# Patient Record
Sex: MALE | Race: WHITE | Employment: OTHER | URBAN - METROPOLITAN AREA
[De-identification: names, ages, dates, MRNs, and addresses within clinical notes are randomized per-mention and may not be internally consistent; named-entity substitution may affect disease eponyms.]

---

## 2017-01-01 ENCOUNTER — APPOINTMENT (OUTPATIENT)
Dept: GENERAL RADIOLOGY | Age: 82
DRG: 871 | End: 2017-01-01
Attending: HOSPITALIST
Payer: MEDICARE

## 2017-01-01 ENCOUNTER — APPOINTMENT (OUTPATIENT)
Dept: CT IMAGING | Age: 82
DRG: 871 | End: 2017-01-01
Attending: STUDENT IN AN ORGANIZED HEALTH CARE EDUCATION/TRAINING PROGRAM
Payer: MEDICARE

## 2017-01-01 ENCOUNTER — HOSPITAL ENCOUNTER (INPATIENT)
Age: 82
LOS: 5 days | Discharge: NURSING, REHAB OR LONG-TERM CARE | DRG: 871 | End: 2017-12-04
Attending: STUDENT IN AN ORGANIZED HEALTH CARE EDUCATION/TRAINING PROGRAM | Admitting: FAMILY MEDICINE
Payer: MEDICARE

## 2017-01-01 ENCOUNTER — APPOINTMENT (OUTPATIENT)
Dept: GENERAL RADIOLOGY | Age: 82
DRG: 871 | End: 2017-01-01
Attending: FAMILY MEDICINE
Payer: MEDICARE

## 2017-01-01 ENCOUNTER — APPOINTMENT (OUTPATIENT)
Dept: GENERAL RADIOLOGY | Age: 82
DRG: 871 | End: 2017-01-01
Attending: STUDENT IN AN ORGANIZED HEALTH CARE EDUCATION/TRAINING PROGRAM
Payer: MEDICARE

## 2017-01-01 ENCOUNTER — APPOINTMENT (OUTPATIENT)
Dept: ULTRASOUND IMAGING | Age: 82
DRG: 871 | End: 2017-01-01
Attending: FAMILY MEDICINE
Payer: MEDICARE

## 2017-01-01 ENCOUNTER — APPOINTMENT (OUTPATIENT)
Dept: MRI IMAGING | Age: 82
DRG: 871 | End: 2017-01-01
Attending: FAMILY MEDICINE
Payer: MEDICARE

## 2017-01-01 VITALS
SYSTOLIC BLOOD PRESSURE: 163 MMHG | OXYGEN SATURATION: 93 % | HEIGHT: 73 IN | HEART RATE: 80 BPM | TEMPERATURE: 97.8 F | WEIGHT: 182.6 LBS | BODY MASS INDEX: 24.2 KG/M2 | RESPIRATION RATE: 16 BRPM | DIASTOLIC BLOOD PRESSURE: 82 MMHG

## 2017-01-01 DIAGNOSIS — R29.6 FALLS FREQUENTLY: Primary | ICD-10-CM

## 2017-01-01 DIAGNOSIS — G91.2 NPH (NORMAL PRESSURE HYDROCEPHALUS) (HCC): ICD-10-CM

## 2017-01-01 DIAGNOSIS — J18.9 COMMUNITY ACQUIRED PNEUMONIA OF LEFT LOWER LOBE OF LUNG: ICD-10-CM

## 2017-01-01 LAB
ALBUMIN SERPL-MCNC: 3 G/DL (ref 3.5–5)
ALBUMIN/GLOB SERPL: 0.6 {RATIO} (ref 1.1–2.2)
ALP SERPL-CCNC: 98 U/L (ref 45–117)
ALT SERPL-CCNC: 17 U/L (ref 12–78)
ANION GAP SERPL CALC-SCNC: 10 MMOL/L (ref 5–15)
ANION GAP SERPL CALC-SCNC: 7 MMOL/L (ref 5–15)
ANION GAP SERPL CALC-SCNC: 7 MMOL/L (ref 5–15)
ANION GAP SERPL CALC-SCNC: 8 MMOL/L (ref 5–15)
ANION GAP SERPL CALC-SCNC: 9 MMOL/L (ref 5–15)
APPEARANCE UR: ABNORMAL
AST SERPL-CCNC: 22 U/L (ref 15–37)
ATRIAL RATE: 102 BPM
BACTERIA SPEC CULT: ABNORMAL
BACTERIA SPEC CULT: NORMAL
BACTERIA SPEC CULT: NORMAL
BACTERIA URNS QL MICRO: ABNORMAL /HPF
BASOPHILS # BLD: 0 K/UL (ref 0–0.1)
BASOPHILS NFR BLD: 0 % (ref 0–1)
BASOPHILS NFR BLD: 1 % (ref 0–1)
BASOPHILS NFR BLD: 1 % (ref 0–1)
BILIRUB SERPL-MCNC: 1.3 MG/DL (ref 0.2–1)
BILIRUB UR QL: NEGATIVE
BNP SERPL-MCNC: 4815 PG/ML (ref 0–450)
BUN SERPL-MCNC: 31 MG/DL (ref 6–20)
BUN SERPL-MCNC: 34 MG/DL (ref 6–20)
BUN SERPL-MCNC: 35 MG/DL (ref 6–20)
BUN SERPL-MCNC: 36 MG/DL (ref 6–20)
BUN SERPL-MCNC: 36 MG/DL (ref 6–20)
BUN/CREAT SERPL: 18 (ref 12–20)
BUN/CREAT SERPL: 20 (ref 12–20)
BUN/CREAT SERPL: 21 (ref 12–20)
BUN/CREAT SERPL: 23 (ref 12–20)
BUN/CREAT SERPL: 25 (ref 12–20)
CALCIUM SERPL-MCNC: 10.1 MG/DL (ref 8.5–10.1)
CALCIUM SERPL-MCNC: 8.9 MG/DL (ref 8.5–10.1)
CALCIUM SERPL-MCNC: 9 MG/DL (ref 8.5–10.1)
CALCIUM SERPL-MCNC: 9.3 MG/DL (ref 8.5–10.1)
CALCIUM SERPL-MCNC: 9.4 MG/DL (ref 8.5–10.1)
CALCULATED R AXIS, ECG10: -76 DEGREES
CALCULATED T AXIS, ECG11: 33 DEGREES
CC UR VC: ABNORMAL
CHLORIDE SERPL-SCNC: 106 MMOL/L (ref 97–108)
CHLORIDE SERPL-SCNC: 107 MMOL/L (ref 97–108)
CHLORIDE SERPL-SCNC: 108 MMOL/L (ref 97–108)
CHLORIDE SERPL-SCNC: 109 MMOL/L (ref 97–108)
CHLORIDE SERPL-SCNC: 110 MMOL/L (ref 97–108)
CK SERPL-CCNC: 333 U/L (ref 39–308)
CK SERPL-CCNC: 334 U/L (ref 39–308)
CO2 SERPL-SCNC: 21 MMOL/L (ref 21–32)
CO2 SERPL-SCNC: 23 MMOL/L (ref 21–32)
CO2 SERPL-SCNC: 24 MMOL/L (ref 21–32)
COLOR UR: ABNORMAL
CREAT SERPL-MCNC: 1.35 MG/DL (ref 0.7–1.3)
CREAT SERPL-MCNC: 1.36 MG/DL (ref 0.7–1.3)
CREAT SERPL-MCNC: 1.74 MG/DL (ref 0.7–1.3)
CREAT SERPL-MCNC: 1.8 MG/DL (ref 0.7–1.3)
CREAT SERPL-MCNC: 1.96 MG/DL (ref 0.7–1.3)
DIAGNOSIS, 93000: NORMAL
DIFFERENTIAL METHOD BLD: ABNORMAL
DIFFERENTIAL METHOD BLD: ABNORMAL
DIGOXIN SERPL-MCNC: 0.9 NG/ML (ref 0.9–2)
EOSINOPHIL # BLD: 0 K/UL (ref 0–0.4)
EOSINOPHIL # BLD: 0 K/UL (ref 0–0.4)
EOSINOPHIL # BLD: 0.1 K/UL (ref 0–0.4)
EOSINOPHIL # BLD: 0.3 K/UL (ref 0–0.4)
EOSINOPHIL # BLD: 0.4 K/UL (ref 0–0.4)
EOSINOPHIL NFR BLD: 0 % (ref 0–7)
EOSINOPHIL NFR BLD: 0 % (ref 0–7)
EOSINOPHIL NFR BLD: 1 % (ref 0–7)
EOSINOPHIL NFR BLD: 5 % (ref 0–7)
EOSINOPHIL NFR BLD: 8 % (ref 0–7)
EPITH CASTS URNS QL MICRO: ABNORMAL /LPF
ERYTHROCYTE [DISTWIDTH] IN BLOOD BY AUTOMATED COUNT: 14.6 % (ref 11.5–14.5)
ERYTHROCYTE [DISTWIDTH] IN BLOOD BY AUTOMATED COUNT: 14.8 % (ref 11.5–14.5)
ERYTHROCYTE [DISTWIDTH] IN BLOOD BY AUTOMATED COUNT: 15.3 % (ref 11.5–14.5)
ERYTHROCYTE [DISTWIDTH] IN BLOOD BY AUTOMATED COUNT: 15.3 % (ref 11.5–14.5)
ERYTHROCYTE [DISTWIDTH] IN BLOOD BY AUTOMATED COUNT: 15.5 % (ref 11.5–14.5)
EST. AVERAGE GLUCOSE BLD GHB EST-MCNC: 123 MG/DL
GLOBULIN SER CALC-MCNC: 5.2 G/DL (ref 2–4)
GLUCOSE BLD STRIP.AUTO-MCNC: 107 MG/DL (ref 65–100)
GLUCOSE BLD STRIP.AUTO-MCNC: 109 MG/DL (ref 65–100)
GLUCOSE BLD STRIP.AUTO-MCNC: 117 MG/DL (ref 65–100)
GLUCOSE BLD STRIP.AUTO-MCNC: 120 MG/DL (ref 65–100)
GLUCOSE BLD STRIP.AUTO-MCNC: 144 MG/DL (ref 65–100)
GLUCOSE BLD STRIP.AUTO-MCNC: 153 MG/DL (ref 65–100)
GLUCOSE BLD STRIP.AUTO-MCNC: 164 MG/DL (ref 65–100)
GLUCOSE BLD STRIP.AUTO-MCNC: 165 MG/DL (ref 65–100)
GLUCOSE BLD STRIP.AUTO-MCNC: 176 MG/DL (ref 65–100)
GLUCOSE BLD STRIP.AUTO-MCNC: 179 MG/DL (ref 65–100)
GLUCOSE BLD STRIP.AUTO-MCNC: 198 MG/DL (ref 65–100)
GLUCOSE BLD STRIP.AUTO-MCNC: 76 MG/DL (ref 65–100)
GLUCOSE BLD STRIP.AUTO-MCNC: 76 MG/DL (ref 65–100)
GLUCOSE BLD STRIP.AUTO-MCNC: 86 MG/DL (ref 65–100)
GLUCOSE BLD STRIP.AUTO-MCNC: 86 MG/DL (ref 65–100)
GLUCOSE BLD STRIP.AUTO-MCNC: 87 MG/DL (ref 65–100)
GLUCOSE BLD STRIP.AUTO-MCNC: 87 MG/DL (ref 65–100)
GLUCOSE BLD STRIP.AUTO-MCNC: 91 MG/DL (ref 65–100)
GLUCOSE BLD STRIP.AUTO-MCNC: 94 MG/DL (ref 65–100)
GLUCOSE BLD STRIP.AUTO-MCNC: 96 MG/DL (ref 65–100)
GLUCOSE BLD STRIP.AUTO-MCNC: 96 MG/DL (ref 65–100)
GLUCOSE BLD STRIP.AUTO-MCNC: 99 MG/DL (ref 65–100)
GLUCOSE SERPL-MCNC: 125 MG/DL (ref 65–100)
GLUCOSE SERPL-MCNC: 202 MG/DL (ref 65–100)
GLUCOSE SERPL-MCNC: 72 MG/DL (ref 65–100)
GLUCOSE SERPL-MCNC: 74 MG/DL (ref 65–100)
GLUCOSE SERPL-MCNC: 95 MG/DL (ref 65–100)
GLUCOSE UR STRIP.AUTO-MCNC: NEGATIVE MG/DL
HBA1C MFR BLD: 5.9 % (ref 4.2–6.3)
HCT VFR BLD AUTO: 34.3 % (ref 36.6–50.3)
HCT VFR BLD AUTO: 34.8 % (ref 36.6–50.3)
HCT VFR BLD AUTO: 36.3 % (ref 36.6–50.3)
HCT VFR BLD AUTO: 41.9 % (ref 36.6–50.3)
HCT VFR BLD AUTO: 42.8 % (ref 36.6–50.3)
HGB BLD-MCNC: 11.4 G/DL (ref 12.1–17)
HGB BLD-MCNC: 11.6 G/DL (ref 12.1–17)
HGB BLD-MCNC: 12 G/DL (ref 12.1–17)
HGB BLD-MCNC: 13.5 G/DL (ref 12.1–17)
HGB BLD-MCNC: 14 G/DL (ref 12.1–17)
HGB UR QL STRIP: ABNORMAL
KETONES UR QL STRIP.AUTO: NEGATIVE MG/DL
LACTATE SERPL-SCNC: 1.1 MMOL/L (ref 0.4–2)
LACTATE SERPL-SCNC: 1.2 MMOL/L (ref 0.4–2)
LACTATE SERPL-SCNC: 2.3 MMOL/L (ref 0.4–2)
LEUKOCYTE ESTERASE UR QL STRIP.AUTO: ABNORMAL
LYMPHOCYTES # BLD: 0.8 K/UL (ref 0.8–3.5)
LYMPHOCYTES # BLD: 0.8 K/UL (ref 0.8–3.5)
LYMPHOCYTES # BLD: 0.9 K/UL (ref 0.8–3.5)
LYMPHOCYTES # BLD: 1 K/UL (ref 0.8–3.5)
LYMPHOCYTES # BLD: 1.1 K/UL (ref 0.8–3.5)
LYMPHOCYTES NFR BLD: 16 % (ref 12–49)
LYMPHOCYTES NFR BLD: 18 % (ref 12–49)
LYMPHOCYTES NFR BLD: 18 % (ref 12–49)
LYMPHOCYTES NFR BLD: 8 % (ref 12–49)
LYMPHOCYTES NFR BLD: 8 % (ref 12–49)
MCH RBC QN AUTO: 30.2 PG (ref 26–34)
MCH RBC QN AUTO: 30.3 PG (ref 26–34)
MCH RBC QN AUTO: 30.8 PG (ref 26–34)
MCH RBC QN AUTO: 30.8 PG (ref 26–34)
MCH RBC QN AUTO: 30.9 PG (ref 26–34)
MCHC RBC AUTO-ENTMCNC: 32.2 G/DL (ref 30–36.5)
MCHC RBC AUTO-ENTMCNC: 32.7 G/DL (ref 30–36.5)
MCHC RBC AUTO-ENTMCNC: 33.1 G/DL (ref 30–36.5)
MCHC RBC AUTO-ENTMCNC: 33.2 G/DL (ref 30–36.5)
MCHC RBC AUTO-ENTMCNC: 33.3 G/DL (ref 30–36.5)
MCV RBC AUTO: 90.6 FL (ref 80–99)
MCV RBC AUTO: 91.2 FL (ref 80–99)
MCV RBC AUTO: 93.6 FL (ref 80–99)
MCV RBC AUTO: 94.3 FL (ref 80–99)
MCV RBC AUTO: 95.7 FL (ref 80–99)
METAMYELOCYTES NFR BLD MANUAL: 1 %
MONOCYTES # BLD: 0.5 K/UL (ref 0–1)
MONOCYTES # BLD: 0.5 K/UL (ref 0–1)
MONOCYTES # BLD: 0.6 K/UL (ref 0–1)
MONOCYTES # BLD: 0.7 K/UL (ref 0–1)
MONOCYTES # BLD: 1.2 K/UL (ref 0–1)
MONOCYTES NFR BLD: 11 % (ref 5–13)
MONOCYTES NFR BLD: 12 % (ref 5–13)
MONOCYTES NFR BLD: 12 % (ref 5–13)
MONOCYTES NFR BLD: 5 % (ref 5–13)
MONOCYTES NFR BLD: 8 % (ref 5–13)
NEUTS BAND NFR BLD MANUAL: 6 % (ref 0–6)
NEUTS SEG # BLD: 12.2 K/UL (ref 1.8–8)
NEUTS SEG # BLD: 2.6 K/UL (ref 1.8–8)
NEUTS SEG # BLD: 3.5 K/UL (ref 1.8–8)
NEUTS SEG # BLD: 4.1 K/UL (ref 1.8–8)
NEUTS SEG # BLD: 8.5 K/UL (ref 1.8–8)
NEUTS SEG NFR BLD: 60 % (ref 32–75)
NEUTS SEG NFR BLD: 64 % (ref 32–75)
NEUTS SEG NFR BLD: 72 % (ref 32–75)
NEUTS SEG NFR BLD: 81 % (ref 32–75)
NEUTS SEG NFR BLD: 84 % (ref 32–75)
NITRITE UR QL STRIP.AUTO: POSITIVE
PH UR STRIP: 5.5 [PH] (ref 5–8)
PLATELET # BLD AUTO: 116 K/UL (ref 150–400)
PLATELET # BLD AUTO: 133 K/UL (ref 150–400)
PLATELET # BLD AUTO: 136 K/UL (ref 150–400)
PLATELET # BLD AUTO: 155 K/UL (ref 150–400)
PLATELET # BLD AUTO: 169 K/UL (ref 150–400)
PLATELET COMMENTS,PCOM: ABNORMAL
POTASSIUM SERPL-SCNC: 3.6 MMOL/L (ref 3.5–5.1)
POTASSIUM SERPL-SCNC: 3.8 MMOL/L (ref 3.5–5.1)
POTASSIUM SERPL-SCNC: 4 MMOL/L (ref 3.5–5.1)
POTASSIUM SERPL-SCNC: 4.2 MMOL/L (ref 3.5–5.1)
POTASSIUM SERPL-SCNC: 4.5 MMOL/L (ref 3.5–5.1)
PROT SERPL-MCNC: 8.2 G/DL (ref 6.4–8.2)
PROT UR STRIP-MCNC: 100 MG/DL
Q-T INTERVAL, ECG07: 356 MS
QRS DURATION, ECG06: 132 MS
QTC CALCULATION (BEZET), ECG08: 459 MS
RBC # BLD AUTO: 3.76 M/UL (ref 4.1–5.7)
RBC # BLD AUTO: 3.84 M/UL (ref 4.1–5.7)
RBC # BLD AUTO: 3.88 M/UL (ref 4.1–5.7)
RBC # BLD AUTO: 4.38 M/UL (ref 4.1–5.7)
RBC # BLD AUTO: 4.54 M/UL (ref 4.1–5.7)
RBC #/AREA URNS HPF: ABNORMAL /HPF (ref 0–5)
RBC MORPH BLD: ABNORMAL
SERVICE CMNT-IMP: ABNORMAL
SERVICE CMNT-IMP: NORMAL
SODIUM SERPL-SCNC: 138 MMOL/L (ref 136–145)
SODIUM SERPL-SCNC: 138 MMOL/L (ref 136–145)
SODIUM SERPL-SCNC: 139 MMOL/L (ref 136–145)
SODIUM SERPL-SCNC: 140 MMOL/L (ref 136–145)
SODIUM SERPL-SCNC: 142 MMOL/L (ref 136–145)
SP GR UR REFRACTOMETRY: 1.02 (ref 1–1.03)
TROPONIN I SERPL-MCNC: <0.04 NG/ML
TSH SERPL DL<=0.05 MIU/L-ACNC: 1.02 UIU/ML (ref 0.36–3.74)
UA: UC IF INDICATED,UAUC: ABNORMAL
UROBILINOGEN UR QL STRIP.AUTO: 1 EU/DL (ref 0.2–1)
VENTRICULAR RATE, ECG03: 100 BPM
WBC # BLD AUTO: 14.6 K/UL (ref 4.1–11.1)
WBC # BLD AUTO: 4.4 K/UL (ref 4.1–11.1)
WBC # BLD AUTO: 5.5 K/UL (ref 4.1–11.1)
WBC # BLD AUTO: 5.7 K/UL (ref 4.1–11.1)
WBC # BLD AUTO: 9.8 K/UL (ref 4.1–11.1)
WBC MORPH BLD: ABNORMAL
WBC MORPH BLD: ABNORMAL
WBC URNS QL MICRO: >100 /HPF (ref 0–4)

## 2017-01-01 PROCEDURE — 87040 BLOOD CULTURE FOR BACTERIA: CPT | Performed by: FAMILY MEDICINE

## 2017-01-01 PROCEDURE — G8979 MOBILITY GOAL STATUS: HCPCS

## 2017-01-01 PROCEDURE — 74011250637 HC RX REV CODE- 250/637: Performed by: FAMILY MEDICINE

## 2017-01-01 PROCEDURE — 36415 COLL VENOUS BLD VENIPUNCTURE: CPT | Performed by: HOSPITALIST

## 2017-01-01 PROCEDURE — 99285 EMERGENCY DEPT VISIT HI MDM: CPT

## 2017-01-01 PROCEDURE — 74011250637 HC RX REV CODE- 250/637: Performed by: HOSPITALIST

## 2017-01-01 PROCEDURE — 70551 MRI BRAIN STEM W/O DYE: CPT

## 2017-01-01 PROCEDURE — 85025 COMPLETE CBC W/AUTO DIFF WBC: CPT | Performed by: HOSPITALIST

## 2017-01-01 PROCEDURE — 74011000250 HC RX REV CODE- 250: Performed by: FAMILY MEDICINE

## 2017-01-01 PROCEDURE — 82962 GLUCOSE BLOOD TEST: CPT

## 2017-01-01 PROCEDURE — 85025 COMPLETE CBC W/AUTO DIFF WBC: CPT | Performed by: FAMILY MEDICINE

## 2017-01-01 PROCEDURE — 94640 AIRWAY INHALATION TREATMENT: CPT

## 2017-01-01 PROCEDURE — 83605 ASSAY OF LACTIC ACID: CPT | Performed by: FAMILY MEDICINE

## 2017-01-01 PROCEDURE — 71010 XR CHEST PORT: CPT

## 2017-01-01 PROCEDURE — 82550 ASSAY OF CK (CPK): CPT | Performed by: FAMILY MEDICINE

## 2017-01-01 PROCEDURE — 77010033678 HC OXYGEN DAILY

## 2017-01-01 PROCEDURE — 80048 BASIC METABOLIC PNL TOTAL CA: CPT | Performed by: FAMILY MEDICINE

## 2017-01-01 PROCEDURE — 65660000000 HC RM CCU STEPDOWN

## 2017-01-01 PROCEDURE — 74011250636 HC RX REV CODE- 250/636: Performed by: HOSPITALIST

## 2017-01-01 PROCEDURE — 97116 GAIT TRAINING THERAPY: CPT

## 2017-01-01 PROCEDURE — 97530 THERAPEUTIC ACTIVITIES: CPT

## 2017-01-01 PROCEDURE — 71020 XR CHEST PA LAT: CPT

## 2017-01-01 PROCEDURE — 84443 ASSAY THYROID STIM HORMONE: CPT | Performed by: HOSPITALIST

## 2017-01-01 PROCEDURE — 97161 PT EVAL LOW COMPLEX 20 MIN: CPT

## 2017-01-01 PROCEDURE — 74011250637 HC RX REV CODE- 250/637: Performed by: PSYCHIATRY & NEUROLOGY

## 2017-01-01 PROCEDURE — 80048 BASIC METABOLIC PNL TOTAL CA: CPT | Performed by: HOSPITALIST

## 2017-01-01 PROCEDURE — 74011636637 HC RX REV CODE- 636/637: Performed by: FAMILY MEDICINE

## 2017-01-01 PROCEDURE — 74011250636 HC RX REV CODE- 250/636: Performed by: FAMILY MEDICINE

## 2017-01-01 PROCEDURE — 77030011943

## 2017-01-01 PROCEDURE — 74011000258 HC RX REV CODE- 258: Performed by: HOSPITALIST

## 2017-01-01 PROCEDURE — 73030 X-RAY EXAM OF SHOULDER: CPT

## 2017-01-01 PROCEDURE — 80162 ASSAY OF DIGOXIN TOTAL: CPT | Performed by: FAMILY MEDICINE

## 2017-01-01 PROCEDURE — 87040 BLOOD CULTURE FOR BACTERIA: CPT | Performed by: HOSPITALIST

## 2017-01-01 PROCEDURE — 36415 COLL VENOUS BLD VENIPUNCTURE: CPT | Performed by: FAMILY MEDICINE

## 2017-01-01 PROCEDURE — 83880 ASSAY OF NATRIURETIC PEPTIDE: CPT | Performed by: HOSPITALIST

## 2017-01-01 PROCEDURE — 74011250636 HC RX REV CODE- 250/636: Performed by: STUDENT IN AN ORGANIZED HEALTH CARE EDUCATION/TRAINING PROGRAM

## 2017-01-01 PROCEDURE — 85025 COMPLETE CBC W/AUTO DIFF WBC: CPT | Performed by: STUDENT IN AN ORGANIZED HEALTH CARE EDUCATION/TRAINING PROGRAM

## 2017-01-01 PROCEDURE — 93005 ELECTROCARDIOGRAM TRACING: CPT

## 2017-01-01 PROCEDURE — 80053 COMPREHEN METABOLIC PANEL: CPT | Performed by: STUDENT IN AN ORGANIZED HEALTH CARE EDUCATION/TRAINING PROGRAM

## 2017-01-01 PROCEDURE — 83036 HEMOGLOBIN GLYCOSYLATED A1C: CPT | Performed by: FAMILY MEDICINE

## 2017-01-01 PROCEDURE — 84484 ASSAY OF TROPONIN QUANT: CPT | Performed by: FAMILY MEDICINE

## 2017-01-01 PROCEDURE — G8978 MOBILITY CURRENT STATUS: HCPCS

## 2017-01-01 PROCEDURE — 77030032490 HC SLV COMPR SCD KNE COVD -B

## 2017-01-01 PROCEDURE — 93306 TTE W/DOPPLER COMPLETE: CPT

## 2017-01-01 PROCEDURE — 81001 URINALYSIS AUTO W/SCOPE: CPT | Performed by: FAMILY MEDICINE

## 2017-01-01 PROCEDURE — 76770 US EXAM ABDO BACK WALL COMP: CPT

## 2017-01-01 PROCEDURE — 87086 URINE CULTURE/COLONY COUNT: CPT | Performed by: FAMILY MEDICINE

## 2017-01-01 PROCEDURE — 70450 CT HEAD/BRAIN W/O DYE: CPT

## 2017-01-01 PROCEDURE — 83605 ASSAY OF LACTIC ACID: CPT | Performed by: HOSPITALIST

## 2017-01-01 RX ORDER — DILTIAZEM HYDROCHLORIDE 30 MG/1
30 TABLET, FILM COATED ORAL EVERY 6 HOURS
Status: DISCONTINUED | OUTPATIENT
Start: 2017-01-01 | End: 2017-01-01 | Stop reason: HOSPADM

## 2017-01-01 RX ORDER — DIGOXIN 125 MCG
0.12 TABLET ORAL
Status: DISCONTINUED | OUTPATIENT
Start: 2017-01-01 | End: 2017-01-01 | Stop reason: HOSPADM

## 2017-01-01 RX ORDER — DEXTROSE 50 % IN WATER (D50W) INTRAVENOUS SYRINGE
12.5-25 AS NEEDED
Status: DISCONTINUED | OUTPATIENT
Start: 2017-01-01 | End: 2017-01-01 | Stop reason: HOSPADM

## 2017-01-01 RX ORDER — DIGOXIN 125 MCG
0.12 TABLET ORAL DAILY
Status: DISCONTINUED | OUTPATIENT
Start: 2017-01-01 | End: 2017-01-01

## 2017-01-01 RX ORDER — LEVOTHYROXINE SODIUM 50 UG/1
100 TABLET ORAL
Status: DISCONTINUED | OUTPATIENT
Start: 2017-01-01 | End: 2017-01-01 | Stop reason: HOSPADM

## 2017-01-01 RX ORDER — SIMETHICONE 80 MG
80 TABLET,CHEWABLE ORAL 3 TIMES DAILY
Status: DISCONTINUED | OUTPATIENT
Start: 2017-01-01 | End: 2017-01-01 | Stop reason: HOSPADM

## 2017-01-01 RX ORDER — SODIUM CHLORIDE 0.9 % (FLUSH) 0.9 %
5-10 SYRINGE (ML) INJECTION EVERY 8 HOURS
Status: DISCONTINUED | OUTPATIENT
Start: 2017-01-01 | End: 2017-01-01 | Stop reason: HOSPADM

## 2017-01-01 RX ORDER — LEVOTHYROXINE SODIUM 100 UG/1
100 TABLET ORAL
COMMUNITY

## 2017-01-01 RX ORDER — DULOXETIN HYDROCHLORIDE 30 MG/1
30 CAPSULE, DELAYED RELEASE ORAL
COMMUNITY

## 2017-01-01 RX ORDER — LEVOFLOXACIN 750 MG/1
750 TABLET ORAL
Qty: 3 TAB | Refills: 0 | Status: SHIPPED
Start: 2017-01-01 | End: 2017-01-01

## 2017-01-01 RX ORDER — AMLODIPINE BESYLATE 5 MG/1
5 TABLET ORAL DAILY
Status: DISCONTINUED | OUTPATIENT
Start: 2017-01-01 | End: 2017-01-01

## 2017-01-01 RX ORDER — DULOXETIN HYDROCHLORIDE 30 MG/1
30 CAPSULE, DELAYED RELEASE ORAL
Status: DISCONTINUED | OUTPATIENT
Start: 2017-01-01 | End: 2017-01-01 | Stop reason: HOSPADM

## 2017-01-01 RX ORDER — TRAZODONE HYDROCHLORIDE 100 MG/1
200 TABLET ORAL
COMMUNITY

## 2017-01-01 RX ORDER — CHOLECALCIFEROL TAB 125 MCG (5000 UNIT) 125 MCG
5000 TAB ORAL
COMMUNITY

## 2017-01-01 RX ORDER — CEFEPIME HYDROCHLORIDE 1 G/1
1 INJECTION, POWDER, FOR SOLUTION INTRAMUSCULAR; INTRAVENOUS EVERY 12 HOURS
Status: DISCONTINUED | OUTPATIENT
Start: 2017-01-01 | End: 2017-01-01 | Stop reason: DRUGHIGH

## 2017-01-01 RX ORDER — TAMSULOSIN HYDROCHLORIDE 0.4 MG/1
0.4 CAPSULE ORAL
Status: DISCONTINUED | OUTPATIENT
Start: 2017-01-01 | End: 2017-01-01

## 2017-01-01 RX ORDER — RANITIDINE 300 MG/1
300 TABLET ORAL
COMMUNITY

## 2017-01-01 RX ORDER — FUROSEMIDE 10 MG/ML
40 INJECTION INTRAMUSCULAR; INTRAVENOUS ONCE
Status: COMPLETED | OUTPATIENT
Start: 2017-01-01 | End: 2017-01-01

## 2017-01-01 RX ORDER — LEVOFLOXACIN 5 MG/ML
750 INJECTION, SOLUTION INTRAVENOUS
Status: DISCONTINUED | OUTPATIENT
Start: 2017-01-01 | End: 2017-01-01 | Stop reason: HOSPADM

## 2017-01-01 RX ORDER — ACETAMINOPHEN 325 MG/1
650 TABLET ORAL
Status: DISCONTINUED | OUTPATIENT
Start: 2017-01-01 | End: 2017-01-01

## 2017-01-01 RX ORDER — AMLODIPINE BESYLATE 5 MG/1
5 TABLET ORAL ONCE
Status: COMPLETED | OUTPATIENT
Start: 2017-01-01 | End: 2017-01-01

## 2017-01-01 RX ORDER — TAMSULOSIN HYDROCHLORIDE 0.4 MG/1
0.4 CAPSULE ORAL 2 TIMES DAILY
Status: DISCONTINUED | OUTPATIENT
Start: 2017-01-01 | End: 2017-01-01 | Stop reason: HOSPADM

## 2017-01-01 RX ORDER — HEPARIN SODIUM 5000 [USP'U]/ML
5000 INJECTION, SOLUTION INTRAVENOUS; SUBCUTANEOUS EVERY 12 HOURS
Status: DISCONTINUED | OUTPATIENT
Start: 2017-01-01 | End: 2017-01-01 | Stop reason: HOSPADM

## 2017-01-01 RX ORDER — CLINDAMYCIN HYDROCHLORIDE 300 MG/1
600 CAPSULE ORAL
COMMUNITY

## 2017-01-01 RX ORDER — SIMVASTATIN 20 MG/1
20 TABLET, FILM COATED ORAL
Status: DISCONTINUED | OUTPATIENT
Start: 2017-01-01 | End: 2017-01-01 | Stop reason: HOSPADM

## 2017-01-01 RX ORDER — ALBUTEROL SULFATE 0.83 MG/ML
2.5 SOLUTION RESPIRATORY (INHALATION)
Status: DISCONTINUED | OUTPATIENT
Start: 2017-01-01 | End: 2017-01-01 | Stop reason: HOSPADM

## 2017-01-01 RX ORDER — PRAMIPEXOLE DIHYDROCHLORIDE 0.25 MG/1
0.12 TABLET ORAL
Status: DISCONTINUED | OUTPATIENT
Start: 2017-01-01 | End: 2017-01-01 | Stop reason: HOSPADM

## 2017-01-01 RX ORDER — ACETAMINOPHEN 325 MG/1
650 TABLET ORAL
Status: DISCONTINUED | OUTPATIENT
Start: 2017-01-01 | End: 2017-01-01 | Stop reason: HOSPADM

## 2017-01-01 RX ORDER — ASPIRIN 81 MG/1
81 TABLET ORAL DAILY
Status: SHIPPED | COMMUNITY
Start: 2017-01-01

## 2017-01-01 RX ORDER — UREA 10 %
2 LOTION (ML) TOPICAL EVERY 12 HOURS
Status: SHIPPED | COMMUNITY

## 2017-01-01 RX ORDER — DILTIAZEM HYDROCHLORIDE 120 MG/1
120 CAPSULE, COATED, EXTENDED RELEASE ORAL DAILY
Qty: 30 CAP | Status: SHIPPED | COMMUNITY
Start: 2017-01-01

## 2017-01-01 RX ORDER — FAMOTIDINE 20 MG/1
20 TABLET, FILM COATED ORAL
Status: DISCONTINUED | OUTPATIENT
Start: 2017-01-01 | End: 2017-01-01 | Stop reason: HOSPADM

## 2017-01-01 RX ORDER — DIGOXIN 125 MCG
0.12 TABLET ORAL ONCE
Status: COMPLETED | OUTPATIENT
Start: 2017-01-01 | End: 2017-01-01

## 2017-01-01 RX ORDER — LOSARTAN POTASSIUM 100 MG/1
100 TABLET ORAL DAILY
COMMUNITY
End: 2017-01-01

## 2017-01-01 RX ORDER — GLIPIZIDE 2.5 MG/1
2.5 TABLET, EXTENDED RELEASE ORAL DAILY
COMMUNITY
End: 2018-01-01

## 2017-01-01 RX ORDER — SODIUM CHLORIDE 9 MG/ML
75 INJECTION, SOLUTION INTRAVENOUS CONTINUOUS
Status: DISCONTINUED | OUTPATIENT
Start: 2017-01-01 | End: 2017-01-01 | Stop reason: HOSPADM

## 2017-01-01 RX ORDER — ALBUTEROL SULFATE 0.83 MG/ML
2.5 SOLUTION RESPIRATORY (INHALATION)
Status: DISCONTINUED | OUTPATIENT
Start: 2017-01-01 | End: 2017-01-01

## 2017-01-01 RX ORDER — PREGABALIN 75 MG/1
75 CAPSULE ORAL
Status: DISCONTINUED | OUTPATIENT
Start: 2017-01-01 | End: 2017-01-01 | Stop reason: HOSPADM

## 2017-01-01 RX ORDER — LEVOFLOXACIN 5 MG/ML
750 INJECTION, SOLUTION INTRAVENOUS
Status: COMPLETED | OUTPATIENT
Start: 2017-01-01 | End: 2017-01-01

## 2017-01-01 RX ORDER — MAGNESIUM SULFATE 100 %
4 CRYSTALS MISCELLANEOUS AS NEEDED
Status: DISCONTINUED | OUTPATIENT
Start: 2017-01-01 | End: 2017-01-01 | Stop reason: HOSPADM

## 2017-01-01 RX ORDER — ASPIRIN 81 MG/1
81 TABLET ORAL DAILY
Status: DISCONTINUED | OUTPATIENT
Start: 2017-01-01 | End: 2017-01-01 | Stop reason: HOSPADM

## 2017-01-01 RX ORDER — INSULIN LISPRO 100 [IU]/ML
INJECTION, SOLUTION INTRAVENOUS; SUBCUTANEOUS EVERY 6 HOURS
Status: DISCONTINUED | OUTPATIENT
Start: 2017-01-01 | End: 2017-01-01

## 2017-01-01 RX ORDER — GLIPIZIDE 2.5 MG/1
2.5 TABLET, EXTENDED RELEASE ORAL DAILY
Status: DISCONTINUED | OUTPATIENT
Start: 2017-01-01 | End: 2017-01-01 | Stop reason: HOSPADM

## 2017-01-01 RX ORDER — FINASTERIDE 5 MG/1
5 TABLET, FILM COATED ORAL DAILY
Status: DISCONTINUED | OUTPATIENT
Start: 2017-01-01 | End: 2017-01-01 | Stop reason: HOSPADM

## 2017-01-01 RX ORDER — DIGOXIN 125 MCG
0.12 TABLET ORAL DAILY
COMMUNITY

## 2017-01-01 RX ORDER — SIMETHICONE 80 MG
80 TABLET,CHEWABLE ORAL 3 TIMES DAILY
COMMUNITY

## 2017-01-01 RX ORDER — INSULIN LISPRO 100 [IU]/ML
INJECTION, SOLUTION INTRAVENOUS; SUBCUTANEOUS
Status: DISCONTINUED | OUTPATIENT
Start: 2017-01-01 | End: 2017-01-01 | Stop reason: HOSPADM

## 2017-01-01 RX ORDER — PANTOPRAZOLE SODIUM 40 MG/1
40 TABLET, DELAYED RELEASE ORAL
Status: DISCONTINUED | OUTPATIENT
Start: 2017-01-01 | End: 2017-01-01 | Stop reason: HOSPADM

## 2017-01-01 RX ORDER — DIGOXIN 125 MCG
0.12 TABLET ORAL DAILY
Status: DISCONTINUED | OUTPATIENT
Start: 2017-01-01 | End: 2017-01-01 | Stop reason: HOSPADM

## 2017-01-01 RX ORDER — SODIUM CHLORIDE 0.9 % (FLUSH) 0.9 %
5-10 SYRINGE (ML) INJECTION AS NEEDED
Status: DISCONTINUED | OUTPATIENT
Start: 2017-01-01 | End: 2017-01-01 | Stop reason: HOSPADM

## 2017-01-01 RX ADMIN — LEVOTHYROXINE SODIUM 100 MCG: 100 TABLET ORAL at 07:32

## 2017-01-01 RX ADMIN — PANTOPRAZOLE SODIUM 40 MG: 40 TABLET, DELAYED RELEASE ORAL at 05:55

## 2017-01-01 RX ADMIN — ALBUTEROL SULFATE 2.5 MG: 2.5 SOLUTION RESPIRATORY (INHALATION) at 08:06

## 2017-01-01 RX ADMIN — ALBUTEROL SULFATE 2.5 MG: 2.5 SOLUTION RESPIRATORY (INHALATION) at 16:54

## 2017-01-01 RX ADMIN — ALBUTEROL SULFATE 2.5 MG: 2.5 SOLUTION RESPIRATORY (INHALATION) at 07:44

## 2017-01-01 RX ADMIN — SIMVASTATIN 20 MG: 20 TABLET, FILM COATED ORAL at 23:40

## 2017-01-01 RX ADMIN — PREGABALIN 75 MG: 75 CAPSULE ORAL at 21:01

## 2017-01-01 RX ADMIN — DIGOXIN 0.12 MG: 125 TABLET ORAL at 17:10

## 2017-01-01 RX ADMIN — SIMVASTATIN 20 MG: 20 TABLET, FILM COATED ORAL at 21:42

## 2017-01-01 RX ADMIN — SITAGLIPTIN 25 MG: 25 TABLET, FILM COATED ORAL at 09:43

## 2017-01-01 RX ADMIN — DILTIAZEM HYDROCHLORIDE 30 MG: 30 TABLET, FILM COATED ORAL at 17:32

## 2017-01-01 RX ADMIN — HEPARIN SODIUM 5000 UNITS: 5000 INJECTION, SOLUTION INTRAVENOUS; SUBCUTANEOUS at 08:14

## 2017-01-01 RX ADMIN — HEPARIN SODIUM 5000 UNITS: 5000 INJECTION, SOLUTION INTRAVENOUS; SUBCUTANEOUS at 21:18

## 2017-01-01 RX ADMIN — ASPIRIN 81 MG: 81 TABLET, COATED ORAL at 08:25

## 2017-01-01 RX ADMIN — LEVOTHYROXINE SODIUM 100 MCG: 100 TABLET ORAL at 07:29

## 2017-01-01 RX ADMIN — GLIPIZIDE 2.5 MG: 2.5 TABLET, FILM COATED, EXTENDED RELEASE ORAL at 08:32

## 2017-01-01 RX ADMIN — SIMETHICONE CHEW TAB 80 MG 80 MG: 80 TABLET ORAL at 08:25

## 2017-01-01 RX ADMIN — SODIUM CHLORIDE 75 ML/HR: 900 INJECTION, SOLUTION INTRAVENOUS at 00:15

## 2017-01-01 RX ADMIN — SIMETHICONE CHEW TAB 80 MG 80 MG: 80 TABLET ORAL at 21:42

## 2017-01-01 RX ADMIN — SIMETHICONE CHEW TAB 80 MG 80 MG: 80 TABLET ORAL at 18:11

## 2017-01-01 RX ADMIN — SIMETHICONE CHEW TAB 80 MG 80 MG: 80 TABLET ORAL at 23:36

## 2017-01-01 RX ADMIN — DULOXETINE 30 MG: 30 CAPSULE, DELAYED RELEASE ORAL at 21:23

## 2017-01-01 RX ADMIN — AMLODIPINE BESYLATE 5 MG: 5 TABLET ORAL at 20:41

## 2017-01-01 RX ADMIN — SIMETHICONE CHEW TAB 80 MG 80 MG: 80 TABLET ORAL at 09:43

## 2017-01-01 RX ADMIN — TAMSULOSIN HYDROCHLORIDE 0.4 MG: 0.4 CAPSULE ORAL at 09:43

## 2017-01-01 RX ADMIN — PREGABALIN 75 MG: 75 CAPSULE ORAL at 21:22

## 2017-01-01 RX ADMIN — SITAGLIPTIN 25 MG: 25 TABLET, FILM COATED ORAL at 08:14

## 2017-01-01 RX ADMIN — FUROSEMIDE 40 MG: 10 INJECTION, SOLUTION INTRAMUSCULAR; INTRAVENOUS at 14:50

## 2017-01-01 RX ADMIN — SODIUM CHLORIDE 75 ML/HR: 900 INJECTION, SOLUTION INTRAVENOUS at 19:28

## 2017-01-01 RX ADMIN — FAMOTIDINE 20 MG: 20 TABLET, FILM COATED ORAL at 21:22

## 2017-01-01 RX ADMIN — TAMSULOSIN HYDROCHLORIDE 0.4 MG: 0.4 CAPSULE ORAL at 08:25

## 2017-01-01 RX ADMIN — TAMSULOSIN HYDROCHLORIDE 0.4 MG: 0.4 CAPSULE ORAL at 09:39

## 2017-01-01 RX ADMIN — PRAMIPEXOLE DIHYDROCHLORIDE 0.12 MG: 0.25 TABLET ORAL at 21:01

## 2017-01-01 RX ADMIN — INSULIN LISPRO 2 UNITS: 100 INJECTION, SOLUTION INTRAVENOUS; SUBCUTANEOUS at 12:43

## 2017-01-01 RX ADMIN — FINASTERIDE 5 MG: 5 TABLET, FILM COATED ORAL at 08:25

## 2017-01-01 RX ADMIN — DULOXETINE 30 MG: 30 CAPSULE, DELAYED RELEASE ORAL at 21:02

## 2017-01-01 RX ADMIN — TAMSULOSIN HYDROCHLORIDE 0.4 MG: 0.4 CAPSULE ORAL at 22:00

## 2017-01-01 RX ADMIN — Medication 10 ML: at 23:40

## 2017-01-01 RX ADMIN — DILTIAZEM HYDROCHLORIDE 30 MG: 30 TABLET, FILM COATED ORAL at 13:07

## 2017-01-01 RX ADMIN — HEPARIN SODIUM 5000 UNITS: 5000 INJECTION, SOLUTION INTRAVENOUS; SUBCUTANEOUS at 19:17

## 2017-01-01 RX ADMIN — Medication 10 ML: at 07:32

## 2017-01-01 RX ADMIN — PREGABALIN 75 MG: 75 CAPSULE ORAL at 14:00

## 2017-01-01 RX ADMIN — Medication 10 ML: at 21:26

## 2017-01-01 RX ADMIN — SODIUM CHLORIDE 75 ML/HR: 900 INJECTION, SOLUTION INTRAVENOUS at 16:00

## 2017-01-01 RX ADMIN — Medication 10 ML: at 08:15

## 2017-01-01 RX ADMIN — ACETAMINOPHEN 650 MG: 325 TABLET, FILM COATED ORAL at 21:27

## 2017-01-01 RX ADMIN — ALBUTEROL SULFATE 2.5 MG: 2.5 SOLUTION RESPIRATORY (INHALATION) at 20:13

## 2017-01-01 RX ADMIN — SIMETHICONE CHEW TAB 80 MG 80 MG: 80 TABLET ORAL at 22:00

## 2017-01-01 RX ADMIN — FAMOTIDINE 20 MG: 20 TABLET, FILM COATED ORAL at 21:01

## 2017-01-01 RX ADMIN — SIMVASTATIN 20 MG: 20 TABLET, FILM COATED ORAL at 21:22

## 2017-01-01 RX ADMIN — TAMSULOSIN HYDROCHLORIDE 0.4 MG: 0.4 CAPSULE ORAL at 18:11

## 2017-01-01 RX ADMIN — SIMETHICONE CHEW TAB 80 MG 80 MG: 80 TABLET ORAL at 17:45

## 2017-01-01 RX ADMIN — ASPIRIN 81 MG: 81 TABLET, COATED ORAL at 08:14

## 2017-01-01 RX ADMIN — ALBUTEROL SULFATE 2.5 MG: 2.5 SOLUTION RESPIRATORY (INHALATION) at 03:57

## 2017-01-01 RX ADMIN — ALBUTEROL SULFATE 2.5 MG: 2.5 SOLUTION RESPIRATORY (INHALATION) at 23:26

## 2017-01-01 RX ADMIN — SIMVASTATIN 20 MG: 20 TABLET, FILM COATED ORAL at 22:00

## 2017-01-01 RX ADMIN — TAMSULOSIN HYDROCHLORIDE 0.4 MG: 0.4 CAPSULE ORAL at 19:18

## 2017-01-01 RX ADMIN — DULOXETINE 30 MG: 30 CAPSULE, DELAYED RELEASE ORAL at 23:36

## 2017-01-01 RX ADMIN — FAMOTIDINE 20 MG: 20 TABLET, FILM COATED ORAL at 21:41

## 2017-01-01 RX ADMIN — PRAMIPEXOLE DIHYDROCHLORIDE 0.12 MG: 0.25 TABLET ORAL at 22:00

## 2017-01-01 RX ADMIN — Medication 10 ML: at 21:46

## 2017-01-01 RX ADMIN — LEVOTHYROXINE SODIUM 100 MCG: 100 TABLET ORAL at 08:09

## 2017-01-01 RX ADMIN — LEVOTHYROXINE SODIUM 100 MCG: 100 TABLET ORAL at 07:19

## 2017-01-01 RX ADMIN — INSULIN LISPRO 2 UNITS: 100 INJECTION, SOLUTION INTRAVENOUS; SUBCUTANEOUS at 23:24

## 2017-01-01 RX ADMIN — LEVOFLOXACIN 750 MG: 5 INJECTION, SOLUTION INTRAVENOUS at 16:03

## 2017-01-01 RX ADMIN — FAMOTIDINE 20 MG: 20 TABLET, FILM COATED ORAL at 22:00

## 2017-01-01 RX ADMIN — ALBUTEROL SULFATE 2.5 MG: 2.5 SOLUTION RESPIRATORY (INHALATION) at 12:54

## 2017-01-01 RX ADMIN — DILTIAZEM HYDROCHLORIDE 30 MG: 30 TABLET, FILM COATED ORAL at 00:55

## 2017-01-01 RX ADMIN — ACETAMINOPHEN 650 MG: 325 TABLET, FILM COATED ORAL at 07:32

## 2017-01-01 RX ADMIN — Medication 10 ML: at 14:50

## 2017-01-01 RX ADMIN — HEPARIN SODIUM 5000 UNITS: 5000 INJECTION, SOLUTION INTRAVENOUS; SUBCUTANEOUS at 21:24

## 2017-01-01 RX ADMIN — Medication 10 ML: at 22:00

## 2017-01-01 RX ADMIN — DILTIAZEM HYDROCHLORIDE 30 MG: 30 TABLET, FILM COATED ORAL at 07:20

## 2017-01-01 RX ADMIN — PRAMIPEXOLE DIHYDROCHLORIDE 0.12 MG: 0.25 TABLET ORAL at 23:37

## 2017-01-01 RX ADMIN — LEVOTHYROXINE SODIUM 100 MCG: 100 TABLET ORAL at 05:55

## 2017-01-01 RX ADMIN — PRAMIPEXOLE DIHYDROCHLORIDE 0.12 MG: 0.25 TABLET ORAL at 21:21

## 2017-01-01 RX ADMIN — Medication 10 ML: at 22:01

## 2017-01-01 RX ADMIN — SIMETHICONE CHEW TAB 80 MG 80 MG: 80 TABLET ORAL at 21:21

## 2017-01-01 RX ADMIN — DIGOXIN 0.12 MG: 125 TABLET ORAL at 19:19

## 2017-01-01 RX ADMIN — DILTIAZEM HYDROCHLORIDE 30 MG: 30 TABLET, FILM COATED ORAL at 05:55

## 2017-01-01 RX ADMIN — DULOXETINE 30 MG: 30 CAPSULE, DELAYED RELEASE ORAL at 22:00

## 2017-01-01 RX ADMIN — DIGOXIN 0.12 MG: 125 TABLET ORAL at 08:25

## 2017-01-01 RX ADMIN — PREGABALIN 75 MG: 75 CAPSULE ORAL at 21:42

## 2017-01-01 RX ADMIN — FINASTERIDE 5 MG: 5 TABLET, FILM COATED ORAL at 09:43

## 2017-01-01 RX ADMIN — DILTIAZEM HYDROCHLORIDE 30 MG: 30 TABLET, FILM COATED ORAL at 23:37

## 2017-01-01 RX ADMIN — DILTIAZEM HYDROCHLORIDE 30 MG: 30 TABLET, FILM COATED ORAL at 14:50

## 2017-01-01 RX ADMIN — TAMSULOSIN HYDROCHLORIDE 0.4 MG: 0.4 CAPSULE ORAL at 17:32

## 2017-01-01 RX ADMIN — HEPARIN SODIUM 5000 UNITS: 5000 INJECTION, SOLUTION INTRAVENOUS; SUBCUTANEOUS at 19:27

## 2017-01-01 RX ADMIN — DILTIAZEM HYDROCHLORIDE 30 MG: 30 TABLET, FILM COATED ORAL at 23:36

## 2017-01-01 RX ADMIN — Medication 10 ML: at 14:03

## 2017-01-01 RX ADMIN — GLIPIZIDE 2.5 MG: 2.5 TABLET, FILM COATED, EXTENDED RELEASE ORAL at 08:25

## 2017-01-01 RX ADMIN — SITAGLIPTIN 25 MG: 25 TABLET, FILM COATED ORAL at 08:32

## 2017-01-01 RX ADMIN — DIGOXIN 0.12 MG: 125 TABLET ORAL at 20:41

## 2017-01-01 RX ADMIN — DILTIAZEM HYDROCHLORIDE 30 MG: 30 TABLET, FILM COATED ORAL at 05:33

## 2017-01-01 RX ADMIN — SITAGLIPTIN 25 MG: 25 TABLET, FILM COATED ORAL at 09:39

## 2017-01-01 RX ADMIN — SIMETHICONE CHEW TAB 80 MG 80 MG: 80 TABLET ORAL at 17:08

## 2017-01-01 RX ADMIN — INSULIN LISPRO 2 UNITS: 100 INJECTION, SOLUTION INTRAVENOUS; SUBCUTANEOUS at 18:07

## 2017-01-01 RX ADMIN — ALBUTEROL SULFATE 2.5 MG: 2.5 SOLUTION RESPIRATORY (INHALATION) at 20:01

## 2017-01-01 RX ADMIN — INSULIN LISPRO 2 UNITS: 100 INJECTION, SOLUTION INTRAVENOUS; SUBCUTANEOUS at 19:27

## 2017-01-01 RX ADMIN — TAMSULOSIN HYDROCHLORIDE 0.4 MG: 0.4 CAPSULE ORAL at 21:01

## 2017-01-01 RX ADMIN — ACETAMINOPHEN 650 MG: 325 TABLET, FILM COATED ORAL at 01:26

## 2017-01-01 RX ADMIN — LEVOFLOXACIN 750 MG: 5 INJECTION, SOLUTION INTRAVENOUS at 16:38

## 2017-01-01 RX ADMIN — Medication 10 ML: at 06:00

## 2017-01-01 RX ADMIN — ACETAMINOPHEN 650 MG: 325 TABLET, FILM COATED ORAL at 23:36

## 2017-01-01 RX ADMIN — CEFEPIME HYDROCHLORIDE 2 G: 2 INJECTION, POWDER, FOR SOLUTION INTRAVENOUS at 14:07

## 2017-01-01 RX ADMIN — DILTIAZEM HYDROCHLORIDE 30 MG: 30 TABLET, FILM COATED ORAL at 18:22

## 2017-01-01 RX ADMIN — DIGOXIN 0.12 MG: 125 TABLET ORAL at 08:32

## 2017-01-01 RX ADMIN — SIMETHICONE CHEW TAB 80 MG 80 MG: 80 TABLET ORAL at 21:02

## 2017-01-01 RX ADMIN — HEPARIN SODIUM 5000 UNITS: 5000 INJECTION, SOLUTION INTRAVENOUS; SUBCUTANEOUS at 08:09

## 2017-01-01 RX ADMIN — PANTOPRAZOLE SODIUM 40 MG: 40 TABLET, DELAYED RELEASE ORAL at 08:09

## 2017-01-01 RX ADMIN — ASPIRIN 81 MG: 81 TABLET, COATED ORAL at 09:43

## 2017-01-01 RX ADMIN — PANTOPRAZOLE SODIUM 40 MG: 40 TABLET, DELAYED RELEASE ORAL at 07:32

## 2017-01-01 RX ADMIN — FINASTERIDE 5 MG: 5 TABLET, FILM COATED ORAL at 08:15

## 2017-01-01 RX ADMIN — FINASTERIDE 5 MG: 5 TABLET, FILM COATED ORAL at 09:39

## 2017-01-01 RX ADMIN — SODIUM CHLORIDE 500 ML: 900 INJECTION, SOLUTION INTRAVENOUS at 23:00

## 2017-01-01 RX ADMIN — AMLODIPINE BESYLATE 5 MG: 5 TABLET ORAL at 08:32

## 2017-01-01 RX ADMIN — PRAMIPEXOLE DIHYDROCHLORIDE 0.12 MG: 0.25 TABLET ORAL at 21:42

## 2017-01-01 RX ADMIN — Medication 10 ML: at 05:29

## 2017-01-01 RX ADMIN — SIMETHICONE CHEW TAB 80 MG 80 MG: 80 TABLET ORAL at 16:37

## 2017-01-01 RX ADMIN — SIMETHICONE CHEW TAB 80 MG 80 MG: 80 TABLET ORAL at 08:32

## 2017-01-01 RX ADMIN — DIGOXIN 0.12 MG: 125 TABLET ORAL at 08:15

## 2017-01-01 RX ADMIN — PREGABALIN 75 MG: 75 CAPSULE ORAL at 22:00

## 2017-01-01 RX ADMIN — ALBUTEROL SULFATE 2.5 MG: 2.5 SOLUTION RESPIRATORY (INHALATION) at 12:35

## 2017-01-01 RX ADMIN — DIGOXIN 0.12 MG: 125 TABLET ORAL at 09:50

## 2017-01-01 RX ADMIN — DILTIAZEM HYDROCHLORIDE 30 MG: 30 TABLET, FILM COATED ORAL at 11:49

## 2017-01-01 RX ADMIN — SIMETHICONE CHEW TAB 80 MG 80 MG: 80 TABLET ORAL at 08:15

## 2017-01-01 RX ADMIN — GLIPIZIDE 2.5 MG: 2.5 TABLET, FILM COATED, EXTENDED RELEASE ORAL at 09:43

## 2017-01-01 RX ADMIN — DILTIAZEM HYDROCHLORIDE 30 MG: 30 TABLET, FILM COATED ORAL at 07:29

## 2017-01-01 RX ADMIN — CEFEPIME HYDROCHLORIDE 2 G: 2 INJECTION, POWDER, FOR SOLUTION INTRAVENOUS at 13:40

## 2017-01-01 RX ADMIN — ASPIRIN 81 MG: 81 TABLET, COATED ORAL at 09:39

## 2017-01-01 RX ADMIN — ACETAMINOPHEN 650 MG: 325 TABLET, FILM COATED ORAL at 05:54

## 2017-01-01 RX ADMIN — HEPARIN SODIUM 5000 UNITS: 5000 INJECTION, SOLUTION INTRAVENOUS; SUBCUTANEOUS at 07:43

## 2017-01-01 RX ADMIN — DIGOXIN 0.12 MG: 125 TABLET ORAL at 09:39

## 2017-01-01 RX ADMIN — PANTOPRAZOLE SODIUM 40 MG: 40 TABLET, DELAYED RELEASE ORAL at 07:19

## 2017-01-01 RX ADMIN — DILTIAZEM HYDROCHLORIDE 30 MG: 30 TABLET, FILM COATED ORAL at 17:08

## 2017-01-01 RX ADMIN — ACETAMINOPHEN 650 MG: 325 TABLET, FILM COATED ORAL at 20:54

## 2017-01-01 RX ADMIN — Medication 10 ML: at 14:07

## 2017-01-01 RX ADMIN — DILTIAZEM HYDROCHLORIDE 30 MG: 30 TABLET, FILM COATED ORAL at 19:18

## 2017-01-01 RX ADMIN — SIMETHICONE CHEW TAB 80 MG 80 MG: 80 TABLET ORAL at 09:39

## 2017-01-01 RX ADMIN — PANTOPRAZOLE SODIUM 40 MG: 40 TABLET, DELAYED RELEASE ORAL at 07:29

## 2017-01-01 RX ADMIN — SODIUM CHLORIDE 1000 ML: 900 INJECTION, SOLUTION INTRAVENOUS at 16:03

## 2017-01-01 RX ADMIN — ALBUTEROL SULFATE 2.5 MG: 2.5 SOLUTION RESPIRATORY (INHALATION) at 15:58

## 2017-01-01 RX ADMIN — CEFEPIME HYDROCHLORIDE 2 G: 2 INJECTION, POWDER, FOR SOLUTION INTRAVENOUS at 14:03

## 2017-01-01 RX ADMIN — ALBUTEROL SULFATE 2.5 MG: 2.5 SOLUTION RESPIRATORY (INHALATION) at 23:06

## 2017-01-01 RX ADMIN — DILTIAZEM HYDROCHLORIDE 30 MG: 30 TABLET, FILM COATED ORAL at 12:43

## 2017-01-01 RX ADMIN — FINASTERIDE 5 MG: 5 TABLET, FILM COATED ORAL at 08:32

## 2017-01-01 RX ADMIN — DILTIAZEM HYDROCHLORIDE 30 MG: 30 TABLET, FILM COATED ORAL at 23:24

## 2017-01-01 RX ADMIN — ACETAMINOPHEN 650 MG: 325 TABLET, FILM COATED ORAL at 21:22

## 2017-01-01 RX ADMIN — LEVOFLOXACIN 750 MG: 5 INJECTION, SOLUTION INTRAVENOUS at 18:11

## 2017-01-01 RX ADMIN — Medication 10 ML: at 17:08

## 2017-01-01 RX ADMIN — SIMVASTATIN 20 MG: 20 TABLET, FILM COATED ORAL at 21:02

## 2017-01-01 RX ADMIN — GLIPIZIDE 2.5 MG: 2.5 TABLET, FILM COATED, EXTENDED RELEASE ORAL at 09:39

## 2017-01-01 RX ADMIN — GLIPIZIDE 2.5 MG: 2.5 TABLET, FILM COATED, EXTENDED RELEASE ORAL at 08:15

## 2017-01-01 RX ADMIN — DILTIAZEM HYDROCHLORIDE 30 MG: 30 TABLET, FILM COATED ORAL at 12:47

## 2017-01-01 RX ADMIN — FAMOTIDINE 20 MG: 20 TABLET, FILM COATED ORAL at 23:36

## 2017-01-01 RX ADMIN — SITAGLIPTIN 25 MG: 25 TABLET, FILM COATED ORAL at 08:25

## 2017-01-01 RX ADMIN — SODIUM CHLORIDE 75 ML/HR: 900 INJECTION, SOLUTION INTRAVENOUS at 05:19

## 2017-01-01 RX ADMIN — HEPARIN SODIUM 5000 UNITS: 5000 INJECTION, SOLUTION INTRAVENOUS; SUBCUTANEOUS at 08:25

## 2017-11-29 PROBLEM — J18.9 PNEUMONIA: Status: ACTIVE | Noted: 2017-01-01

## 2017-11-29 NOTE — ED PROVIDER NOTES
HPI Comments: 80 y.o. male with past medical history significant for chronic A-fib, peripheral neuropathy, TIA x4, mitral regurgitation, CKD, dyslipidemia, NPH, Meniere disease, and HTN who presents from [facility] via EMS with chief complaint of recent falls. Falls yesterday, didn't hit his head but is unsure of any LOC. Family reports that the pt fell at [facility] (6 days ago) when he was in an unfamiliar location at a family member's home and hit the back of his head. +Recent cough. No nausea, vomiting or diarrhea. Family also notes that the pt's speech has seemed off -- slurred / \"thick tongue. \" Per family, pt uses a walker to ambulate. Pt denies taking any sleep aides last night. Per son, pt has a h/o taking multiple doses of Ambien and this medication had to be taken away from the pt a couple of years ago. Pt recently lost his wife. Pt has not taken any of his medications PTA today. Son notes that the pt is on a blood thinner. There are no other acute medical concerns at this time. Social hx: Never smoker. Weekly alcohol use. PCP: Cecilio Turner MD    Note written by Jass Serrato, as dictated by Gordon Navarro MD 2:49 PM      The history is provided by the patient. No  was used.         Past Medical History:   Diagnosis Date    Acute confusion 9/29/2012    Atrial fibrillation (HCC)     CHRONIC    Borderline diabetes mellitus     CKD (chronic kidney disease)     Diet-controlled type 2 diabetes mellitus ([address]     Dyslipidemia     Hypertension     Hypertensive cardiovascular disease     Meniere disease     Mitral regurgitation     echo 3-27-07    Neurological disorder     dizziness, tilt table test 5-4-07 unremarkable    NPH (normal pressure hydrocephalus)     Shunt Dr [facility]     Orthostatic hypotension     Other ill-defined conditions(799.89)     planter facia,2 achilles    Peripheral neuropathy     Stroke ([address]     4 TIA    TIA (transient ischemic attack)        Past Surgical History:   Procedure Laterality Date    HX APPENDECTOMY      HX CHOLECYSTECTOMY      HX HERNIA REPAIR      HX ORTHOPAEDIC      Left tkr,,rotator cuff    HX TURP           Family History:   Problem Relation Age of Onset    Heart Disease Father        Social History     Social History    Marital status:      Spouse name: N/A    Number of children: N/A    Years of education: N/A     Occupational History    Not on file. Social History Main Topics    Smoking status: Never Smoker    Smokeless tobacco: Never Used    Alcohol use Yes      Comment: weekly    Drug use: No    Sexual activity: Not Currently     Other Topics Concern    Not on file     Social History Narrative    Lives [place]         ALLERGIES: Erythromycin; Penicillins; and Tetanus vaccines and toxoid    Review of Systems   Constitutional: Negative for chills, diaphoresis, fatigue and fever. HENT: Negative for congestion, rhinorrhea, sinus pressure, sore throat, trouble swallowing and voice change. Eyes: Negative for photophobia and visual disturbance. Respiratory: Positive for cough. Negative for chest tightness and shortness of breath. Cardiovascular: Negative for chest pain, palpitations and leg swelling. Gastrointestinal: Negative for abdominal pain, blood in stool, constipation, diarrhea, nausea and vomiting. Musculoskeletal: Positive for gait problem (multiple falls). Negative for arthralgias, myalgias and neck pain. Neurological: Positive for speech difficulty. Negative for dizziness, weakness, light-headedness, numbness and headaches. Vitals:    11/29/17 1325 11/29/17 1336   BP: 144/65    Pulse: (!) 105    Resp: 20    Temp: 100.1 °F (37.8 °C)    SpO2: (!) 85% 98%   Weight: 83.9 kg (185 lb)    Height: 6' 1\" (1.854 m)             Physical Exam   Constitutional: He is oriented to person, place, and time.  He appears well-developed and well-nourished. No distress. Dehydrated appearance. Band aide over left forehead. HENT:   Head: Normocephalic and atraumatic. Nose: Nose normal.   Mouth/Throat: Oropharynx is clear and moist. Mucous membranes are dry. No oropharyngeal exudate. Eyes: Conjunctivae and EOM are normal. Right eye exhibits no discharge. Left eye exhibits no discharge. No scleral icterus. Neck: Normal range of motion. Neck supple. No JVD present. No tracheal deviation present. No thyromegaly present. Cardiovascular: Normal rate, regular rhythm, normal heart sounds and intact distal pulses. Exam reveals no gallop and no friction rub. No murmur heard. Pulmonary/Chest: Effort normal and breath sounds normal. No stridor. No respiratory distress. He has no wheezes. He has no rales. He exhibits no tenderness. Abdominal: Bowel sounds are normal. He exhibits no distension and no mass. There is no tenderness. There is no rebound. Musculoskeletal: Normal range of motion. He exhibits no edema or tenderness. Lymphadenopathy:     He has no cervical adenopathy. Neurological: He is alert and oriented to person, place, and time. No cranial nerve deficit. Coordination normal.   Skin: Skin is warm and dry. No rash noted. He is not diaphoretic. No erythema. No pallor. Psychiatric: He has a normal mood and affect.  His behavior is normal. Judgment and thought content normal.      Note written by Jass Galindo, as dictated by Kurtis Hawkins MD 2:52 PM      MDM  Number of Diagnoses or Management Options  Community acquired pneumonia of left lower lobe of lung [facility]):   Falls frequently:      Amount and/or Complexity of Data Reviewed  Clinical lab tests: ordered and reviewed  Tests in the radiology section of CPT®: ordered and reviewed  Obtain history from someone other than the patient: yes  Discuss the patient with other providers: yes  Independent visualization of images, tracings, or specimens: yes    Risk of Complications, Morbidity, and/or Mortality  Presenting problems: moderate  Diagnostic procedures: moderate  Management options: moderate    Patient Progress  Patient progress: stable    ED Course       Procedures    PROGRESS NOTE:  2:44 PM  Have discussed chest xray showing lower lobe PNA and talked about concerns of multiple falls. Have discussed plan for admission with family and pt who agree. 2:53 PM  CT negative    ED EKG interpretation:  Rhythm: atrial fib with RBBB; and irregular. Rate (approx.): 100; Axis: normal; ST/T wave: normal.   Note written by Jass Rico, as dictated by Eric Dalton MD 2:53 PM    3:12 PM  Have reviewed records sent from Pt's facility: Pt is not currently on any anticoagulation. CONSULT NOTE:  3:40 PM Eric Dalton MD spoke with Dr. Marisol Arteaga, Consult for Hospitalist.  Discussed available diagnostic tests and clinical findings. He is in agreement with care plans as outlined. Dr. Marisol Arteaga will see and admit the pt.       5:35 PM  Patient is being admitted to the hospital.  The results of their tests and reasons for their admission have been discussed with them and/or available family. They convey agreement and understanding for the need to be admitted and for their admission diagnosis. Consultation has been made with the inpatient physician specialist for hospitalization.     LABORATORY TESTS:  Recent Results (from the past 12 hour(s))   GLUCOSE, POC    Collection Time: 12/01/17  7:15 AM   Result Value Ref Range    Glucose (POC) 109 (H) 65 - 100 mg/dL    Performed by Stan Boeck, POC    Collection Time: 12/01/17 12:01 PM   Result Value Ref Range    Glucose (POC) 179 (H) 65 - 100 mg/dL    Performed by [address] [place] [address] ACID    Collection Time: 12/01/17  1:32 PM   Result Value Ref Range    Lactic acid 1.2 0.4 - 2.0 MMOL/L   NT-PRO BNP    Collection Time: 12/01/17  1:32 PM   Result Value Ref Range    NT pro-BNP 4815 (H) 0 - 450 PG/ML   GLUCOSE, POC    Collection Time: 12/01/17  4:29 PM   Result Value Ref Range    Glucose (POC) 165 (H) 65 - 100 mg/dL    Performed by Avani tSeward        IMAGING RESULTS:  XR CHEST PORT   Final Result      MRI BRAIN WO CONT   Final Result      US RETROPERITONEUM COMP   Final Result      XR SHOULDER LT AP/LAT MIN 2 V   Final Result      CT HEAD WO CONT   Final Result      XR CHEST PA LAT   Final Result        Xr Chest Port    Result Date: 12/1/2017  Indication: Dyspnea Comparison: 11/29/2017 Portable exam of the chest obtained at 1143 demonstrates stable cardiomegaly. Pulmonary edema is noted. There are small bilateral pleural effusions. The osseous structures are unremarkable. Impression: Pulmonary edema. Small bilateral pleural effusions.         MEDICATIONS GIVEN:  Medications   digoxin (LANOXIN) tablet 0.125 mg (0.125 mg Oral Given 11/30/17 1710)   DULoxetine (CYMBALTA) capsule 30 mg (30 mg Oral Given 11/30/17 2102)   finasteride (PROSCAR) tablet 5 mg (5 mg Oral Given 12/1/17 0815)   glipiZIDE SR (GLUCOTROL XL) tablet 2.5 mg (2.5 mg Oral Given 12/1/17 0815)   levothyroxine (SYNTHROID) tablet 100 mcg (100 mcg Oral Given 12/1/17 0729)   SITagliptin (JANUVIA) tablet tab 25 mg (25 mg Oral Given 12/1/17 0814)   pantoprazole (PROTONIX) tablet 40 mg (40 mg Oral Given 12/1/17 0729)   pramipexole (MIRAPEX) tablet 0.125 mg (0.125 mg Oral Given 11/30/17 2101)   pregabalin (LYRICA) capsule 75 mg (75 mg Oral Given 11/30/17 2101)   famotidine (PEPCID) tablet 20 mg (20 mg Oral Given 11/30/17 2101)   simethicone (MYLICON) tablet 80 mg (80 mg Oral Given 12/1/17 0815)   simvastatin (ZOCOR) tablet 20 mg (20 mg Oral Given 11/30/17 2102)   sodium chloride (NS) flush 5-10 mL (10 mL IntraVENous Given 12/1/17 1450)   sodium chloride (NS) flush 5-10 mL (not administered)   0.9% sodium chloride infusion (75 mL/hr IntraVENous New Bag 11/30/17 1600)   levoFLOXacin (LEVAQUIN) 750 mg in D5W IVPB (not administered)   albuterol (PROVENTIL VENTOLIN) nebulizer solution 2.5 mg (2.5 mg Nebulization Given 12/1/17 1558)   glucose chewable tablet 16 g (not administered)   dextrose (D50W) injection syrg 12.5-25 g (not administered)   glucagon (GLUCAGEN) injection 1 mg (not administered)   digoxin (LANOXIN) tablet 0.125 mg (0.125 mg Oral Given 12/1/17 0815)   dilTIAZem (CARDIZEM) IR tablet 30 mg (30 mg Oral Given 12/1/17 1450)   acetaminophen (TYLENOL) tablet 650 mg (650 mg Oral Given 11/30/17 2127)   aspirin delayed-release tablet 81 mg (81 mg Oral Given 12/1/17 0814)   heparin (porcine) injection 5,000 Units (5,000 Units SubCUTAneous Given 12/1/17 0814)   cefepime (MAXIPIME) 2 g in 0.9% sodium chloride (MBP/ADV) 100 mL (2 g IntraVENous New Bag 12/1/17 1340)   tamsulosin (FLOMAX) capsule 0.4 mg (not administered)   cranberry-vitamin c-vitamin e 140-100 mg cap 2 Cap (Patient Supplied) (not administered)   insulin lispro (HUMALOG) injection (not administered)   sodium chloride 0.9 % bolus infusion 1,000 mL (0 mL IntraVENous IV Completed 11/29/17 1710)   levoFLOXacin (LEVAQUIN) 750 mg in D5W IVPB (750 mg IntraVENous New Bag 11/29/17 1603)   amLODIPine (NORVASC) tablet 5 mg (5 mg Oral Given 11/29/17 2041)   digoxin (LANOXIN) tablet 0.125 mg (0.125 mg Oral Given 11/29/17 2041)   sodium chloride 0.9 % bolus infusion 500 mL (500 mL IntraVENous New Bag 11/29/17 2300)   furosemide (LASIX) injection 40 mg (40 mg IntraVENous Given 12/1/17 1450)       IMPRESSION:  1. Falls frequently    2. Community acquired pneumonia of left lower lobe of lung ([address]    3. NPH (normal pressure hydrocephalus)        PLAN:  1.  Admit to Franco Navarro MD

## 2017-11-29 NOTE — ED NOTES
Resting quietly, watching TV with family member. IV site intact and patent with IVF and medication infusion.

## 2017-11-29 NOTE — ROUTINE PROCESS
TRANSFER - OUT REPORT:    Verbal report given to Aron Vargas RN(name) on Ravinder Naidu  being transferred to [facility](unit) for routine progression of care       Report consisted of patients Situation, Background, Assessment and   Recommendations(SBAR). Information from the following report(s) SBAR was reviewed with the receiving nurse. Lines:   Peripheral IV 11/29/17 Right Antecubital (Active)        Opportunity for questions and clarification was provided.       Patient transported with:   Monitor and oxygen at 2 liters via NC

## 2017-11-29 NOTE — H&P
[address]   [address] [facility], [address] [place] [address]   HISTORY AND PHYSICAL       Name:  Cruz Phoenix   MR#:  [id number]   :  1923   Account #:  [de-identified]        Date of Adm:  2017       CHIEF COMPLAINT: A fall. HISTORY OF PRESENT ILLNESS: The patient is a 80year-old   gentleman with past medical history of atrial fibrillation on Xarelto,   history of NPH with a  shunt, history of abnormal EKG,   hypertension, peripheral neuropathy, borderline diabetes, dyslipidemia,   Meniere disease, sensorineural hearing loss, who presents to the   hospital with the above-mentioned symptoms. History was obtained   from the daughter-in-law, who is present at the bedside, as well as the   patient himself. The daughter-in-law reports that the patient lives at   [facility] and was found this morning on the floor after   a fall. The daughter-in-law reports that the patient has been having   increasing falls. He was away from home on  and had a   fall where he hurt the back of his head, but did not seek medical   evaluation. Today, the patient was found by the staff at the assisted   living facility and was transferred over here for further management   and evaluation. The daughter-in-law also reports that the patient   usually is alert, oriented and \"pleasantly flirtatious,\" but has not been   doing that for the past few days. He has seen more lethargic, more   confused and has been speaking \"with a thick tongue\" with some   slurring of his words. The patient does admit to some recent cough. Per daughter-in-law, the patient uses a walker to ambulate and does   not take any sleep aids at night. Per daughter-in-law, the patient is on   Xarelto and has not been taking the medication for the last many days   because the facility was concerned that the patient had been having   multiple falls.  The patient recently lost his wife and the daughter-in-law   feels that some of these symptoms are secondary to his wife's death. The patient was brought to the hospital and was found to have a   pneumonia and was requested to be admitted under the hospitalist   service. The patient is currently resting in bed and appears to be alert   x2, denies any other complaints or problems. Denies any headache,   blurry vision, sore throat, trouble swallowing, trouble with speech. Denies any chest pain, abdominal pain, constipation, diarrhea, urinary   symptoms, focal or generalized neurological weakness, recent travel,   sick contacts, any hematemesis, melena, hemoptysis. The patient   reports that he does not have any pain anywhere and has no trouble   moving any of his limbs, except for the left shoulder where he reports   that he has had chronic pain due to rotator cuff tear. The patient denies   any other complaints or problems. PAST MEDICAL HISTORY: See above. HOME MEDICATIONS:   1. Digoxin 0.125 mg daily and an additional 0.125 mg on Monday,   Wednesday, Thursday and Saturday. 2. Cymbalta 30 mg daily. 3. Glipizide 2.5 mg daily. 4. Levothyroxine 100 mcg daily. 5. Losartan 100 mg daily. 6. Fish oil . 7. Ranitidine 300 mg nightly. 8. Simethicone 80 mg b.i.d. as needed. 9. Tradjenta 5 mg daily. 10. Trazodone 200 mg nightly. 11. Cholecalciferol 5000 units. 12. Clindamycin 600 units by mouth as needed. 13. Lyrica 400 mg nightly. 14. Mirapex 0.125 mg nightly. 15. Amlodipine 5 mg daily. 16. Proscar 5 mg daily. 17. Simvastatin 20 mg daily. 18. Flomax 0.4 mg daily. 19. Omeprazole 20 mg daily. SOCIAL HISTORY: Denies tobacco abuse. Occasional alcohol use. Denies IV drug abuse. Lives at home. FAMILY HISTORY: Father had history of heart disease. REVIEW OF SYSTEMS: Somewhat suboptimal secondary to the   patient being mildly confused, but patient denies any other complaints   or problems on all system review. ALLERGIES:   1. ERYTHROMYCIN.    2. PENICILLIN. 3. TETANUS. FAMILY HISTORY: Was discussed and was found to be   noncontributory. PHYSICAL EXAMINATION:   VITAL SIGNS: Temperature 97.8, pulse 88, respiratory rate 20, blood   pressure , pulse oximetry 97% on room air. GENERAL: Alert x2, awake, no acute distress, resting in bed, pleasant   male, appears to be stated age. HEENT: There is a Band-Aid on the left forehead. Pupils equal and   reactive to light. Dry mucous membranes. Tympanic membranes clear. NECK: Supple. CHEST: Decreased basal breath sounds with scattered coarse   rhonchi. CORONARY: S1, S2 were heard. ABDOMEN: Soft, nontender, nondistended. Bowel sounds are   physiological.   EXTREMITIES: No clubbing, no cyanosis, no edema. NEUROPSYCHIATRIC: Pleasant mood and affect. Somewhat limited   exam as the patient is not compliant. Cranial nerves II-XII could not be   tested. The patient moves all 4. Strength appears to be 5/5 into all 4   extremities. Sensory appears to be grossly within normal limits. SKIN: Warm. LABORATORY DATA: White count 14.6, hemoglobin 14, hematocrit   42.8, platelets 317. Sodium 138, potassium 4.5, chloride 107,   bicarbonate 24, anion gap 7, glucose 202, BUN 35, creatinine 1.96,   calcium 10.1, bilirubin total 1.38, ALT 17, AST 22, alkaline   phosphatase 98. CT of the head shows no acute intracranial   hemorrhage. X-ray of the chest shows left lower lobe pneumonia,   followup resolution is suggested. EKG shows atrial fibrillation, left axis   deviation. Currently, the patient's vent rate is around 88. ASSESSMENT AND PLAN:   1. Community-acquired pneumonia. The patient will be admitted on a   telemetry bed. Will start the patient on broad-spectrum antibiotic of   Levaquin. Blood cultures were not drawn in the ER and a dose of   antibiotic has been given, but it may be  to draw a set of blood   cultures post-antibiotics. Regardless, will continue to closely monitor.    Will provide nebulizers, supportive care with oxygen and will continue   to closely monitor. May consider further intervention if symptoms   persist. Will reassess as needed. 2. Multiple falls. I suspect that this is secondary to the patient's normal   pressure hydrocephalus and is getting worse. May consider getting a   Neurosurgical consult in the morning. The patient had this done with    [employer] at [facility]. Will try to obtain records from [facility]. Will put the   patient on fall precautions. Will rule out stroke, especially in light of   some slurred speech. Will get an MRI of the brain, neurovascular   checks. Will hold starting the patient on antiplatelets at this point of   time until there is a definite etiology of stroke. Will continue to closely   monitor and further intervention will be per hospital course. The patient   is a fall risk and thus, the patient will be on fall precautions. Will get CK   levels and further intervention will be per hospital course. Will also get   a Physical Therapy consult. 3. Atrial fibrillation. The patient currently not on anticoagulation   secondary to falls. Will continue digoxin. Will get a digoxin level and   continue to monitor heart rate. The patient came in mildly tachycardic,   but vent rate currently is in the 80s. Will continue to monitor the patient   on telemetry and further intervention will be per hospital course. Reassess as needed. 4. Diabetes, suboptimally controlled. The patient will be on sliding   scale  insulin, Accu-Cheks and diet control. 5. Acute renal failure, most likely prerenal. Will start the patient on   gentle IV hydration and monitor creatinine. Avoid nephrotoxic   medication and renally dose all other medication. Further intervention   will be per hospital course. Will reassess as needed. 6. Leukocytosis, most likely secondary to #1. Will continue to closely   monitor. 7. Hypertension. Continue home medication.    8. Gastrointestinal/deep venous thrombosis prophylaxis. The patient   will be on sequential compression devices.          MD Maria G Gonzalez / [employer] [facility]   D:  11/29/2017   17:13   T:  11/29/2017   18:40   Job #:  [id number]

## 2017-11-29 NOTE — PROGRESS NOTES
Admission Medication Reconciliation:    Information obtained from:  [facility] from [facility] in [place]. Comments/Recommendations: Updated PTA meds/reviewed patient's allergies. 1)  Per daughter-in-law, patient fell last night, found this AM and was transported here. Last medications given per her were bedtime doses. 2)  Multiple medications changes since last Med Rec in June 2016. Added  - Digoxin: added 4 x weekly evening doses in addition to daily dose  - Glipizide XL  - Losartan  - Tradjenta  - Trazodone  - Cleocin (prn dental visits)  - Ranitidine  - Simethicone  - Omega 3   - Cholecalciferol    Changed   - Lyrica from 200 mg daily to 400 mg QHS  - Pramipexole from TID to 1 tablet QHS  - Flomax from daily to QHS    Removed  - Diltiazem  mg daily  - Gabapentin 300 mg QHS  - Norco 5/325 1-2 Q4hrs (#12)  - Meclizine 5 mg QID prn  - Ambien 10 mg QHS prn       Significant PMH/Disease States:   Past Medical History:   Diagnosis Date    Acute confusion 9/29/2012    Atrial fibrillation (HCC)     CHRONIC    Borderline diabetes mellitus     CKD (chronic kidney disease)     Diet-controlled type 2 diabetes mellitus ([address]     Dyslipidemia     Hypertension     Hypertensive cardiovascular disease     Meniere disease     Mitral regurgitation     echo 3-27-07    Neurological disorder     dizziness, tilt table test 5-4-07 unremarkable    NPH (normal pressure hydrocephalus)     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 Taking? DULoxetine (CYMBALTA) 30 mg capsule 11/28/2017 at Unknown time  Yes Yes   Sig: Take 30 mg by mouth nightly. amLODIPine (NORVASC) 5 mg tablet 11/28/2017 at Unknown time  Yes Yes   Sig: Take 5 mg by mouth daily. cholecalciferol, VITAMIN D3, (VITAMIN D3) 5,000 unit tab tablet   Yes Yes   Sig: Take 5,000 Units by mouth daily (with lunch). clindamycin (CLEOCIN) 300 mg capsule   Yes Yes   Sig: Take 600 mg by mouth once as needed (Prior to dental procedures). digoxin (LANOXIN) 0.125 mg tablet 11/27/2017 at Unknown time  Yes Yes   Sig: Take 0.125 mg by mouth four (4) days a week. Daily in the AM and in the evenings on Mon, Wed, Thurs, Sat (see additional order)   digoxin (LANOXIN) 0.125 mg tablet 11/28/2017 at Unknown time  Yes Yes   Sig: Take 0.125 mg by mouth daily. Daily in the AM and in the evenings on Mon, Wed, Thurs, Sat (see additional order)   finasteride (PROSCAR) 5 mg tablet 11/28/2017 at Unknown time  Yes Yes   Sig: Take 5 mg by mouth daily. fish oil-omega-3 fatty acids 340-1,000 mg capsule 11/28/2017 at Unknown time  Yes Yes   Sig: Take 1 Cap by mouth two (2) times a day. Lunch and Bedtime   glipiZIDE SR (GLUCOTROL XL) 2.5 mg CR tablet 11/28/2017 at Unknown time  Yes Yes   Sig: Take 2.5 mg by mouth daily. levothyroxine (SYNTHROID) 100 mcg tablet 11/28/2017 at Unknown time  Yes Yes   Sig: Take 100 mcg by mouth Daily (before breakfast). linagliptin (TRADJENTA) 5 mg tablet 11/28/2017 at Unknown time  Yes Yes   Sig: Take 5 mg by mouth daily. losartan (COZAAR) 100 mg tablet 11/28/2017 at Unknown time  Yes Yes   Sig: Take 100 mg by mouth daily. omeprazole (PRILOSEC) 20 mg capsule 11/28/2017 at Unknown time  Yes Yes   Sig: Take 20 mg by mouth Daily (before breakfast). pramipexole (MIRAPEX) 0.125 mg tablet 11/28/2017 at Unknown time  Yes Yes   Sig: Take 0.125 mg by mouth nightly.    pregabalin (LYRICA) 200 mg capsule 11/28/2017 at Unknown time  Yes Yes   Sig: Take 400 mg by mouth nightly. raNITIdine (ZANTAC) 300 mg tablet 11/28/2017 at Unknown time  Yes Yes   Sig: Take 300 mg by mouth nightly. simethicone (MYLICON) 80 mg chewable tablet 11/29/2017 at Unknown time  Yes Yes   Sig: Take 80 mg by mouth three (3) times daily. Indications: FLATULENCE   simvastatin (ZOCOR) 20 mg tablet 11/28/2017 at Unknown time  Yes Yes   Sig: Take 20 mg by mouth nightly. tamsulosin (FLOMAX) 0.4 mg capsule 11/28/2017 at Unknown time  Yes Yes   Sig: Take 0.4 mg by mouth nightly. traZODone (DESYREL) 100 mg tablet 11/28/2017 at Unknown time  Yes Yes   Sig: Take 200 mg by mouth nightly.       Facility-Administered Medications: None

## 2017-11-29 NOTE — ED NOTES
Per daughter in law at bedside, pt is not at baseline, more lethargic and voice sounds different. States pt fell last week on Thanksgiving. Hx chronic A.fib.

## 2017-11-29 NOTE — ED NOTES
Report received, care assumed, family member with pt. Note antibiotic order, no blood culture/lactic acid order, reviewed with ER MD, no new orders.

## 2017-11-29 NOTE — IP AVS SNAPSHOT
[facility] [address] [place] [address] 
[phone] Patient: Monique Park MRN: [id number] FNO:[id number] About your hospitalization You were admitted on:  November 29, 2017 You last received care in the:  [facility] You were discharged on:  December 4, 2017 Why you were hospitalized Your primary diagnosis was:  Pneumonia Your diagnoses also included:  Nph (Normal Pressure Hydrocephalus), Recurrent Falls Things You Need To Do (next 8 weeks) Follow up with Talha Olivares MD in 1 week(s) Follow Up Phone:  [phone] Where:  [address] [place] [id number] Discharge Orders None A check soledad indicates which time of day the medication should be taken. My Medications STOP taking these medications   
 losartan 100 mg tablet Commonly known as:  COZAAR  
   
  
  
TAKE these medications as instructed Instructions Each Dose to Equal  
 Morning Noon Evening Bedtime  
 amLODIPine 5 mg tablet Commonly known as:  Mike Alstrom Your last dose was: Your next dose is: Take 5 mg by mouth daily. 5 mg  
    
   
   
   
  
 aspirin delayed-release 81 mg tablet Your last dose was: Your next dose is: Take 1 Tab by mouth daily. 81 mg CARDIZEM  mg ER capsule Generic drug:  dilTIAZem CD Your last dose was: Your next dose is: Take 1 Cap by mouth daily. 120 mg  
    
   
   
   
  
 cholecalciferol (VITAMIN D3) 5,000 unit Tab tablet Commonly known as:  VITAMIN D3 Your last dose was: Your next dose is: Take 5,000 Units by mouth daily (with lunch). 5000 Units  
    
   
   
   
  
 clindamycin 300 mg capsule Commonly known as:  CLEOCIN Your last dose was: Your next dose is: Take 600 mg by mouth once as needed (Prior to dental procedures). 600 mg CRANBERRY PLUS VITAMIN C 140-100 mg Cap Generic drug:  cranberry-vitamin c-vitamin e Your last dose was: Your next dose is: Take 2 Caps by mouth daily. 2 Cap * digoxin 0.125 mg tablet Commonly known as:  LANOXIN Your last dose was: Your next dose is: Take 0.125 mg by mouth daily. Daily in the AM and in the evenings on Mon, Wed, Thurs, Sat (see additional order) 0.125 mg  
    
   
   
   
  
 * digoxin 0.125 mg tablet Commonly known as:  LANOXIN Your last dose was: Your next dose is: Take 0.125 mg by mouth four (4) days a week. Daily in the AM and in the evenings on Mon, Wed, Thurs, Sat (see additional order) 0.125 mg DULoxetine 30 mg capsule Commonly known as:  CYMBALTA Your last dose was: Your next dose is: Take 30 mg by mouth nightly. 30 mg  
    
   
   
   
  
 finasteride 5 mg tablet Commonly known as:  PROSCAR Your last dose was: Your next dose is: Take 5 mg by mouth daily. 5 mg  
    
   
   
   
  
 fish oil-omega-3 fatty acids 340-1,000 mg capsule Your last dose was: Your next dose is: Take 1 Cap by mouth two (2) times a day. Lunch and Bedtime 1 Cap FLOMAX 0.4 mg capsule Generic drug:  tamsulosin Your last dose was: Your next dose is: Take 0.4 mg by mouth nightly. 0.4 mg  
    
   
   
   
  
 glipiZIDE SR 2.5 mg CR tablet Commonly known as:  GLUCOTROL XL Your last dose was: Your next dose is: Take 2.5 mg by mouth daily. 2.5 mg Lactobacillus Acidoph & Bulgar 1 million cell Tab tablet Commonly known as:  Pily Alcaraz Your last dose was: Your next dose is: Take 2 Tabs by mouth two (2) times a day. 2 Tab  
    
   
   
   
  
 levoFLOXacin 750 mg tablet Commonly known as:  Valentina Wells Your last dose was: Your next dose is: Take 1 Tab by mouth every fourty-eight (48) hours for 3 doses. 750 mg  
    
   
   
   
  
 levothyroxine 100 mcg tablet Commonly known as:  SYNTHROID Your last dose was: Your next dose is: Take 100 mcg by mouth Daily (before breakfast). 100 mcg  
    
   
   
   
  
 omeprazole 20 mg capsule Commonly known as:  PRILOSEC Your last dose was: Your next dose is: Take 20 mg by mouth Daily (before breakfast). 20 mg  
    
   
   
   
  
 pramipexole 0.125 mg tablet Commonly known as:  MIRAPEX Your last dose was: Your next dose is: Take 0.125 mg by mouth nightly. 0.125 mg  
    
   
   
   
  
 pregabalin 200 mg capsule Commonly known as:  Herb Bustle Your last dose was: Your next dose is: Take 400 mg by mouth nightly. 400 mg  
    
   
   
   
  
 raNITIdine 300 mg tablet Commonly known as:  ZANTAC Your last dose was: Your next dose is: Take 300 mg by mouth nightly. 300 mg  
    
   
   
   
  
 simethicone 80 mg chewable tablet Commonly known as:  Abdi Asp Your last dose was: Your next dose is: Take 80 mg by mouth three (3) times daily. Indications: FLATULENCE  
 80 mg  
    
   
   
   
  
 simvastatin 20 mg tablet Commonly known as:  ZOCOR Your last dose was: Your next dose is: Take 20 mg by mouth nightly. 20 mg  
    
   
   
   
  
 TRADJENTA 5 mg tablet Generic drug:  linagliptin Your last dose was: Your next dose is: Take 5 mg by mouth daily. 5 mg  
    
   
   
   
  
 traZODone 100 mg tablet Commonly known as:  Roel Cantrell  
   
 Your last dose was: Your next dose is: Take 200 mg by mouth nightly. 200 mg * Notice: This list has 2 medication(s) that are the same as other medications prescribed for you. Read the directions carefully, and ask your doctor or other care provider to review them with you. Where to Get Your Medications Information on where to get these meds will be given to you by the nurse or doctor. ! Ask your nurse or doctor about these medications  
  levoFLOXacin 750 mg tablet Discharge Instructions None [employer] Announcement We are excited to announce that we are making your provider's discharge notes available to you in [employer]. You will see these notes when they are completed and signed by the physician that discharged you from your recent hospital stay. If you have any questions or concerns about any information you see in [employer], please call the Health Information Department where you were seen or reach out to your Primary Care Provider for more information about your plan of care. Introducing [facility]! Silvino Dee introduces [employer] patient portal. Now you can access parts of your medical record, email your doctor's office, and request medication refills online. 1. In your internet browser, go to [email or web address] 2. Click on the First Time User? Click Here link in the Sign In box. You will see the New Member Sign Up page. 3. Enter your [employer] Access Code exactly as it appears below. You will not need to use this code after youve completed the sign-up process. If you do not sign up before the expiration date, you must request a new code. · [employer] Access Code: [email or web address] Expires: 3/4/2018  8:52 AM 
 
4. Enter the last four digits of your Social Security Number (xxxx) and Date of Birth (mm/dd/yyyy) as indicated and click Submit.  You will be taken to the next sign-up page. 5. Create a [employer] ID. This will be your [employer] login ID and cannot be changed, so think of one that is secure and easy to remember. 6. Create a [employer] password. You can change your password at any time. 7. Enter your Password Reset Question and Answer. This can be used at a later time if you forget your password. 8. Enter your e-mail address. You will receive e-mail notification when new information is available in [address] 9. Click Sign Up. You can now view and download portions of your medical record. 10. Click the Download Summary menu link to download a portable copy of your medical information. If you have questions, please visit the Frequently Asked Questions section of the [employer] website. Remember, [employer] is NOT to be used for urgent needs. For medical emergencies, dial 911. Now available from your iPhone and Android! Unresulted Labs-Please follow up with your PCP about these lab tests Order Current Status CULTURE, BLOOD, PAIRED Preliminary result CULTURE, BLOOD, PAIRED Preliminary result Providers Seen During Your Hospitalization Provider Specialty Primary office phone Robb Dickey MD Emergency Medicine [phone] Tom Pinzon MD Hospitalist [phone] Ron Samayoa MD Internal Medicine [phone] Your Primary Care Physician (PCP) Primary Care Physician Office Phone Office Fax Jacqui Naylor [phone] You are allergic to the following Allergen Reactions Erythromycin Rash Penicillins Rash Tetanus Vaccines And Toxoid Rash Recent Documentation Height Weight BMI Smoking Status 1.854 m 82.8 kg 24.09 kg/m2 Never Smoker Emergency Contacts Name Discharge Info Relation Home Work Mobile Giacomo Velazquez DISCHARGE CAREGIVER [3] Child [2]   [phone] HerberlaurenDolores  Spouse [3] [phone] Patient Belongings The following personal items are in your possession at time of discharge: 
  Dental Appliances: None  Visual Aid: Glasses      Home Medications: None   Jewelry: None  Clothing: With patient    Other Valuables: None Please provide this summary of care documentation to your next provider. Signatures-by signing, you are acknowledging that this After Visit Summary has been reviewed with you and you have received a copy. Patient Signature:  ____________________________________________________________ Date:  ____________________________________________________________  
  
[facility] Provider Signature:  ____________________________________________________________ Date:  ____________________________________________________________

## 2017-11-29 NOTE — ED TRIAGE NOTES
Triage Note: Patient is coming in from [facility] that suffered a ground level fall. Patient has laceration over the left eye. Unsure if in independent living or nursing home.

## 2017-11-30 PROBLEM — G91.2 NPH (NORMAL PRESSURE HYDROCEPHALUS) (HCC): Status: ACTIVE | Noted: 2017-01-01

## 2017-11-30 PROBLEM — R29.6 RECURRENT FALLS: Status: ACTIVE | Noted: 2017-01-01

## 2017-11-30 NOTE — PROGRESS NOTES
TRANSFER - IN REPORT:    Verbal report received from [address], RN(name) on Cecilia Pipes  being received from ED(unit) for routine progression of care      Report consisted of patients Situation, Background, Assessment and   Recommendations(SBAR). Information from the following report(s) SBAR, Kardex, ED Summary, Intake/Output, MAR, Recent Results and Cardiac Rhythm A-fib was reviewed with the receiving nurse. Opportunity for questions and clarification was provided. Assessment completed upon patients arrival to unit and care assumed. Primary Nurse Kip Mejia and Anabel Laureano RN performed a dual skin assessment on this patient Impairment noted- see wound doc flow sheet (Laceration about left eye, scattered bruising). Brendan score is 17      Bedside shift change report given to Chano Gudino RN (oncoming nurse) by Riya Meyer RN (offgoing nurse). Report included the following information SBAR, Kardex, ED Summary, Intake/Output, MAR, Recent Results and Cardiac Rhythm A-fib.

## 2017-11-30 NOTE — PROGRESS NOTES
2000: Bedside and Verbal shift change report given to [address] [place] [address] (oncoming nurse) by Jayson Stoddard (offgoing nurse). Report included the following information SBAR, Kardex, Intake/Output, MAR, Accordion, Recent Results, Med Rec Status, Cardiac Rhythm Afib and Alarm Parameters . 2040: Paged Dr. Imelda Mcfarlane for patients temp 101.2. Received orders to give PRN tylenol and draw a stat lactic    2210: Received critical lactic from Ly in lab 2.3, paged Dr. Imelda Mcfarlane received orders for 500 cc normal saline bolus then continuous normal saline at 75ml/hr, paired blood cultures, UA and Urine culture and re-draw lactic in 4 hours    2300: Weaned patient from 2L NC to RA, O2 sats remain 98%    Hourly rounds completed throughout shift.

## 2017-11-30 NOTE — PROGRESS NOTES
Consult received, chart reviewed, case discussed at rounds. Pt is presently off the unit for an MRI. PT will continue to follow and see for eval and appropriate.  Thank you, Yocasta Meidna, PT

## 2017-11-30 NOTE — CDMP QUERY
Dr. Kelly Palm,     Please clarify if this patient is being treated/managed for:    =>Possible UTI in the setting of abnormal UA requiring IV abxs  =>Other Explanation of clinical findings  =>Unable to Determine (no explanation of clinical findings)    The medical record reflects the following clinical findings, treatment, and risk factors:    Risk Factors: 79 y/o pt  Clinical Indicators: UA: Positive nitrites, bacteria 2+  Treatment: IV abxs    Please clarify and document your clinical opinion in the progress notes and discharge summary including the definitive and/or presumptive diagnosis, (suspected or probable), related to the above clinical findings. Please include clinical findings supporting your diagnosis.     Thank you,   Karen Christianson, [address]

## 2017-11-30 NOTE — CONSULTS
NEUROLOGY CONSULT NOTE    Name Marilee Patient Age 80 y.o. MRN [id number]  1923     Consulting Physician: Evert Gomez MD      Chief Complaint:  Fall     Assessment:     Principal Problem:    Pneumonia (2017)    Active Problems:    NPH (normal pressure hydrocephalus) (2017)      Recurrent falls (2017)      80year old RHM h/o Afib, NPH s/p VPS, HTN, HPL, DM, Meniere disease presenting with recurrent falls, recent head injury w/transient dysarthria and mild encephalopathy, now back to his baseline. MRI Brain completed and without evidence of acute ischemia or hydrocephalus that would suggest shunt malfunction. There are no significant proprioceptive deficits on examination to suggest a severe peripheral neuropathy (although he endorses h/o neuropathy) that would be contributing to falls. Suspect recent increase in falls may be multifactorial and secondary to NPH as well as possible encephalopathy from recent infectious process (UTI/PNA). Recommendations: Tx of UTI/PNA per primary team  Agree with withholding [address] presently given recent recurrent falls and bleeding risk- should start ASA for stroke prevention. PT/OT evaluation    Thank you very much for this referral. I appreciate the opportunity to participate in this patient's care. History of Present Illness: This is a 80 y.o.  right handed  male, we were asked to see for fall. PMH notable for Afib recently taken off of OAC/Xarelto, NPH s/p VPS, HTN, HPL, DM, PN, Meniere disease. Pt was found on the floor yesterday morning after a fall prompting current admission. He was noted to have slurring of his words and slight confusion out of proportion to baseline. Family also reports another recent fall on  with head injury- he did not seek medical attention at the time. Noted with PNA this admission and +UA/UTI as well as GEORGINA.   CT head on admission did not reveal any acute process. MRI Brain subsequently completed without acute ischemia or hydrocephalus, noted cortical atrophy. Family and pt feel he is doing much better today. At baseline, he is oriented x 3 and independent for ADLs, handles his own finances and uses a walker for ambulation. Allergies   Allergen Reactions    Erythromycin Rash    Penicillins Rash    Tetanus Vaccines And Toxoid Rash        Prior to Admission medications    Medication Sig Start Date End Date Taking? Authorizing Provider   digoxin (LANOXIN) 0.125 mg tablet Take 0.125 mg by mouth daily. Daily in the AM and in the evenings on Mon, Wed, Thurs, Sat (see additional order)   Yes Historical Provider   DULoxetine (CYMBALTA) 30 mg capsule Take 30 mg by mouth nightly. Yes Historical Provider   glipiZIDE SR (GLUCOTROL XL) 2.5 mg CR tablet Take 2.5 mg by mouth daily. Yes Historical Provider   levothyroxine (SYNTHROID) 100 mcg tablet Take 100 mcg by mouth Daily (before breakfast). Yes Historical Provider   losartan (COZAAR) 100 mg tablet Take 100 mg by mouth daily. Yes Historical Provider   fish oil-omega-3 fatty acids 340-1,000 mg capsule Take 1 Cap by mouth two (2) times a day. Lunch and Bedtime   Yes Historical Provider   raNITIdine (ZANTAC) 300 mg tablet Take 300 mg by mouth nightly. Yes Historical Provider   simethicone (MYLICON) 80 mg chewable tablet Take 80 mg by mouth three (3) times daily. Indications: FLATULENCE   Yes Historical Provider   linagliptin (TRADJENTA) 5 mg tablet Take 5 mg by mouth daily. Yes Historical Provider   traZODone (DESYREL) 100 mg tablet Take 200 mg by mouth nightly. Yes Historical Provider   cholecalciferol, VITAMIN D3, (VITAMIN D3) 5,000 unit tab tablet Take 5,000 Units by mouth daily (with lunch). Yes Historical Provider   clindamycin (CLEOCIN) 300 mg capsule Take 600 mg by mouth once as needed (Prior to dental procedures).    Yes Historical Provider   pregabalin (LYRICA) 200 mg capsule Take 400 mg by mouth nightly. Yes Historical Provider   pramipexole (MIRAPEX) 0.125 mg tablet Take 0.125 mg by mouth nightly. Yes Historical Provider   amLODIPine (NORVASC) 5 mg tablet Take 5 mg by mouth daily. Yes Historical Provider   finasteride (PROSCAR) 5 mg tablet Take 5 mg by mouth daily. Yes Janis Price MD   simvastatin (ZOCOR) 20 mg tablet Take 20 mg by mouth nightly. Yes Historical Provider   digoxin (LANOXIN) 0.125 mg tablet Take 0.125 mg by mouth four (4) days a week. Daily in the AM and in the evenings on Mon, Wed, Thurs, Sat (see additional order)   Yes Historical Provider   tamsulosin (FLOMAX) 0.4 mg capsule Take 0.4 mg by mouth nightly. Yes Historical Provider   omeprazole (PRILOSEC) 20 mg capsule Take 20 mg by mouth Daily (before breakfast).    Yes Historical Provider       Past Medical History:   Diagnosis Date    Acute confusion 9/29/2012    Atrial fibrillation ([facility] [address]     CHRONIC    Borderline diabetes mellitus     CKD (chronic kidney disease)     Diet-controlled type 2 diabetes mellitus ([address]     Dyslipidemia     Hypertension     Hypertensive cardiovascular disease     Meniere disease     Mitral regurgitation     echo 3-27-07    Neurological disorder     dizziness, tilt table test 5-4-07 unremarkable    NPH (normal pressure hydrocephalus)     Shunt [facility]     Orthostatic hypotension     Other ill-defined conditions(799.89)     planter facia,2 achilles    Peripheral neuropathy     Stroke ([facility] [address] [facility]     4 TIA    TIA (transient ischemic attack)         Past Surgical History:   Procedure Laterality Date    HX APPENDECTOMY      HX CHOLECYSTECTOMY      HX HERNIA REPAIR      HX ORTHOPAEDIC      Left tkr,,rotator cuff    HX TURP          Social History   Substance Use Topics    Smoking status: Never Smoker    Smokeless tobacco: Never Used    Alcohol use Yes      Comment: weekly        Family History   Problem Relation Age of Onset    Heart Disease Father         Review of Systems: Comprehensive review of systems performed and negative except for as listed above. Exam:     Visit Vitals    /58 (BP 1 Location: Right arm, BP Patient Position: Sitting;Post activity)    Pulse 95    Temp 98.6 °F (37 °C)    Resp 21    Ht 6' 1\" (1.854 m)    Wt 78.2 kg (172 lb 4.8 oz)    SpO2 95%    BMI 22.73 kg/m2        General: Well developed, well nourished. Patient in no apparent distress   Head: Normocephalic, atraumatic, anicteric sclera   Lungs:  Clear to auscultation bilaterally, No wheezes or rubs   Cardiac: Irregular rate and rhythm with no murmurs. Abd: Bowel sounds were audible. No tenderness on palpation   Ext: No pedal edema   Skin: No overt signs of rash     Neurological Exam:  Mental Status: Alert and oriented to person place and time   Speech: Fluent no aphasia or dysarthria. Cranial Nerves:   Intact visual fields. Facial sensation is normal. Facial movement is symmetric. Palate is midline. Normal sternocleidomastoid strength. Tongue is midline. Hearing is intact bilaterally. Eyes: PERRL, EOM's full, no nystagmus, no ptosis. Motor:  Full and symmetric strength of upper and lower proximal and distal muscles. Normal bulk and tone. Reflexes:   Deep tendon reflexes 1+/4 and symmetrical.  Plantar response is downgoing b/l. Sensory:   Symmetrically intact  with no perceived deficits modalities involving small or large fibers. Gait:  Gait is deferred-PT waiting to assess   Tremor:   Mild b/l action tremor UE    Cerebellar:  Finger to nose and heel over shin to knee was demonstrated competently. Neurovascular: No carotid bruits. Imaging  CT Results (maximum last 3): Results from [place] encounter on 11/29/17   CT HEAD WO CONT   Narrative INDICATION: multiple falls, lethargy     Exam: Noncontrast CT of the brain is performed with 5 mm collimation.     CT dose reduction was achieved with the use of the standardized protocol  tailored for this examination and automatic exposure control for dose  modulation. Adaptive statistical iterative reconstruction (ASIR) was utilized. FINDINGS: There is age-appropriate diffuse cortical atrophy. Ventriculostomy  shunt catheter is unchanged in position. Ventricular system is stable. There is  no acute intracranial hemorrhage, mass, mass effect or herniation. The  gray-white matter differentiation is well-preserved. The mastoid air cells are  well pneumatized. The visualized paranasal sinuses are normal.         Impression IMPRESSION: No acute intracranial hemorrhage, mass or infarct. Interval change. MRI Results (maximum last 3): Results from [place] encounter on 11/29/17   MRI BRAIN WO CONT   Narrative INDICATION:   falls     EXAMINATION:  MRI BRAIN WO CONTRAST    COMPARISON:  February 17, 2011 MRI and CT 11/29/2017    TECHNIQUE:  MR imaging of the brain was performed with sagittal T1, axial T1,  T2, FLAIR, GRE, DWI/ADC, coronal T2.    FINDINGS:      Ventricles:  Prominence of the ventricles, sulci and cisterns related to  underlying atrophy. Right frontal ventriculostomy catheter again evident,  better visualized on CT scan yesterday. No hydrocephalus or midline shift  Intracranial Hemorrhage:  None. Brain Parenchyma/Brainstem:  Mild patchy chronic white matter disease in the  supratentorial brain. No acute infarction. Basal Cisterns:  Normal.   Flow Voids:  Normal.  Additional Comments:  Susceptibility artifact related to indwelling right  ventriculostomy catheter. Impression IMPRESSION:  Atrophy and chronic white matter disease with no acute infarction. Right  frontal ventriculostomy catheter. No hydrocephalus.         Lab Review  Lab Results   Component Value Date/Time    WBC 9.8 11/30/2017 03:37 AM    HCT 41.9 11/30/2017 03:37 AM    HGB 13.5 11/30/2017 03:37 AM    PLATELET 107 76/46/4101 03:37 AM     Lab Results   Component Value Date/Time    Sodium 139 11/30/2017 03:37 AM Potassium 4.2 11/30/2017 03:37 AM    Chloride 106 11/30/2017 03:37 AM    CO2 24 11/30/2017 03:37 AM    Glucose 95 11/30/2017 03:37 AM    BUN 36 11/30/2017 03:37 AM    Creatinine 1.80 11/30/2017 03:37 AM    Calcium 9.4 11/30/2017 03:37 AM     No components found for: TROPQUANT  No results found for: OPAL    Signed:  Brendan Marvin DO  11/30/2017  2:41 PM

## 2017-11-30 NOTE — PROGRESS NOTES
Hospitalist Progress Note  Satya Jameson MD  Answering service: [phone] OR [phone] from in house phone      Date of Service:  2017  NAME:  Noe Orellana  :  1923  MRN:  [id number]      Admission Summary:   80year-old gentleman with past medical history of atrial fibrillation on Xarelto,   history of NPH with a  shunt, history of1 abnormal EKG,   hypertension, peripheral neuropathy, borderline diabetes, dyslipidemia,   Meniere disease, sensorineural hearing loss, who presents to the   hospital with falls at home    Interval history / Subjective:     Patient seen in room with family at bedside, he is about to go for an mri. He denies any compliant at present, Feels Deconditioned. Assessment & Plan:     Hospital Problems  Date Reviewed: 2017          Codes Class Noted POA    * (Principal)Pneumonia ICD-10-CM: J18.9  ICD-9-CM: 073  2017 Unknown            Pneumonia   Sepsis from Pneumonia on admission _ Continue abx for pneumonia  Follow Up Cultures  Positive urinalysis- R/o UTI - Follow up Cultures    Frequent falls  Check MRI brain - R/o Stroke  Check orthostatics, AM Cortisol and TSH    Atrial Fibrilaltion _ paroxysmal  Not on A/C due to risk of falls - Follow up MRI as above    Dm2 with Hyperglycemia  Continue SSI     Elevated creatinine with underlying CKD2  Hydrate and avoid nephrotoxins        Code status: Fill  DVT prophylaxis: heparin    Care Plan discussed with: Patient/Family  Disposition: TBD         Review of Systems:   A comprehensive review of systems was negative except for that written in the HPI. Vital Signs:    Last 24hrs VS reviewed since prior progress note.  Most recent are:  Visit Vitals    /79 (BP 1 Location: Left arm, BP Patient Position: At rest)    Pulse (!) 114    Temp (!) 101.2 °F (38.4 °C)    Resp 21    Ht 6' 1\" (1.854 m)    Wt 78.2 kg (172 lb 4.8 oz)    SpO2 98%  BMI 22.73 kg/m2         Intake/Output Summary (Last 24 hours) at 11/30/17 1008  Last data filed at 11/30/17 0840   Gross per 24 hour   Intake              480 ml   Output              200 ml   Net              280 ml        Physical Examination:             Constitutional:  No acute distress, cooperative, pleasant    ENT:  Oral mucous moist, oropharynx benign. Neck supple,    Resp:  CTA bilaterally. No wheezing/rhonchi/rales. No accessory muscle use   CV:  Regular rhythm, normal rate, no murmurs, gallops, rubs    GI:  Soft, non distended, non tender. normoactive bowel sounds, no hepatosplenomegaly     Musculoskeletal:  No edema, warm, 2+ pulses throughout    Neurologic:  Moves all extremities. AAOx3, CN II-XII reviewed     Psych:  Good insight, Not anxious nor agitated. Data Review:    Review and/or order of clinical lab test  Review and/or order of tests in the radiology section of CPT  Decision to obtain old records and/or obtain history from someone other than the patient      Labs:     Recent Labs      11/30/17 0337 11/29/17   1337   WBC  9.8  14.6*   HGB  13.5  14.0   HCT  41.9  42.8   PLT  133*  136*     Recent Labs      11/30/17 0337 11/29/17   1337   NA  139  138   K  4.2  4.5   CL  106  107   CO2  24  24   BUN  36*  35*   CREA  1.80*  1.96*   GLU  95  202*   CA  9.4  10.1     Recent Labs      11/29/17   1337   SGOT  22   ALT  17   AP  98   TBILI  1.3*   TP  8.2   ALB  3.0*   GLOB  5.2*     No results for input(s): INR, PTP, APTT in the last 72 hours. No lab exists for component: INREXT   No results for input(s): FE, TIBC, PSAT, FERR in the last 72 hours. No results found for: FOL, RBCF   No results for input(s): PH, PCO2, PO2 in the last 72 hours.   Recent Labs      11/30/17 0337 11/29/17   1337   CPK  333*  334*   TROIQ  <0.04   --      Lab Results   Component Value Date/Time    Cholesterol, total 157 02/17/2011 05:22 AM    HDL Cholesterol 60 02/17/2011 05:22 AM    LDL, calculated 68.4 02/17/2011 05:22 AM    Triglyceride 143 02/17/2011 05:22 AM    CHOL/HDL Ratio 2.6 02/17/2011 05:22 AM     Lab Results   Component Value Date/Time    Glucose (POC) 117 11/30/2017 05:56 AM    Glucose (POC) 99 11/30/2017 12:53 AM    Glucose (POC) 198 11/29/2017 06:56 PM    Glucose (POC) 129 03/06/2011 06:29 AM    Glucose (POC) 161 03/05/2011 10:05 PM     Lab Results   Component Value Date/Time    Color DARK YELLOW 11/29/2017 11:58 PM    Appearance CLOUDY 11/29/2017 11:58 PM    Specific gravity 1.017 11/29/2017 11:58 PM    Specific gravity >1.030 04/04/2010 04:00 PM    pH (UA) 5.5 11/29/2017 11:58 PM    Protein 100 11/29/2017 11:58 PM    Glucose NEGATIVE  11/29/2017 11:58 PM    Ketone NEGATIVE  11/29/2017 11:58 PM    Bilirubin NEGATIVE  11/29/2017 11:58 PM    Urobilinogen 1.0 11/29/2017 11:58 PM    Nitrites POSITIVE 11/29/2017 11:58 PM    Leukocyte Esterase LARGE 11/29/2017 11:58 PM    Epithelial cells FEW 11/29/2017 11:58 PM    Bacteria 2+ 11/29/2017 11:58 PM    WBC >100 11/29/2017 11:58 PM    RBC 10-20 11/29/2017 11:58 PM         Medications Reviewed:     Current Facility-Administered Medications   Medication Dose Route Frequency    digoxin (LANOXIN) tablet 0.125 mg  0.125 mg Oral Once per day on Mon Wed Thu Sat    DULoxetine (CYMBALTA) capsule 30 mg  30 mg Oral QHS    finasteride (PROSCAR) tablet 5 mg  5 mg Oral DAILY    glipiZIDE SR (GLUCOTROL XL) tablet 2.5 mg  2.5 mg Oral DAILY    levothyroxine (SYNTHROID) tablet 100 mcg  100 mcg Oral ACB    SITagliptin (JANUVIA) tablet tab 25 mg  25 mg Oral DAILY    pantoprazole (PROTONIX) tablet 40 mg  40 mg Oral ACB    pramipexole (MIRAPEX) tablet 0.125 mg  0.125 mg Oral QHS    pregabalin (LYRICA) capsule 75 mg  75 mg Oral QHS    famotidine (PEPCID) tablet 20 mg  20 mg Oral QHS    simethicone (MYLICON) tablet 80 mg  80 mg Oral TID    simvastatin (ZOCOR) tablet 20 mg  20 mg Oral QHS    tamsulosin (FLOMAX) capsule 0.4 mg  0.4 mg Oral QHS    sodium chloride (NS) flush 5-10 mL  5-10 mL IntraVENous Q8H    sodium chloride (NS) flush 5-10 mL  5-10 mL IntraVENous PRN    0.9% sodium chloride infusion  75 mL/hr IntraVENous CONTINUOUS    [START ON 12/1/2017] levoFLOXacin (LEVAQUIN) 750 mg in D5W IVPB  750 mg IntraVENous Q48H    acetaminophen (TYLENOL) tablet 650 mg  650 mg Oral Q4H PRN    albuterol (PROVENTIL VENTOLIN) nebulizer solution 2.5 mg  2.5 mg Nebulization Q4H RT    glucose chewable tablet 16 g  4 Tab Oral PRN    dextrose (D50W) injection syrg 12.5-25 g  12.5-25 g IntraVENous PRN    glucagon (GLUCAGEN) injection 1 mg  1 mg IntraMUSCular PRN    insulin lispro (HUMALOG) injection   SubCUTAneous Q6H    amLODIPine (NORVASC) tablet 5 mg  5 mg Oral DAILY    digoxin (LANOXIN) tablet 0.125 mg  0.125 mg Oral DAILY     ______________________________________________________________________  EXPECTED LENGTH OF STAY: - - -  ACTUAL LENGTH OF STAY:          1                 Yi Gonzalez MD

## 2017-11-30 NOTE — PROGRESS NOTES
0900-pt 's fib-am med given -digoxin- monitored pt for safety/ fall -pt shaky and impulsive  1200- hr down to 90's   1400- pt OOB to chair with 2 assist,family at bedside  1930- Bedside shift change report given to [address] [place] [address] (oncoming nurse) by JOHN Pacheco (offgoing nurse). Report included the following information SBAR, Kardex, MAR and Recent Results.

## 2017-11-30 NOTE — PROGRESS NOTES
Problem: Mobility Impaired (Adult and Pediatric)  Goal: *Acute Goals and Plan of Care (Insert Text)  Physical Therapy Goals  Initiated 11/30/2017  1. Patient will move from supine to sit and sit to supine , scoot up and down and roll side to side in bed with independence within 7 day(s). 2.  Patient will transfer from bed to chair and chair to bed with modified independence using the least restrictive device within 7 day(s). 3.  Patient will perform sit to stand with modified independence within 7 day(s). 4.  Patient will ambulate with modified independence for 300 feet with the least restrictive device within 7 day(s). physical Therapy EVALUATION  Patient: Cecilia Dial (41 y.o. male)  Date: 11/30/2017  Primary Diagnosis: Pneumonia        Precautions:   Fall, Bed Alarm    ASSESSMENT :  Based on the objective data described below, the patient presents with generalized weakness, impaired standing balance, some gait instability and decline in functional mobility from his baseline which is mod I using a rollator. Used a rolling walker for eval and pt had some difficulty with its management, keeping it out to far in front of him and bumping into objects on his left. Pt has undergone both a CT and MRI of head and both were negative for acute process. Pt was able to amb 40 feet with the rolling walker with min assist (and assist of another for equipment management) and remain up to the chair post session. Post amb his BP did drop and then systolic recovered and diastolic dropped, pt not symptomatic, discussed with his nurse. Refer to doc flow from 1345 to 1416 for all entries. At this point I recommend that pt be discharged to the health care portion of [facility] for rehab. .     Patient will benefit from skilled intervention to address the above impairments.   Patients rehabilitation potential is considered to be Good  Factors which may influence rehabilitation potential include:   [] None noted  []         Mental ability/status  [x]         Medical condition  []         Home/family situation and support systems  []         Safety awareness  []         Pain tolerance/management  [x]         Other: independent living      PLAN :  Recommendations and Planned Interventions:  [x]           Bed Mobility Training             []    Neuromuscular Re-Education  [x]           Transfer Training                   []    Orthotic/Prosthetic Training  [x]           Gait Training                         []    Modalities  [x]           Therapeutic Exercises           []    Edema Management/Control  [x]           Therapeutic Activities            [x]    Patient and Family Training/Education  []           Other (comment):    Frequency/Duration: Patient will be followed by physical therapy  5 times a week to address goals. Discharge Recommendations: health care at [address] [place] [address] Srini Fairchild Recommendations for Discharge: none anticipated      SUBJECTIVE:   Patient stated I'm ok.     OBJECTIVE DATA SUMMARY:   Consult received, chart reviewed, pt cleared by nursing  HISTORY:    Past Medical History:   Diagnosis Date    Acute confusion 9/29/2012    Atrial fibrillation ([address]     CHRONIC    Borderline diabetes mellitus     CKD (chronic kidney disease)     Diet-controlled type 2 diabetes mellitus ([address]     Dyslipidemia     Hypertension     Hypertensive cardiovascular disease     Meniere disease     Mitral regurgitation     echo 3-27-07    Neurological disorder     dizziness, tilt table test 5-4-07 unremarkable    NPH (normal pressure hydrocephalus)     Shunt Dr Eleaazr Martinez MCV     Orthostatic hypotension     Other ill-defined conditions(799.89)     planter facia,2 achilles    Peripheral neuropathy     Stroke ([address]     4 TIA    TIA (transient ischemic attack)      Past Surgical History:   Procedure Laterality Date    HX APPENDECTOMY      HX CHOLECYSTECTOMY      HX HERNIA REPAIR      HX ORTHOPAEDIC      Left tkr,,rotator cuff    HX TURP       Prior Level of Function/Home Situation: mod I using a rollator, admitted after a fall  Personal factors and/or comorbidities impacting plan of care: falls, previous CVAs, a fib, lives in independent living    [address]: [place] [address] Name: Diane Shay  # Steps to Enter: 0  Living Alone: Yes  Support Systems: Child(theo) (staff)  Current DME Used/Available at Home: Cane, quad, Grab bars, Walker, rollator    EXAMINATION/PRESENTATION/DECISION MAKING:   Critical Behavior:  Neurologic State: Alert  Orientation Level: Oriented X4  Cognition: Follows commands, Poor safety awareness, Impulsive     Hearing: Auditory  Auditory Impairment:right ear deaf, decreased hearing left ear and uses hearing aide on left  Skin:  Refer to MD and nursing noted  Edema: none noted  Range Of Motion:  AROM: Generally decreased, functional                       Strength:    Strength: Generally decreased, functional                    Tone & Sensation:                  Sensation: Impaired (at baseline especially left foot) has neuropathy                Coordination:     Vision:    wear glasses to read  Functional Mobility:  Bed Mobility:     Supine to Sit: Stand-by asssistance        Transfers:  Sit to Stand: Minimum assistance  Stand to Sit: Minimum assistance                       Balance:   Sitting: Intact; Without support  Standing: Impaired  Standing - Static: Constant support;Good  Standing - Dynamic : Fair  Ambulation/Gait Training:  Distance (ft): 40 Feet (ft)  Assistive Device: Gait belt;Walker, rolling  Ambulation - Level of Assistance: Moderate assistance        Gait Abnormalities: Decreased step clearance; Path deviations        Base of Support: Widened     Speed/Christina: Slow  Step Length: Left shortened;Right shortened                     Stairs: Therapeutic Exercises:    Ankle pumps    Functional Measure:  Timed up and go:    Timed Get Up And Go Test: 0 (pt unable)     Timed Up and Go and G-code impairment scale:  Percentage of Impairment CH    0%   CI    1-19% CJ    20-39% CK    40-59% CL    60-79% CM    80-99% CN     100%   Timed   Score 0-56 10 11-12 13-14 15-16 17-18 19 20       < than 10 seconds=Normal  Greater then 13.5 seconds (in elderly)=Increased fall risk   Matthew MANRIQUE, Shawna Alexander. Predicting the probability for falls in community dwelling older adults using the Timed Up and Go Test. Phys Ther. 2000;80:896-903. G codes: In compliance with CMSs Claims Based Outcome Reporting, the following G-code set was chosen for this patient based on their primary functional limitation being treated: The outcome measure chosen to determine the severity of the functional limitation was the TUG with a score of 0 which was correlated with the impairment scale. ? Mobility - Walking and Moving Around:     - CURRENT STATUS: CN - 100% impaired, limited or restricted    - GOAL STATUS: CJ - 20%-39% impaired, limited or restricted    - D/C STATUS:  ---------------To be determined---------------      Physical Therapy Evaluation Charge Determination   History Examination Presentation Decision-Making   HIGH Complexity :3+ comorbidities / personal factors will impact the outcome/ POC  LOW Complexity : 1-2 Standardized tests and measures addressing body structure, function, activity limitation and / or participation in recreation  LOW Complexity : Stable, uncomplicated  LOW Complexity : FOTO score of       Based on the above components, the patient evaluation is determined to be of the following complexity level: LOW     Pain:  Pain Scale 1: Numeric (0 - 10)  Pain Intensity 1: 0              Activity Tolerance:   Refer to assessment above  Please refer to the flowsheet for vital signs taken during this treatment.   After treatment:   [x]         Patient left in no apparent distress sitting up in chair on an activated alarm pad  []         Patient left in no apparent distress in bed  [x]         Call bell left within reach  [x]         Nursing notified  [x]         Caregiver present  []         Bed alarm activated    COMMUNICATION/EDUCATION:   The patients plan of care was discussed with: Registered Nurse. [x]         Fall prevention education was provided and the patient/caregiver indicated understanding. [x]         Patient/family have participated as able in goal setting and plan of care. [x]         Patient/family agree to work toward stated goals and plan of care. []         Patient understands intent and goals of therapy, but is neutral about his/her participation. []         Patient is unable to participate in goal setting and plan of care.     Thank you for this referral.  Leafy Newness   Time Calculation: 31 mins

## 2017-11-30 NOTE — PROGRESS NOTES
Day #1 of Levaquin  Indication:  CAP  Current regimen:  750 mg IV q24h  Abx regimen: levaquin  Recent Labs      17   1337   WBC  14.6*   CREA  1.96*   BUN  35*     Est CrCl: 25.9 ml/min; UO: n/a ml/kg/hr  Temp (24hrs), Av °F (37.2 °C), Min:97.8 °F (36.6 °C), Max:100.1 °F (37.8 °C)    Cultures: none    Plan: Change to 750 mg IV q48h for CrCl  20-49 ml/min.

## 2017-11-30 NOTE — CDMP QUERY
Dr. Dodie Martinez,     Please clarify if this patient is being treated/managed for:    =>Severe sepsis in the setting of pneumonia, fever, tachycardia, and acute renal failure requiring IV abxs  =>Other Explanation of clinical findings  =>Unable to Determine (no explanation of clinical findings)    The medical record reflects the following clinical findings, treatment, and risk factors:    Risk Factors: 79 y/o pt  Clinical Indicators: fever, elevated wbcs, tachycardia, elevated lactic acid, acute renal failure  Treatment: IVFs, IV abxs    Please clarify and document your clinical opinion in the progress notes and discharge summary including the definitive and/or presumptive diagnosis, (suspected or probable), related to the above clinical findings. Please include clinical findings supporting your diagnosis.     Thank you,   Ellie Bonds, [facility]

## 2017-11-30 NOTE — PROGRESS NOTES
Sent referral to [facility] via in[employer]Depth to confirm what level of care patient lived in prior to admission. He will probably need their Healthcare unit at discharge.

## 2017-12-01 NOTE — PROGRESS NOTES
Spoke with BRENDEN Conroy [facility], who confirmed patient is from independent living there. She stated if patient comes back to Healthcare Unit over weekend, please call the RN Supervisor at [phone] before sending patient. Sent updates to [facility] via [employer].

## 2017-12-01 NOTE — PROGRESS NOTES
Hospitalist Progress Note  Jerica Centeno MD  Answering service: [phone] OR [phone] from in house phone      Date of Service:  2017  NAME:  Cecilia Dial  :  1923  MRN:  [id number]      Admission Summary:   80year-old gentleman with past medical history of atrial fibrillation on Xarelto,   history of NPH with a  shunt, history of1 abnormal EKG,   hypertension, peripheral neuropathy, borderline diabetes, dyslipidemia,   Meniere disease, sensorineural hearing loss, who presents to the   hospital with falls at home    Interval history / Subjective:     Patient seen in room with family at bedside, he had Fevers today. He appears to be slightly Impulsive. He denies any complaints.       Assessment & Plan:     Hospital Problems  Date Reviewed: 2017          Codes Class Noted POA    * (Principal)Pneumonia ICD-10-CM: J18.9  ICD-9-CM: 505  2017 Unknown            Pneumonia   Patient having recurrent Fevers  Check blood Cultures Today  Add Cefepime to cover for gram negative pneumonia  Continue levaquin  Sepsis from Pneumonia on admission  Positive urinalysis- R/o UTI - Follow up Cultures    Dyspnea  Cxr checked and appears to have vascular congestion  Check Echo  One Time Dose lasix Today    Frequent falls  Check MRI brain - negative for stroke  Check orthostatics, AM Cortisol and TSH    Mild Cognitive impairment  Having some episodes of sundowning  Metabolic encephalopathy from sepsis  Appreciate Neuro Eval     Atrial Fibrilaltion _ paroxysmal  Not on A/C due to risk of falls  MRI negative for stroke    Dm2 with Hyperglycemia  Continue SSI     Elevated creatinine with underlying CKD2  Hydrate and avoid nephrotoxins    Code status: Fill  DVT prophylaxis: heparin    Care Plan discussed with: Patient/Family  Disposition: TBD         Review of Systems:   A comprehensive review of systems was negative except for that written in the HPI. Vital Signs:    Last 24hrs VS reviewed since prior progress note. Most recent are:  Visit Vitals    /68 (BP 1 Location: Right arm, BP Patient Position: At rest)    Pulse (!) 104    Temp (!) 101 °F (38.3 °C)    Resp 22    Ht 6' 1\" (1.854 m)    Wt 78.2 kg (172 lb 4.8 oz)    SpO2 92%    BMI 22.73 kg/m2         Intake/Output Summary (Last 24 hours) at 12/01/17 1325  Last data filed at 12/01/17 1151   Gross per 24 hour   Intake           2632.5 ml   Output             2380 ml   Net            252.5 ml        Physical Examination:             Constitutional:  No acute distress, cooperative, pleasant    ENT:  Oral mucous moist, oropharynx benign. Neck supple,    Resp:  CTA bilaterally. No wheezing/rhonchi/rales. No accessory muscle use   CV:  Regular rhythm, normal rate, no murmurs, gallops, rubs    GI:  Soft, non distended, non tender. normoactive bowel sounds, no hepatosplenomegaly     Musculoskeletal:  No edema, warm, 2+ pulses throughout    Neurologic:  Moves all extremities. AAOx3, CN II-XII reviewed     Psych:  Good insight, Not anxious nor agitated. Data Review:    Review and/or order of clinical lab test  Review and/or order of tests in the radiology section of CPT  Decision to obtain old records and/or obtain history from someone other than the patient      Labs:     Recent Labs      12/01/17   0403  11/30/17   0337   WBC  5.7  9.8   HGB  12.0*  13.5   HCT  36.3*  41.9   PLT  116*  133*     Recent Labs      12/01/17   0403  11/30/17   0337  11/29/17   1337   NA  138  139  138   K  3.8  4.2  4.5   CL  108  106  107   CO2  23  24  24   BUN  36*  36*  35*   CREA  1.74*  1.80*  1.96*   GLU  125*  95  202*   CA  8.9  9.4  10.1     Recent Labs      11/29/17   1337   SGOT  22   ALT  17   AP  98   TBILI  1.3*   TP  8.2   ALB  3.0*   GLOB  5.2*     No results for input(s): INR, PTP, APTT in the last 72 hours.     No lab exists for component: INREXT, INREXT   No results for input(s): FE, TIBC, PSAT, FERR in the last 72 hours. No results found for: FOL, RBCF   No results for input(s): PH, PCO2, PO2 in the last 72 hours.   Recent Labs      11/30/17   0337  11/29/17   1337   CPK  333*  334*   TROIQ  <0.04   --      Lab Results   Component Value Date/Time    Cholesterol, total 157 02/17/2011 05:22 AM    HDL Cholesterol 60 02/17/2011 05:22 AM    LDL, calculated 68.4 02/17/2011 05:22 AM    Triglyceride 143 02/17/2011 05:22 AM    CHOL/HDL Ratio 2.6 02/17/2011 05:22 AM     Lab Results   Component Value Date/Time    Glucose (POC) 179 12/01/2017 12:01 PM    Glucose (POC) 109 12/01/2017 07:15 AM    Glucose (POC) 164 11/30/2017 11:07 PM    Glucose (POC) 176 11/30/2017 06:00 PM    Glucose (POC) 144 11/30/2017 12:21 PM     Lab Results   Component Value Date/Time    Color DARK YELLOW 11/29/2017 11:58 PM    Appearance CLOUDY 11/29/2017 11:58 PM    Specific gravity 1.017 11/29/2017 11:58 PM    Specific gravity >1.030 04/04/2010 04:00 PM    pH (UA) 5.5 11/29/2017 11:58 PM    Protein 100 11/29/2017 11:58 PM    Glucose NEGATIVE  11/29/2017 11:58 PM    Ketone NEGATIVE  11/29/2017 11:58 PM    Bilirubin NEGATIVE  11/29/2017 11:58 PM    Urobilinogen 1.0 11/29/2017 11:58 PM    Nitrites POSITIVE 11/29/2017 11:58 PM    Leukocyte Esterase LARGE 11/29/2017 11:58 PM    Epithelial cells FEW 11/29/2017 11:58 PM    Bacteria 2+ 11/29/2017 11:58 PM    WBC >100 11/29/2017 11:58 PM    RBC 10-20 11/29/2017 11:58 PM         Medications Reviewed:     Current Facility-Administered Medications   Medication Dose Route Frequency    cefepime (MAXIPIME) injection 1 g  1 g IntraVENous Q12H    furosemide (LASIX) injection 40 mg  40 mg IntraVENous ONCE    dilTIAZem (CARDIZEM) IR tablet 30 mg  30 mg Oral Q6H    acetaminophen (TYLENOL) tablet 650 mg  650 mg Oral Q4H PRN    aspirin delayed-release tablet 81 mg  81 mg Oral DAILY    heparin (porcine) injection 5,000 Units  5,000 Units SubCUTAneous Q12H    digoxin (LANOXIN) tablet 0.125 mg 0.125 mg Oral Once per day on Mon Wed Thu Sat    DULoxetine (CYMBALTA) capsule 30 mg  30 mg Oral QHS    finasteride (PROSCAR) tablet 5 mg  5 mg Oral DAILY    glipiZIDE SR (GLUCOTROL XL) tablet 2.5 mg  2.5 mg Oral DAILY    levothyroxine (SYNTHROID) tablet 100 mcg  100 mcg Oral ACB    SITagliptin (JANUVIA) tablet tab 25 mg  25 mg Oral DAILY    pantoprazole (PROTONIX) tablet 40 mg  40 mg Oral ACB    pramipexole (MIRAPEX) tablet 0.125 mg  0.125 mg Oral QHS    pregabalin (LYRICA) capsule 75 mg  75 mg Oral QHS    famotidine (PEPCID) tablet 20 mg  20 mg Oral QHS    simethicone (MYLICON) tablet 80 mg  80 mg Oral TID    simvastatin (ZOCOR) tablet 20 mg  20 mg Oral QHS    tamsulosin (FLOMAX) capsule 0.4 mg  0.4 mg Oral QHS    sodium chloride (NS) flush 5-10 mL  5-10 mL IntraVENous Q8H    sodium chloride (NS) flush 5-10 mL  5-10 mL IntraVENous PRN    0.9% sodium chloride infusion  75 mL/hr IntraVENous CONTINUOUS    levoFLOXacin (LEVAQUIN) 750 mg in D5W IVPB  750 mg IntraVENous Q48H    albuterol (PROVENTIL VENTOLIN) nebulizer solution 2.5 mg  2.5 mg Nebulization Q4H RT    glucose chewable tablet 16 g  4 Tab Oral PRN    dextrose (D50W) injection syrg 12.5-25 g  12.5-25 g IntraVENous PRN    glucagon (GLUCAGEN) injection 1 mg  1 mg IntraMUSCular PRN    insulin lispro (HUMALOG) injection   SubCUTAneous Q6H    digoxin (LANOXIN) tablet 0.125 mg  0.125 mg Oral DAILY     ______________________________________________________________________  EXPECTED LENGTH OF STAY: - - -  ACTUAL LENGTH OF STAY:          2                 Long Basurto MD

## 2017-12-01 NOTE — PROGRESS NOTES
Problem: Falls - Risk of  Goal: *Absence of Falls  Document Nancy Fall Risk and appropriate interventions in the flowsheet. Outcome: Progressing Towards Goal  Fall Risk Interventions:  Mobility Interventions: Bed/chair exit alarm, Communicate number of staff needed for ambulation/transfer    Mentation Interventions: Evaluate medications/consider consulting pharmacy    Medication Interventions: Evaluate medications/consider consulting pharmacy, Teach patient to arise slowly, Patient to call before getting OOB    Elimination Interventions: Call light in reach, Toileting schedule/hourly rounds    History of Falls Interventions: Door open when patient unattended, Investigate reason for fall, Room close to nurse's station    Bed in low position. Locked bed wheels. Call bell and personal items within reach. Side rails up x3. Hourly rounding performed.

## 2017-12-01 NOTE — PROGRESS NOTES
Spiritual Care Partner Volunteer visited patient in room 409/01on 12.01.17. Documented by: : Rev. Roberta De Jesus.  Hardeep Query; [facility], to contact [phone] [address] call: [phone]

## 2017-12-01 NOTE — PROGRESS NOTES
Day #1 of Cefepime  Indication:  PNA  Current regimen:  1 gram IV Q 12 hours  Abx regimen: Cefepime + Levaquin  Recent Labs      17   0403  17   0337  17   1337   WBC  5.7  9.8  14.6*   CREA  1.74*  1.80*  1.96*   BUN  36*  36*  35*     Est CrCl: 28.7 ml/min  Temp (24hrs), Av.9 °F (37.7 °C), Min:98.2 °F (36.8 °C), Max:102.6 °F (39.2 °C)    Cultures:  Urine- 1000 CoNs- final   Blood-NGTD, pending    Plan: Change to 2 grams IV Q 24 hours per [facility] [place] P&T Committee Protocol with respect to renal function and indication. Pharmacy will continue to monitor patient daily and will make dosage adjustments based upon changing renal function.     Carole Greer, PoojaD, BCPS

## 2017-12-01 NOTE — PROGRESS NOTES
Patient seen during rounds. Patient  lives at [place] [address] and anticipate discharge to healthcare when medically stable. PT noted reviewed. Patient uses a walker at home to ambulate. Patient's wife recently . Son Bhargavi Gonzalez is next of kin [phone]. CM will continue to follow and assist with her disposition. Care Management Interventions  PCP Verified by CM:  Yes (Dr Edith Lal)  Palliative Care Criteria Met (RRAT>21 & CHF Dx)?: No  Mode of Transport at Discharge:  (TBD)  Transition of Care Consult (CM Consult): Discharge Planning (Lives at [facility] )  Physical Therapy Consult: Yes  Confirm Follow Up Transport:  (TBD)  Plan discussed with Pt/Family/Caregiver: Yes  Freedom of Choice Offered: Yes  Discharge Location  Discharge Placement:  (Discharge to [facility])     Pinky Rodas RN CRM

## 2017-12-02 NOTE — PROGRESS NOTES
Problem: Pressure Injury - Risk of  Goal: *Prevention of pressure ulcer  Outcome: Progressing Towards Goal  Encourage Q2 turning schedule. Keeping linens dry. Moisture barrier to protect from incontinence. Will continue to monitor skin.

## 2017-12-02 NOTE — PROGRESS NOTES
TRANSFER - IN REPORT:    Verbal report received from [employer] (name) on Daxa Salgado  being received from [facility] (unit) for routine progression of care      Report consisted of patients Situation, Background, Assessment and   Recommendations(SBAR). Information from the following report(s) SBAR, Kardex, Intake/Output, MAR and Recent Results was reviewed with the receiving nurse. Opportunity for questions and clarification was provided. Assessment completed upon patients arrival to unit and care assumed.

## 2017-12-02 NOTE — PROGRESS NOTES
Hospitalist Progress Note  Melvin Carter MD  Answering service: [phone] OR [phone] from in house phone      Date of Service:  2017  NAME:  Steve Montano  :  1923  MRN:  [id number]      Admission Summary:   80year-old gentleman with past medical history of atrial fibrillation on Xarelto,   history of NPH with a  shunt, history of1 abnormal EKG,   hypertension, peripheral neuropathy, borderline diabetes, dyslipidemia,   Meniere disease, sensorineural hearing loss, who presents to the   hospital with falls at home    Interval history / Subjective:     Patient seen in room with family at bedside, he had no fevers overnight. Assessment & Plan:     Hospital Problems  Date Reviewed: 2017          Codes Class Noted POA    * (Principal)Pneumonia ICD-10-CM: J18.9  ICD-9-CM: 793  2017 Unknown            Pneumonia   Sepsis in the setting of pneumonia/UTI with Fevers and Tachycardia and lactic acidosis  Recheck Cultures done   Added Cefepime  to cover for gram negative pneumonia  Continue levaquin  Sepsis from Pneumonia on admission  Positive urinalysis- R/o UTI - Follow up Cultures    Dyspnea  Acute on chronic Diastolic heart failure  Echo - Left ventricle: Systolic function was normal. Ejection fraction was  estimated in the range of 55 % to 60 %. There were no regional wall motion  Abnormalities.     Frequent falls  MRI brain - negative for stroke  Check orthostatics, AM Cortisol and TSH    Mild Cognitive impairment  Having some episodes of sundowning  Metabolic encephalopathy from sepsis  Appreciate Neuro Eval     Atrial Fibrilaltion _ paroxysmal  Not on A/C due to risk of falls  MRI negative for stroke    Dm2 with Hyperglycemia  Continue SSI     Elevated creatinine with underlying CKD2  Hydrate and avoid nephrotoxins    Code status: Fill  DVT prophylaxis: heparin    Care Plan discussed with: Patient/Family  Disposition: TBD         Review of Systems:   A comprehensive review of systems was negative except for that written in the HPI. Vital Signs:    Last 24hrs VS reviewed since prior progress note. Most recent are:  Visit Vitals    /65    Pulse 87    Temp 98.5 °F (36.9 °C)    Resp 18    Ht 6' 1\" (1.854 m)    Wt 78.5 kg (173 lb)    SpO2 95%    BMI 22.82 kg/m2         Intake/Output Summary (Last 24 hours) at 12/02/17 0756  Last data filed at 12/02/17 0400   Gross per 24 hour   Intake                0 ml   Output             1720 ml   Net            -1720 ml        Physical Examination:             Constitutional:  No acute distress, cooperative, pleasant    ENT:  Oral mucous moist, oropharynx benign. Neck supple,    Resp:  CTA bilaterally. No wheezing/rhonchi/rales. No accessory muscle use   CV:  Regular rhythm, normal rate, no murmurs, gallops, rubs    GI:  Soft, non distended, non tender. normoactive bowel sounds, no hepatosplenomegaly     Musculoskeletal:  No edema, warm, 2+ pulses throughout    Neurologic:  Moves all extremities. AAOx3, CN II-XII reviewed     Psych:  Good insight, Not anxious nor agitated. Data Review:    Review and/or order of clinical lab test  Review and/or order of tests in the radiology section of CPT  Decision to obtain old records and/or obtain history from someone other than the patient      Labs:     Recent Labs      12/01/17   0403  11/30/17   0337   WBC  5.7  9.8   HGB  12.0*  13.5   HCT  36.3*  41.9   PLT  116*  133*     Recent Labs      12/01/17   0403  11/30/17   0337  11/29/17   1337   NA  138  139  138   K  3.8  4.2  4.5   CL  108  106  107   CO2  23  24  24   BUN  36*  36*  35*   CREA  1.74*  1.80*  1.96*   GLU  125*  95  202*   CA  8.9  9.4  10.1     Recent Labs      11/29/17   1337   SGOT  22   ALT  17   AP  98   TBILI  1.3*   TP  8.2   ALB  3.0*   GLOB  5.2*     No results for input(s): INR, PTP, APTT in the last 72 hours.     No lab exists for component: INREXT, INREXT   No results for input(s): FE, TIBC, PSAT, FERR in the last 72 hours. No results found for: FOL, RBCF   No results for input(s): PH, PCO2, PO2 in the last 72 hours.   Recent Labs      11/30/17   0337  11/29/17   1337   CPK  333*  334*   TROIQ  <0.04   --      Lab Results   Component Value Date/Time    Cholesterol, total 157 02/17/2011 05:22 AM    HDL Cholesterol 60 02/17/2011 05:22 AM    LDL, calculated 68.4 02/17/2011 05:22 AM    Triglyceride 143 02/17/2011 05:22 AM    CHOL/HDL Ratio 2.6 02/17/2011 05:22 AM     Lab Results   Component Value Date/Time    Glucose (POC) 87 12/02/2017 06:54 AM    Glucose (POC) 153 12/01/2017 09:45 PM    Glucose (POC) 165 12/01/2017 04:29 PM    Glucose (POC) 179 12/01/2017 12:01 PM    Glucose (POC) 109 12/01/2017 07:15 AM     Lab Results   Component Value Date/Time    Color DARK YELLOW 11/29/2017 11:58 PM    Appearance CLOUDY 11/29/2017 11:58 PM    Specific gravity 1.017 11/29/2017 11:58 PM    Specific gravity >1.030 04/04/2010 04:00 PM    pH (UA) 5.5 11/29/2017 11:58 PM    Protein 100 11/29/2017 11:58 PM    Glucose NEGATIVE  11/29/2017 11:58 PM    Ketone NEGATIVE  11/29/2017 11:58 PM    Bilirubin NEGATIVE  11/29/2017 11:58 PM    Urobilinogen 1.0 11/29/2017 11:58 PM    Nitrites POSITIVE 11/29/2017 11:58 PM    Leukocyte Esterase LARGE 11/29/2017 11:58 PM    Epithelial cells FEW 11/29/2017 11:58 PM    Bacteria 2+ 11/29/2017 11:58 PM    WBC >100 11/29/2017 11:58 PM    RBC 10-20 11/29/2017 11:58 PM         Medications Reviewed:     Current Facility-Administered Medications   Medication Dose Route Frequency    cefepime (MAXIPIME) 2 g in 0.9% sodium chloride (MBP/ADV) 100 mL  2 g IntraVENous Q24H    tamsulosin (FLOMAX) capsule 0.4 mg  0.4 mg Oral BID    cranberry-vitamin c-vitamin e 140-100 mg cap 2 Cap (Patient Supplied)  2 Cap Oral DAILY    insulin lispro (HUMALOG) injection   SubCUTAneous AC&HS    albuterol (PROVENTIL VENTOLIN) nebulizer solution 2.5 mg  2.5 mg Nebulization Q4H PRN    dilTIAZem (CARDIZEM) IR tablet 30 mg  30 mg Oral Q6H    acetaminophen (TYLENOL) tablet 650 mg  650 mg Oral Q4H PRN    aspirin delayed-release tablet 81 mg  81 mg Oral DAILY    heparin (porcine) injection 5,000 Units  5,000 Units SubCUTAneous Q12H    digoxin (LANOXIN) tablet 0.125 mg  0.125 mg Oral Once per day on Mon Wed Thu Sat    DULoxetine (CYMBALTA) capsule 30 mg  30 mg Oral QHS    finasteride (PROSCAR) tablet 5 mg  5 mg Oral DAILY    glipiZIDE SR (GLUCOTROL XL) tablet 2.5 mg  2.5 mg Oral DAILY    levothyroxine (SYNTHROID) tablet 100 mcg  100 mcg Oral ACB    SITagliptin (JANUVIA) tablet tab 25 mg  25 mg Oral DAILY    pantoprazole (PROTONIX) tablet 40 mg  40 mg Oral ACB    pramipexole (MIRAPEX) tablet 0.125 mg  0.125 mg Oral QHS    pregabalin (LYRICA) capsule 75 mg  75 mg Oral QHS    famotidine (PEPCID) tablet 20 mg  20 mg Oral QHS    simethicone (MYLICON) tablet 80 mg  80 mg Oral TID    simvastatin (ZOCOR) tablet 20 mg  20 mg Oral QHS    sodium chloride (NS) flush 5-10 mL  5-10 mL IntraVENous Q8H    sodium chloride (NS) flush 5-10 mL  5-10 mL IntraVENous PRN    0.9% sodium chloride infusion  75 mL/hr IntraVENous CONTINUOUS    levoFLOXacin (LEVAQUIN) 750 mg in D5W IVPB  750 mg IntraVENous Q48H    glucose chewable tablet 16 g  4 Tab Oral PRN    dextrose (D50W) injection syrg 12.5-25 g  12.5-25 g IntraVENous PRN    glucagon (GLUCAGEN) injection 1 mg  1 mg IntraMUSCular PRN    digoxin (LANOXIN) tablet 0.125 mg  0.125 mg Oral DAILY     ______________________________________________________________________  EXPECTED LENGTH OF STAY: - - -  ACTUAL LENGTH OF STAY:          3                 Ladarius Fortune MD

## 2017-12-02 NOTE — PROGRESS NOTES
HYPOGLYCEMIC EPISODE DOCUMENTATION    Patient with hypoglycemic episode(s) at 1545 (time) on 12/2/17 (date). BG value(s) pre-treatment 68    Was patient symptomatic?  [] yes, [x] no  Patient was treated with the following rescue medications/treatments: [] D50                [] Glucose tablets                [] Glucagon                [x] 4oz juice                [] 6oz reg soda                [] 8oz low fat milk  BG value post-treatment: 107  Once BG treated and value greater than 80mg/dl, pt was provided with the following:  [] snack  [x] meal

## 2017-12-02 NOTE — PROGRESS NOTES
Problem: Falls - Risk of  Goal: *Absence of Falls  Document Nancy Fall Risk and appropriate interventions in the flowsheet.    Outcome: Progressing Towards Goal  Fall Risk Interventions:  Mobility Interventions: Bed/chair exit alarm, Patient to call before getting OOB    Mentation Interventions: Bed/chair exit alarm, Familiar objects from home, Family/sitter at bedside, More frequent rounding, Reorient patient, Room close to nurse's station, Toileting rounds, Update white board    Medication Interventions: Bed/chair exit alarm, Patient to call before getting OOB, Teach patient to arise slowly    Elimination Interventions: Call light in reach, Patient to call for help with toileting needs, Toilet paper/wipes in reach, Toileting schedule/hourly rounds, Urinal in reach, Bed/chair exit alarm    History of Falls Interventions: Consult care management for discharge planning, Investigate reason for fall, Bed/chair exit alarm, Room close to nurse's station

## 2017-12-02 NOTE — PROGRESS NOTES
Problem: Falls - Risk of  Goal: *Absence of Falls  Document Nancy Fall Risk and appropriate interventions in the flowsheet. Outcome: Progressing Towards Goal  Fall Risk Interventions:  Mobility Interventions: Bed/chair exit alarm, Communicate number of staff needed for ambulation/transfer, Patient to call before getting OOB, Strengthening exercises (ROM-active/passive)    Mentation Interventions: Bed/chair exit alarm, Family/sitter at bedside, More frequent rounding, Room close to nurse's station, Reorient patient, Toileting rounds, Update white board    Medication Interventions: Bed/chair exit alarm, Teach patient to arise slowly, Patient to call before getting OOB    Elimination Interventions: Call light in reach, Toilet paper/wipes in reach, Toileting schedule/hourly rounds    History of Falls Interventions: Consult care management for discharge planning, Evaluate medications/consider consulting pharmacy, Investigate reason for fall, Room close to nurse's station        1730- Bladder scan showed 356, straight cath performed 700mL output. 2000- Bedside and Verbal shift change report given to Lorrie Canas  (oncoming nurse) by Douglas (offgoing nurse). Report included the following information SBAR, Kardex, Intake/Output, MAR, Recent Results and Cardiac Rhythm Afib.

## 2017-12-02 NOTE — PROGRESS NOTES
Primary Nurse Shanique Beth RN and Jayda Rivera RN performed a dual skin assessment on this patient Impairment noted. Patient has: bruise & scab over left eye from fall PTA, multiple scabs on BLE (both ankles), open area with pink bases and scant yellow drainage on lateral side of left foot, red blanchable area on top portion of left foot, scabs/dark areas and blanchable red areas on right great toe/first/second toes.   Brendan score is 19

## 2017-12-02 NOTE — PROGRESS NOTES
TRANSFER - OUT REPORT:    Verbal report given to [facility] [place] (name) on Jhonatan Mean  being transferred to [facility] (unit) for routine progression of care       Report consisted of patients Situation, Background, Assessment and   Recommendations(SBAR). Information from the following report(s) SBAR, Kardex, Intake/Output, MAR, Recent Results and Cardiac Rhythm Afib was reviewed with the receiving nurse. Lines:   Peripheral IV 11/29/17 Left Forearm (Active)   Site Assessment Clean, dry, & intact 12/2/2017  8:30 AM   Phlebitis Assessment 0 12/2/2017  8:30 AM   Infiltration Assessment 0 12/2/2017  8:30 AM   Dressing Status Clean, dry, & intact 12/2/2017  8:30 AM   Dressing Type Transparent;Tape 12/2/2017  8:30 AM   Hub Color/Line Status Blue; Infusing 12/2/2017  8:30 AM   Action Taken Open ports on tubing capped 12/2/2017  8:30 AM   Alcohol Cap Used Yes 12/2/2017  8:30 AM        Opportunity for questions and clarification was provided.       Patient transported with:   Monitor  O2 @ 3 liters  Patient-specific medications from Pharmacy  [facility]

## 2017-12-03 NOTE — PROGRESS NOTES
Bedside shift change report given to Yenni Barajas RN (oncoming nurse) by Jw Macario RN (offgoing nurse). Report included the following information SBAR and Kardex.

## 2017-12-03 NOTE — PROGRESS NOTES
Bedside shift change report given to [facility] (oncoming nurse) by Indu Mead (offgoing nurse). Report included the following information SBAR, Kardex, Intake/Output, MAR and Recent Results.

## 2017-12-03 NOTE — PROGRESS NOTES
Problem: Falls - Risk of  Goal: *Absence of Falls  Document Nancy Fall Risk and appropriate interventions in the flowsheet.    Outcome: Progressing Towards Goal  Fall Risk Interventions:  Mobility Interventions: Bed/chair exit alarm    Mentation Interventions: Bed/chair exit alarm    Medication Interventions: Bed/chair exit alarm    Elimination Interventions: Call light in reach    History of Falls Interventions: Bed/chair exit alarm

## 2017-12-03 NOTE — PROGRESS NOTES
Bedside shift change report given to Ronal Austin RN (oncoming nurse) by Beryl Olivera RN (offgoing nurse). Report included the following information SBAR, Kardex and MAR.

## 2017-12-03 NOTE — PROGRESS NOTES
Hospitalist Progress Note  Ladarius Fortune MD  Answering service: [phone] OR [phone] from in house phone      Date of Service:  12/3/2017  NAME:  Janet Buckner  :  1923  MRN:  [id number]      Admission Summary:   80year-old gentleman with past medical history of atrial fibrillation on Xarelto,   history of NPH with a  shunt, history of1 abnormal EKG,   hypertension, peripheral neuropathy, borderline diabetes, dyslipidemia,   Meniere disease, sensorineural hearing loss, who presents to the   hospital with falls at home    Interval history / Subjective:     Patient seen in room with family at bedside, he had no fevers overnight. He denies any complaints. Assessment & Plan:     Hospital Problems  Date Reviewed: 2017          Codes Class Noted POA    * (Principal)Pneumonia ICD-10-CM: J18.9  ICD-9-CM: 552  2017 Unknown            Pneumonia   Sepsis in the setting of pneumonia/UTI with Fevers and Tachycardia and lactic acidosis  Recheck Cultures done   Added Cefepime  to cover for gram negative pneumonia  Continue levaquin  Sepsis from Pneumonia on admission  Positive urinalysis- R/o UTI - Follow up Cultures    Dyspnea  Acute on chronic Diastolic heart failure  Echo - Left ventricle: Systolic function was normal. Ejection fraction was  estimated in the range of 55 % to 60 %. There were no regional wall motion  Abnormalities. Frequent falls  MRI brain - negative for stroke  Check orthostatics, AM Cortisol   TSH ok    Mild Cognitive impairment  Having some episodes of sundowning  Metabolic encephalopathy from sepsis  Appreciate Neuro Eval     Atrial Fibrilaltion _ paroxysmal  Not on A/C due to risk of falls  MRI negative for stroke  - Atrophy and chronic white matter disease with no acute infarction. Right  frontal ventriculostomy catheter. No hydrocephalus. Dm2  Better controlled.   Continue SSI     Elevated creatinine with underlying CKD2  Hydrate and avoid nephrotoxins    Code status: Fill  DVT prophylaxis: heparin    Care Plan discussed with: Patient/Family  Disposition: [facility]/Rehab         Review of Systems:   A comprehensive review of systems was negative except for that written in the HPI. Vital Signs:    Last 24hrs VS reviewed since prior progress note. Most recent are:  Visit Vitals    /75 (BP 1 Location: Left arm, BP Patient Position: At rest)    Pulse 64    Temp 98 °F (36.7 °C)    Resp 16    Ht 6' 1\" (1.854 m)    Wt 78.5 kg (173 lb)    SpO2 91%    BMI 22.82 kg/m2         Intake/Output Summary (Last 24 hours) at 12/03/17 1409  Last data filed at 12/03/17 1344   Gross per 24 hour   Intake              120 ml   Output             1150 ml   Net            -1030 ml        Physical Examination:             Constitutional:  No acute distress, cooperative, pleasant    ENT:  Oral mucous moist, oropharynx benign. Neck supple,    Resp:  CTA bilaterally. No wheezing/rhonchi/rales. No accessory muscle use   CV:  Regular rhythm, normal rate, no murmurs, gallops, rubs    GI:  Soft, non distended, non tender. normoactive bowel sounds, no hepatosplenomegaly     Musculoskeletal:  No edema, warm, 2+ pulses throughout    Neurologic:  Moves all extremities. AAOx3, CN II-XII reviewed     Psych:  Good insight, Not anxious nor agitated.        Data Review:    Review and/or order of clinical lab test  Review and/or order of tests in the radiology section of CPT  Decision to obtain old records and/or obtain history from someone other than the patient      Labs:     Recent Labs      12/03/17   0637  12/01/17   0403   WBC  5.5  5.7   HGB  11.4*  12.0*   HCT  34.3*  36.3*   PLT  155  116*     Recent Labs      12/03/17   0637  12/01/17   0403   NA  140  138   K  3.6  3.8   CL  109*  108   CO2  21  23   BUN  34*  36*   CREA  1.36*  1.74*   GLU  72  125*   CA  9.0  8.9     No results for input(s): SGOT, GPT, ALT, AP, TBIL, TBILI, TP, ALB, GLOB, GGT, AML, LPSE in the last 72 hours. No lab exists for component: AMYP, HLPSE  No results for input(s): INR, PTP, APTT in the last 72 hours. No lab exists for component: INREXT, INREXT   No results for input(s): FE, TIBC, PSAT, FERR in the last 72 hours. No results found for: FOL, RBCF   No results for input(s): PH, PCO2, PO2 in the last 72 hours. No results for input(s): CPK, CKNDX, TROIQ in the last 72 hours.     No lab exists for component: CPKMB  Lab Results   Component Value Date/Time    Cholesterol, total 157 02/17/2011 05:22 AM    HDL Cholesterol 60 02/17/2011 05:22 AM    LDL, calculated 68.4 02/17/2011 05:22 AM    Triglyceride 143 02/17/2011 05:22 AM    CHOL/HDL Ratio 2.6 02/17/2011 05:22 AM     Lab Results   Component Value Date/Time    Glucose (POC) 94 12/03/2017 11:48 AM    Glucose (POC) 87 12/03/2017 07:18 AM    Glucose (POC) 76 12/03/2017 06:51 AM    Glucose (POC) 120 12/02/2017 09:40 PM    Glucose (POC) 107 12/02/2017 04:43 PM     Lab Results   Component Value Date/Time    Color DARK YELLOW 11/29/2017 11:58 PM    Appearance CLOUDY 11/29/2017 11:58 PM    Specific gravity 1.017 11/29/2017 11:58 PM    Specific gravity >1.030 04/04/2010 04:00 PM    pH (UA) 5.5 11/29/2017 11:58 PM    Protein 100 11/29/2017 11:58 PM    Glucose NEGATIVE  11/29/2017 11:58 PM    Ketone NEGATIVE  11/29/2017 11:58 PM    Bilirubin NEGATIVE  11/29/2017 11:58 PM    Urobilinogen 1.0 11/29/2017 11:58 PM    Nitrites POSITIVE 11/29/2017 11:58 PM    Leukocyte Esterase LARGE 11/29/2017 11:58 PM    Epithelial cells FEW 11/29/2017 11:58 PM    Bacteria 2+ 11/29/2017 11:58 PM    WBC >100 11/29/2017 11:58 PM    RBC 10-20 11/29/2017 11:58 PM     All Micro Results     Procedure Component Value Units Date/Time    CULTURE, BLOOD, PAIRED [[id number]] Collected:  12/01/17 1332    Order Status:  Completed Specimen:  Blood Updated:  12/03/17 0510     Special Requests: NO SPECIAL REQUESTS        Culture result: NO GROWTH 2 DAYS       CULTURE, BLOOD, PAIRED [[id number]] Collected:  11/30/17 0337    Order Status:  Completed Specimen:  Blood Updated:  12/03/17 0509     Special Requests: NO SPECIAL REQUESTS        Culture result: NO GROWTH 3 DAYS       CULTURE, BLOOD, PAIRED [[id number]]     Order Status:  Sent Specimen:  Blood     CULTURE, URINE [[id number]]  (Abnormal) Collected:  11/29/17 8528    Order Status:  Completed Specimen:  Urine Updated:  12/01/17 1004     Special Requests: --        NO SPECIAL REQUESTS  Reflexed from [id number]       [place] Count --        68128  COLONIES/mL       Culture result:         STAPHYLOCOCCUS SPECIES, COAGULASE NEGATIVE (A)            Medications Reviewed:     Current Facility-Administered Medications   Medication Dose Route Frequency    cefepime (MAXIPIME) 2 g in 0.9% sodium chloride (MBP/ADV) 100 mL  2 g IntraVENous Q24H    tamsulosin (FLOMAX) capsule 0.4 mg  0.4 mg Oral BID    cranberry-vitamin c-vitamin e 140-100 mg cap 2 Cap (Patient Supplied)  2 Cap Oral DAILY    insulin lispro (HUMALOG) injection   SubCUTAneous AC&HS    albuterol (PROVENTIL VENTOLIN) nebulizer solution 2.5 mg  2.5 mg Nebulization Q4H PRN    dilTIAZem (CARDIZEM) IR tablet 30 mg  30 mg Oral Q6H    acetaminophen (TYLENOL) tablet 650 mg  650 mg Oral Q4H PRN    aspirin delayed-release tablet 81 mg  81 mg Oral DAILY    heparin (porcine) injection 5,000 Units  5,000 Units SubCUTAneous Q12H    digoxin (LANOXIN) tablet 0.125 mg  0.125 mg Oral Once per day on Mon Wed Thu Sat    DULoxetine (CYMBALTA) capsule 30 mg  30 mg Oral QHS    finasteride (PROSCAR) tablet 5 mg  5 mg Oral DAILY    glipiZIDE SR (GLUCOTROL XL) tablet 2.5 mg  2.5 mg Oral DAILY    levothyroxine (SYNTHROID) tablet 100 mcg  100 mcg Oral ACB    SITagliptin (JANUVIA) tablet tab 25 mg  25 mg Oral DAILY    pantoprazole (PROTONIX) tablet 40 mg  40 mg Oral ACB    pramipexole (MIRAPEX) tablet 0.125 mg  0.125 mg Oral QHS    pregabalin (LYRICA) capsule 75 mg  75 mg Oral QHS    famotidine (PEPCID) tablet 20 mg  20 mg Oral QHS    simethicone (MYLICON) tablet 80 mg  80 mg Oral TID    simvastatin (ZOCOR) tablet 20 mg  20 mg Oral QHS    sodium chloride (NS) flush 5-10 mL  5-10 mL IntraVENous Q8H    sodium chloride (NS) flush 5-10 mL  5-10 mL IntraVENous PRN    0.9% sodium chloride infusion  75 mL/hr IntraVENous CONTINUOUS    levoFLOXacin (LEVAQUIN) 750 mg in D5W IVPB  750 mg IntraVENous Q48H    glucose chewable tablet 16 g  4 Tab Oral PRN    dextrose (D50W) injection syrg 12.5-25 g  12.5-25 g IntraVENous PRN    glucagon (GLUCAGEN) injection 1 mg  1 mg IntraMUSCular PRN    digoxin (LANOXIN) tablet 0.125 mg  0.125 mg Oral DAILY     ______________________________________________________________________  EXPECTED LENGTH OF STAY: - - -  ACTUAL LENGTH OF STAY:          4                 Sherryle Fire, MD

## 2017-12-04 NOTE — PROGRESS NOTES
Bedside shift change report given to Zhao Anderson RN (oncoming nurse) by Herber Dutta RN (offgoing nurse). Report included the following information SBAR, Kardex, Intake/Output, MAR and Recent Results.

## 2017-12-04 NOTE — PROGRESS NOTES
Update:  Received call from [facility] liaison, stating patient could return today. Went to start discharge paperwork as far as CM is involved, and noted that patient already discharged and had left [facility]. Spoke with Unit Manager as patient's RN was busy. They were told in rounds patient was ready to go but told unit RN that this CM was handling so check with me. RN did not call, and patient was discharged with his son taking him to [facility]. Patient discharged to [facility] today. Spoke with Deejay Jones, [address] [place] [address] Mayra Liaison there, who said they are going to have to get an authorization from his insurance company for him to go to the healthcare side. This is new for [facility]. They must be billing Medicare now where before they did not. Sent referral to Deejay Jones again who is home with a sick child, and she will forward to her business office to get insurance auth. If patient is authorized today, his RN is [facility], and he is going to [facility], report [phone].

## 2017-12-04 NOTE — PROGRESS NOTES
Discharge instructions/summary called to [facility] @ [place] [address] ([phone]). All questions were answered. Copies of Kardex, MAR, and AVS sent w/ patient. Transported via private transportation w/ son on RA to facility. O2 sat. 93% on RA prior to discharge.

## 2017-12-04 NOTE — PROGRESS NOTES
Bedside shift change report given to Meg Wyman (oncoming nurse) by Jovon Naylor RN (offgoing nurse). Report included the following information SBAR and Kardex.

## 2018-01-01 ENCOUNTER — HOME CARE VISIT (OUTPATIENT)
Dept: HOSPICE | Facility: HOSPICE | Age: 83
End: 2018-01-01
Payer: MEDICARE

## 2018-01-01 ENCOUNTER — APPOINTMENT (OUTPATIENT)
Dept: GENERAL RADIOLOGY | Age: 83
End: 2018-01-01
Attending: PHYSICIAN ASSISTANT
Payer: MEDICARE

## 2018-01-01 ENCOUNTER — APPOINTMENT (OUTPATIENT)
Dept: GENERAL RADIOLOGY | Age: 83
DRG: 193 | End: 2018-01-01
Attending: EMERGENCY MEDICINE
Payer: MEDICARE

## 2018-01-01 ENCOUNTER — APPOINTMENT (OUTPATIENT)
Dept: CT IMAGING | Age: 83
End: 2018-01-01
Attending: PHYSICIAN ASSISTANT
Payer: MEDICARE

## 2018-01-01 ENCOUNTER — APPOINTMENT (OUTPATIENT)
Dept: GENERAL RADIOLOGY | Age: 83
DRG: 193 | End: 2018-01-01
Attending: FAMILY MEDICINE
Payer: MEDICARE

## 2018-01-01 ENCOUNTER — HOSPITAL ENCOUNTER (INPATIENT)
Age: 83
LOS: 4 days | DRG: 951 | End: 2018-03-11
Attending: INTERNAL MEDICINE | Admitting: INTERNAL MEDICINE
Payer: OTHER MISCELLANEOUS

## 2018-01-01 ENCOUNTER — HOSPICE ADMISSION (OUTPATIENT)
Dept: HOSPICE | Facility: HOSPICE | Age: 83
End: 2018-01-01
Payer: MEDICARE

## 2018-01-01 ENCOUNTER — APPOINTMENT (OUTPATIENT)
Dept: GENERAL RADIOLOGY | Age: 83
DRG: 085 | End: 2018-01-01
Attending: STUDENT IN AN ORGANIZED HEALTH CARE EDUCATION/TRAINING PROGRAM
Payer: MEDICARE

## 2018-01-01 ENCOUNTER — HOSPITAL ENCOUNTER (OUTPATIENT)
Dept: CT IMAGING | Age: 83
Discharge: HOME | End: 2018-01-06
Payer: MEDICARE

## 2018-01-01 ENCOUNTER — APPOINTMENT (OUTPATIENT)
Dept: GENERAL RADIOLOGY | Age: 83
DRG: 085 | End: 2018-01-01
Attending: HOSPITALIST
Payer: MEDICARE

## 2018-01-01 ENCOUNTER — APPOINTMENT (OUTPATIENT)
Dept: CT IMAGING | Age: 83
DRG: 085 | End: 2018-01-01
Attending: STUDENT IN AN ORGANIZED HEALTH CARE EDUCATION/TRAINING PROGRAM
Payer: MEDICARE

## 2018-01-01 ENCOUNTER — APPOINTMENT (OUTPATIENT)
Dept: CT IMAGING | Age: 83
DRG: 085 | End: 2018-01-01
Attending: NURSE PRACTITIONER
Payer: MEDICARE

## 2018-01-01 ENCOUNTER — HOSPITAL ENCOUNTER (EMERGENCY)
Age: 83
Discharge: NURSING, REHAB OR LONG-TERM CARE | End: 2018-03-03
Attending: EMERGENCY MEDICINE | Admitting: EMERGENCY MEDICINE
Payer: MEDICARE

## 2018-01-01 ENCOUNTER — HOSPITAL ENCOUNTER (OUTPATIENT)
Dept: MRI IMAGING | Age: 83
Discharge: HOME | End: 2018-01-15
Attending: FAMILY MEDICINE
Payer: MEDICARE

## 2018-01-01 ENCOUNTER — APPOINTMENT (OUTPATIENT)
Dept: CT IMAGING | Age: 83
DRG: 085 | End: 2018-01-01
Attending: NEUROLOGICAL SURGERY
Payer: MEDICARE

## 2018-01-01 ENCOUNTER — APPOINTMENT (OUTPATIENT)
Dept: GENERAL RADIOLOGY | Age: 83
DRG: 193 | End: 2018-01-01
Attending: HOSPITALIST
Payer: MEDICARE

## 2018-01-01 ENCOUNTER — APPOINTMENT (OUTPATIENT)
Dept: CT IMAGING | Age: 83
DRG: 193 | End: 2018-01-01
Attending: FAMILY MEDICINE
Payer: MEDICARE

## 2018-01-01 ENCOUNTER — HOSPITAL ENCOUNTER (INPATIENT)
Age: 83
LOS: 2 days | Discharge: HOSPICE | DRG: 085 | End: 2018-03-07
Attending: STUDENT IN AN ORGANIZED HEALTH CARE EDUCATION/TRAINING PROGRAM | Admitting: HOSPITALIST
Payer: MEDICARE

## 2018-01-01 ENCOUNTER — HOSPITAL ENCOUNTER (INPATIENT)
Age: 83
LOS: 5 days | Discharge: NURSING, REHAB OR LONG-TERM CARE | DRG: 193 | End: 2018-02-11
Attending: EMERGENCY MEDICINE | Admitting: HOSPITALIST
Payer: MEDICARE

## 2018-01-01 VITALS
SYSTOLIC BLOOD PRESSURE: 92 MMHG | DIASTOLIC BLOOD PRESSURE: 66 MMHG | OXYGEN SATURATION: 92 % | HEART RATE: 106 BPM | RESPIRATION RATE: 20 BRPM | TEMPERATURE: 99.9 F

## 2018-01-01 VITALS
BODY MASS INDEX: 20.99 KG/M2 | WEIGHT: 155 LBS | DIASTOLIC BLOOD PRESSURE: 81 MMHG | HEART RATE: 93 BPM | TEMPERATURE: 100.1 F | OXYGEN SATURATION: 94 % | RESPIRATION RATE: 22 BRPM | SYSTOLIC BLOOD PRESSURE: 157 MMHG | HEIGHT: 72 IN

## 2018-01-01 VITALS
WEIGHT: 158.73 LBS | HEART RATE: 66 BPM | BODY MASS INDEX: 21.5 KG/M2 | HEIGHT: 72 IN | OXYGEN SATURATION: 96 % | RESPIRATION RATE: 18 BRPM | SYSTOLIC BLOOD PRESSURE: 122 MMHG | TEMPERATURE: 98 F | DIASTOLIC BLOOD PRESSURE: 82 MMHG

## 2018-01-01 VITALS
TEMPERATURE: 98.1 F | HEART RATE: 91 BPM | BODY MASS INDEX: 22.51 KG/M2 | SYSTOLIC BLOOD PRESSURE: 189 MMHG | WEIGHT: 166 LBS | DIASTOLIC BLOOD PRESSURE: 105 MMHG | RESPIRATION RATE: 21 BRPM | OXYGEN SATURATION: 93 %

## 2018-01-01 DIAGNOSIS — S06.5XAA SDH (SUBDURAL HEMATOMA): Primary | ICD-10-CM

## 2018-01-01 DIAGNOSIS — Z51.89 AFTER CARE: ICD-10-CM
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
## 2018-01-01 DIAGNOSIS — Z71.89 GOALS OF CARE, COUNSELING/DISCUSSION: ICD-10-CM

## 2018-01-01 DIAGNOSIS — R41.89 DECREASED RESPONSIVENESS: ICD-10-CM

## 2018-01-01 DIAGNOSIS — Z51.5 DYING CARE: ICD-10-CM

## 2018-01-01 DIAGNOSIS — R52 PAIN: ICD-10-CM

## 2018-01-01 DIAGNOSIS — R53.1 WEAKNESS: ICD-10-CM

## 2018-01-01 DIAGNOSIS — S06.5XAA SUBDURAL HEMATOMA: ICD-10-CM

## 2018-01-01 DIAGNOSIS — R45.1 AGITATION: ICD-10-CM

## 2018-01-01 DIAGNOSIS — R06.82 TACHYPNEA: ICD-10-CM

## 2018-01-01 DIAGNOSIS — B34.9 VIRAL SYNDROME: Primary | ICD-10-CM

## 2018-01-01 DIAGNOSIS — R50.81 FEVER IN OTHER DISEASES: ICD-10-CM

## 2018-01-01 DIAGNOSIS — Z91.81 HISTORY OF FALLING: ICD-10-CM

## 2018-01-01 LAB
ALBUMIN SERPL-MCNC: 3.2 G/DL (ref 3.5–5)
ALBUMIN SERPL-MCNC: 3.2 G/DL (ref 3.5–5)
ALBUMIN SERPL-MCNC: 3.3 G/DL (ref 3.5–5)
ALBUMIN SERPL-MCNC: 3.8 G/DL (ref 3.5–5)
ALBUMIN/GLOB SERPL: 0.6 {RATIO} (ref 1.1–2.2)
ALBUMIN/GLOB SERPL: 0.7 {RATIO} (ref 1.1–2.2)
ALP SERPL-CCNC: 100 U/L (ref 45–117)
ALP SERPL-CCNC: 76 U/L (ref 45–117)
ALP SERPL-CCNC: 92 U/L (ref 45–117)
ALP SERPL-CCNC: 96 U/L (ref 45–117)
ALT SERPL-CCNC: 15 U/L (ref 12–78)
ALT SERPL-CCNC: 17 U/L (ref 12–78)
ALT SERPL-CCNC: 21 U/L (ref 12–78)
ALT SERPL-CCNC: 22 U/L (ref 12–78)
ANION GAP SERPL CALC-SCNC: 10 MMOL/L (ref 5–15)
ANION GAP SERPL CALC-SCNC: 10 MMOL/L (ref 5–15)
ANION GAP SERPL CALC-SCNC: 11 MMOL/L (ref 5–15)
ANION GAP SERPL CALC-SCNC: 12 MMOL/L (ref 5–15)
ANION GAP SERPL CALC-SCNC: 7 MMOL/L (ref 5–15)
ANION GAP SERPL CALC-SCNC: 8 MMOL/L (ref 5–15)
ANION GAP SERPL CALC-SCNC: 9 MMOL/L (ref 5–15)
APPEARANCE UR: CLEAR
ARTERIAL PATENCY WRIST A: YES
AST SERPL-CCNC: 21 U/L (ref 15–37)
AST SERPL-CCNC: 24 U/L (ref 15–37)
AST SERPL-CCNC: 25 U/L (ref 15–37)
AST SERPL-CCNC: 28 U/L (ref 15–37)
ATRIAL RATE: 104 BPM
ATRIAL RATE: 250 BPM
ATRIAL RATE: 43 BPM
BACTERIA SPEC CULT: NORMAL
BACTERIA URNS QL MICRO: NEGATIVE /HPF
BASE DEFICIT BLD-SCNC: 2 MMOL/L
BASOPHILS # BLD: 0 K/UL (ref 0–0.1)
BASOPHILS # BLD: 0.1 K/UL (ref 0–0.1)
BASOPHILS NFR BLD: 0 % (ref 0–1)
BASOPHILS NFR BLD: 1 % (ref 0–1)
BDY SITE: ABNORMAL
BILIRUB SERPL-MCNC: 0.8 MG/DL (ref 0.2–1)
BILIRUB SERPL-MCNC: 1 MG/DL (ref 0.2–1)
BILIRUB SERPL-MCNC: 1.7 MG/DL (ref 0.2–1)
BILIRUB SERPL-MCNC: 2.5 MG/DL (ref 0.2–1)
BILIRUB UR QL CFM: NEGATIVE
BILIRUB UR QL: NEGATIVE
BILIRUB UR QL: NEGATIVE
BNP SERPL-MCNC: 1331 PG/ML (ref 0–450)
BNP SERPL-MCNC: 778 PG/ML (ref 0–450)
BUN SERPL-MCNC: 19 MG/DL (ref 6–20)
BUN SERPL-MCNC: 22 MG/DL (ref 6–20)
BUN SERPL-MCNC: 23 MG/DL (ref 6–20)
BUN SERPL-MCNC: 24 MG/DL (ref 6–20)
BUN SERPL-MCNC: 25 MG/DL (ref 6–20)
BUN SERPL-MCNC: 25 MG/DL (ref 6–20)
BUN SERPL-MCNC: 28 MG/DL (ref 6–20)
BUN SERPL-MCNC: 30 MG/DL (ref 6–20)
BUN SERPL-MCNC: 30 MG/DL (ref 6–20)
BUN/CREAT SERPL: 13 (ref 12–20)
BUN/CREAT SERPL: 13 (ref 12–20)
BUN/CREAT SERPL: 15 (ref 12–20)
BUN/CREAT SERPL: 15 (ref 12–20)
BUN/CREAT SERPL: 16 (ref 12–20)
BUN/CREAT SERPL: 18 (ref 12–20)
BUN/CREAT SERPL: 19 (ref 12–20)
BUN/CREAT SERPL: 20 (ref 12–20)
BUN/CREAT SERPL: 22 (ref 12–20)
CALCIUM SERPL-MCNC: 10 MG/DL (ref 8.5–10.1)
CALCIUM SERPL-MCNC: 8.5 MG/DL (ref 8.5–10.1)
CALCIUM SERPL-MCNC: 8.9 MG/DL (ref 8.5–10.1)
CALCIUM SERPL-MCNC: 9.1 MG/DL (ref 8.5–10.1)
CALCIUM SERPL-MCNC: 9.2 MG/DL (ref 8.5–10.1)
CALCIUM SERPL-MCNC: 9.2 MG/DL (ref 8.5–10.1)
CALCIUM SERPL-MCNC: 9.3 MG/DL (ref 8.5–10.1)
CALCIUM SERPL-MCNC: 9.3 MG/DL (ref 8.5–10.1)
CALCIUM SERPL-MCNC: 9.9 MG/DL (ref 8.5–10.1)
CALCULATED R AXIS, ECG10: -75 DEGREES
CALCULATED R AXIS, ECG10: -76 DEGREES
CALCULATED R AXIS, ECG10: -79 DEGREES
CALCULATED T AXIS, ECG11: 23 DEGREES
CALCULATED T AXIS, ECG11: 27 DEGREES
CALCULATED T AXIS, ECG11: 86 DEGREES
CHLORIDE SERPL-SCNC: 100 MMOL/L (ref 97–108)
CHLORIDE SERPL-SCNC: 101 MMOL/L (ref 97–108)
CHLORIDE SERPL-SCNC: 101 MMOL/L (ref 97–108)
CHLORIDE SERPL-SCNC: 102 MMOL/L (ref 97–108)
CHLORIDE SERPL-SCNC: 103 MMOL/L (ref 97–108)
CHLORIDE SERPL-SCNC: 106 MMOL/L (ref 97–108)
CHLORIDE SERPL-SCNC: 107 MMOL/L (ref 97–108)
CHLORIDE SERPL-SCNC: 112 MMOL/L (ref 97–108)
CHLORIDE SERPL-SCNC: 99 MMOL/L (ref 97–108)
CO2 SERPL-SCNC: 22 MMOL/L (ref 21–32)
CO2 SERPL-SCNC: 22 MMOL/L (ref 21–32)
CO2 SERPL-SCNC: 25 MMOL/L (ref 21–32)
CO2 SERPL-SCNC: 26 MMOL/L (ref 21–32)
CO2 SERPL-SCNC: 26 MMOL/L (ref 21–32)
CO2 SERPL-SCNC: 27 MMOL/L (ref 21–32)
CO2 SERPL-SCNC: 28 MMOL/L (ref 21–32)
CO2 SERPL-SCNC: 28 MMOL/L (ref 21–32)
CO2 SERPL-SCNC: 29 MMOL/L (ref 21–32)
COLOR UR: ABNORMAL
CREAT SERPL-MCNC: 1.37 MG/DL (ref 0.7–1.3)
CREAT SERPL-MCNC: 1.38 MG/DL (ref 0.7–1.3)
CREAT SERPL-MCNC: 1.4 MG/DL (ref 0.7–1.3)
CREAT SERPL-MCNC: 1.43 MG/DL (ref 0.7–1.3)
CREAT SERPL-MCNC: 1.5 MG/DL (ref 0.7–1.3)
CREAT SERPL-MCNC: 1.55 MG/DL (ref 0.7–1.3)
CREAT SERPL-MCNC: 1.59 MG/DL (ref 0.7–1.3)
CREAT SERPL-MCNC: 1.62 MG/DL (ref 0.7–1.3)
CREAT SERPL-MCNC: 1.7 MG/DL (ref 0.7–1.3)
DIAGNOSIS, 93000: NORMAL
DIFFERENTIAL METHOD BLD: ABNORMAL
DIGOXIN SERPL-MCNC: 0.8 NG/ML (ref 0.9–2)
DIGOXIN SERPL-MCNC: 0.8 NG/ML (ref 0.9–2)
EOSINOPHIL # BLD: 0 K/UL (ref 0–0.4)
EOSINOPHIL # BLD: 0.1 K/UL (ref 0–0.4)
EOSINOPHIL # BLD: 0.2 K/UL (ref 0–0.4)
EOSINOPHIL NFR BLD: 0 % (ref 0–7)
EOSINOPHIL NFR BLD: 1 % (ref 0–7)
EOSINOPHIL NFR BLD: 2 % (ref 0–7)
EOSINOPHIL NFR BLD: 2 % (ref 0–7)
EOSINOPHIL NFR BLD: 4 % (ref 0–7)
EPITH CASTS URNS QL MICRO: ABNORMAL /LPF
ERYTHROCYTE [DISTWIDTH] IN BLOOD BY AUTOMATED COUNT: 13.9 % (ref 11.5–14.5)
ERYTHROCYTE [DISTWIDTH] IN BLOOD BY AUTOMATED COUNT: 13.9 % (ref 11.5–14.5)
ERYTHROCYTE [DISTWIDTH] IN BLOOD BY AUTOMATED COUNT: 14 % (ref 11.5–14.5)
ERYTHROCYTE [DISTWIDTH] IN BLOOD BY AUTOMATED COUNT: 14.1 % (ref 11.5–14.5)
ERYTHROCYTE [DISTWIDTH] IN BLOOD BY AUTOMATED COUNT: 14.5 % (ref 11.5–14.5)
ERYTHROCYTE [DISTWIDTH] IN BLOOD BY AUTOMATED COUNT: 14.6 % (ref 11.5–14.5)
ERYTHROCYTE [DISTWIDTH] IN BLOOD BY AUTOMATED COUNT: 15 % (ref 11.5–14.5)
ERYTHROCYTE [DISTWIDTH] IN BLOOD BY AUTOMATED COUNT: 15.3 % (ref 11.5–14.5)
ERYTHROCYTE [DISTWIDTH] IN BLOOD BY AUTOMATED COUNT: 15.3 % (ref 11.5–14.5)
EST. AVERAGE GLUCOSE BLD GHB EST-MCNC: 134 MG/DL
FLUAV AG NPH QL IA: POSITIVE
FLUBV AG NOSE QL IA: NEGATIVE
GAS FLOW.O2 O2 DELIVERY SYS: ABNORMAL L/MIN
GAS FLOW.O2 SETTING OXYMISER: 4 L/M
GLOBULIN SER CALC-MCNC: 5.2 G/DL (ref 2–4)
GLOBULIN SER CALC-MCNC: 5.2 G/DL (ref 2–4)
GLOBULIN SER CALC-MCNC: 5.4 G/DL (ref 2–4)
GLOBULIN SER CALC-MCNC: 5.5 G/DL (ref 2–4)
GLUCOSE BLD STRIP.AUTO-MCNC: 108 MG/DL (ref 65–100)
GLUCOSE BLD STRIP.AUTO-MCNC: 109 MG/DL (ref 65–100)
GLUCOSE BLD STRIP.AUTO-MCNC: 113 MG/DL (ref 65–100)
GLUCOSE BLD STRIP.AUTO-MCNC: 113 MG/DL (ref 65–100)
GLUCOSE BLD STRIP.AUTO-MCNC: 118 MG/DL (ref 65–100)
GLUCOSE BLD STRIP.AUTO-MCNC: 119 MG/DL (ref 65–100)
GLUCOSE BLD STRIP.AUTO-MCNC: 121 MG/DL (ref 65–100)
GLUCOSE BLD STRIP.AUTO-MCNC: 123 MG/DL (ref 65–100)
GLUCOSE BLD STRIP.AUTO-MCNC: 132 MG/DL (ref 65–100)
GLUCOSE BLD STRIP.AUTO-MCNC: 134 MG/DL (ref 65–100)
GLUCOSE BLD STRIP.AUTO-MCNC: 136 MG/DL (ref 65–100)
GLUCOSE BLD STRIP.AUTO-MCNC: 141 MG/DL (ref 65–100)
GLUCOSE BLD STRIP.AUTO-MCNC: 147 MG/DL (ref 65–100)
GLUCOSE BLD STRIP.AUTO-MCNC: 159 MG/DL (ref 65–100)
GLUCOSE BLD STRIP.AUTO-MCNC: 160 MG/DL (ref 65–100)
GLUCOSE BLD STRIP.AUTO-MCNC: 172 MG/DL (ref 65–100)
GLUCOSE BLD STRIP.AUTO-MCNC: 181 MG/DL (ref 65–100)
GLUCOSE BLD STRIP.AUTO-MCNC: 219 MG/DL (ref 65–100)
GLUCOSE BLD STRIP.AUTO-MCNC: 219 MG/DL (ref 65–100)
GLUCOSE BLD STRIP.AUTO-MCNC: 268 MG/DL (ref 65–100)
GLUCOSE BLD STRIP.AUTO-MCNC: 79 MG/DL (ref 65–100)
GLUCOSE BLD STRIP.AUTO-MCNC: 86 MG/DL (ref 65–100)
GLUCOSE BLD STRIP.AUTO-MCNC: 90 MG/DL (ref 65–100)
GLUCOSE BLD STRIP.AUTO-MCNC: 91 MG/DL (ref 65–100)
GLUCOSE BLD STRIP.AUTO-MCNC: 95 MG/DL (ref 65–100)
GLUCOSE SERPL-MCNC: 110 MG/DL (ref 65–100)
GLUCOSE SERPL-MCNC: 127 MG/DL (ref 65–100)
GLUCOSE SERPL-MCNC: 133 MG/DL (ref 65–100)
GLUCOSE SERPL-MCNC: 193 MG/DL (ref 65–100)
GLUCOSE SERPL-MCNC: 250 MG/DL (ref 65–100)
GLUCOSE SERPL-MCNC: 279 MG/DL (ref 65–100)
GLUCOSE SERPL-MCNC: 83 MG/DL (ref 65–100)
GLUCOSE SERPL-MCNC: 85 MG/DL (ref 65–100)
GLUCOSE SERPL-MCNC: 99 MG/DL (ref 65–100)
GLUCOSE UR STRIP.AUTO-MCNC: 100 MG/DL
GLUCOSE UR STRIP.AUTO-MCNC: NEGATIVE MG/DL
GLUCOSE UR STRIP.AUTO-MCNC: NEGATIVE MG/DL
HBA1C MFR BLD: 6.3 % (ref 4.2–6.3)
HCO3 BLD-SCNC: 22.8 MMOL/L (ref 22–26)
HCT VFR BLD AUTO: 39 % (ref 36.6–50.3)
HCT VFR BLD AUTO: 39 % (ref 36.6–50.3)
HCT VFR BLD AUTO: 40.4 % (ref 36.6–50.3)
HCT VFR BLD AUTO: 40.4 % (ref 36.6–50.3)
HCT VFR BLD AUTO: 40.6 % (ref 36.6–50.3)
HCT VFR BLD AUTO: 42 % (ref 36.6–50.3)
HCT VFR BLD AUTO: 42.5 % (ref 36.6–50.3)
HCT VFR BLD AUTO: 43.6 % (ref 36.6–50.3)
HCT VFR BLD AUTO: 45 % (ref 36.6–50.3)
HGB BLD-MCNC: 12.7 G/DL (ref 12.1–17)
HGB BLD-MCNC: 13 G/DL (ref 12.1–17)
HGB BLD-MCNC: 13.1 G/DL (ref 12.1–17)
HGB BLD-MCNC: 13.5 G/DL (ref 12.1–17)
HGB BLD-MCNC: 13.6 G/DL (ref 12.1–17)
HGB BLD-MCNC: 13.9 G/DL (ref 12.1–17)
HGB BLD-MCNC: 14.1 G/DL (ref 12.1–17)
HGB BLD-MCNC: 14.4 G/DL (ref 12.1–17)
HGB BLD-MCNC: 14.6 G/DL (ref 12.1–17)
HGB UR QL STRIP: ABNORMAL
HGB UR QL STRIP: NEGATIVE
HGB UR QL STRIP: NEGATIVE
HYALINE CASTS URNS QL MICRO: ABNORMAL /LPF (ref 0–5)
IMM GRANULOCYTES # BLD: 0 K/UL
IMM GRANULOCYTES # BLD: 0 K/UL
IMM GRANULOCYTES # BLD: 0 K/UL (ref 0–0.04)
IMM GRANULOCYTES # BLD: 0 K/UL (ref 0–0.04)
IMM GRANULOCYTES # BLD: 0.1 K/UL (ref 0–0.04)
IMM GRANULOCYTES NFR BLD AUTO: 0 %
IMM GRANULOCYTES NFR BLD AUTO: 0 %
IMM GRANULOCYTES NFR BLD AUTO: 0 % (ref 0–0.5)
IMM GRANULOCYTES NFR BLD AUTO: 1 % (ref 0–0.5)
IMM GRANULOCYTES NFR BLD AUTO: 1 % (ref 0–0.5)
INR BLD: 1.1 (ref 0.9–1.2)
INR BLD: 1.2 (ref 0.9–1.2)
KETONES UR QL STRIP.AUTO: ABNORMAL MG/DL
KETONES UR QL STRIP.AUTO: NEGATIVE MG/DL
KETONES UR QL STRIP.AUTO: NEGATIVE MG/DL
LACTATE SERPL-SCNC: 1.2 MMOL/L (ref 0.4–2)
LACTATE SERPL-SCNC: 1.4 MMOL/L (ref 0.4–2)
LACTATE SERPL-SCNC: 2.8 MMOL/L (ref 0.4–2)
LEUKOCYTE ESTERASE UR QL STRIP.AUTO: NEGATIVE
LYMPHOCYTES # BLD: 0.6 K/UL (ref 0.8–3.5)
LYMPHOCYTES # BLD: 0.7 K/UL (ref 0.8–3.5)
LYMPHOCYTES # BLD: 0.8 K/UL (ref 0.8–3.5)
LYMPHOCYTES # BLD: 1 K/UL (ref 0.8–3.5)
LYMPHOCYTES # BLD: 1 K/UL (ref 0.8–3.5)
LYMPHOCYTES NFR BLD: 12 % (ref 12–49)
LYMPHOCYTES NFR BLD: 15 % (ref 12–49)
LYMPHOCYTES NFR BLD: 18 % (ref 12–49)
LYMPHOCYTES NFR BLD: 7 % (ref 12–49)
LYMPHOCYTES NFR BLD: 9 % (ref 12–49)
MAGNESIUM SERPL-MCNC: 1.6 MG/DL (ref 1.6–2.4)
MCH RBC QN AUTO: 30.7 PG (ref 26–34)
MCH RBC QN AUTO: 30.8 PG (ref 26–34)
MCH RBC QN AUTO: 31 PG (ref 26–34)
MCH RBC QN AUTO: 31.2 PG (ref 26–34)
MCH RBC QN AUTO: 31.3 PG (ref 26–34)
MCH RBC QN AUTO: 31.6 PG (ref 26–34)
MCHC RBC AUTO-ENTMCNC: 32.3 G/DL (ref 30–36.5)
MCHC RBC AUTO-ENTMCNC: 32.4 G/DL (ref 30–36.5)
MCHC RBC AUTO-ENTMCNC: 32.4 G/DL (ref 30–36.5)
MCHC RBC AUTO-ENTMCNC: 32.6 G/DL (ref 30–36.5)
MCHC RBC AUTO-ENTMCNC: 33.1 G/DL (ref 30–36.5)
MCHC RBC AUTO-ENTMCNC: 33.3 G/DL (ref 30–36.5)
MCHC RBC AUTO-ENTMCNC: 33.3 G/DL (ref 30–36.5)
MCHC RBC AUTO-ENTMCNC: 33.7 G/DL (ref 30–36.5)
MCHC RBC AUTO-ENTMCNC: 33.9 G/DL (ref 30–36.5)
MCV RBC AUTO: 92 FL (ref 80–99)
MCV RBC AUTO: 92.5 FL (ref 80–99)
MCV RBC AUTO: 93.5 FL (ref 80–99)
MCV RBC AUTO: 93.5 FL (ref 80–99)
MCV RBC AUTO: 94 FL (ref 80–99)
MCV RBC AUTO: 94.2 FL (ref 80–99)
MCV RBC AUTO: 96.2 FL (ref 80–99)
MCV RBC AUTO: 96.4 FL (ref 80–99)
MCV RBC AUTO: 96.5 FL (ref 80–99)
MONOCYTES # BLD: 0.3 K/UL (ref 0–1)
MONOCYTES # BLD: 0.7 K/UL (ref 0–1)
MONOCYTES # BLD: 0.8 K/UL (ref 0–1)
MONOCYTES NFR BLD: 10 % (ref 5–13)
MONOCYTES NFR BLD: 10 % (ref 5–13)
MONOCYTES NFR BLD: 11 % (ref 5–13)
MONOCYTES NFR BLD: 7 % (ref 5–13)
MONOCYTES NFR BLD: 8 % (ref 5–13)
MYELOCYTES NFR BLD MANUAL: 2 %
NEUTS BAND NFR BLD MANUAL: 3 % (ref 0–6)
NEUTS BAND NFR BLD MANUAL: 3 % (ref 0–6)
NEUTS SEG # BLD: 3.2 K/UL (ref 1.8–8)
NEUTS SEG # BLD: 4.8 K/UL (ref 1.8–8)
NEUTS SEG # BLD: 5.6 K/UL (ref 1.8–8)
NEUTS SEG # BLD: 6.2 K/UL (ref 1.8–8)
NEUTS SEG # BLD: 9.1 K/UL (ref 1.8–8)
NEUTS SEG NFR BLD: 68 % (ref 32–75)
NEUTS SEG NFR BLD: 70 % (ref 32–75)
NEUTS SEG NFR BLD: 75 % (ref 32–75)
NEUTS SEG NFR BLD: 76 % (ref 32–75)
NEUTS SEG NFR BLD: 85 % (ref 32–75)
NITRITE UR QL STRIP.AUTO: NEGATIVE
NRBC # BLD: 0 K/UL (ref 0–0.01)
NRBC BLD-RTO: 0 PER 100 WBC
PCO2 BLD: 34.9 MMHG (ref 35–45)
PH BLD: 7.42 [PH] (ref 7.35–7.45)
PH UR STRIP: 6.5 [PH] (ref 5–8)
PH UR STRIP: 6.5 [PH] (ref 5–8)
PH UR STRIP: 8.5 [PH] (ref 5–8)
PLATELET # BLD AUTO: 117 K/UL (ref 150–400)
PLATELET # BLD AUTO: 127 K/UL (ref 150–400)
PLATELET # BLD AUTO: 131 K/UL (ref 150–400)
PLATELET # BLD AUTO: 135 K/UL (ref 150–400)
PLATELET # BLD AUTO: 136 K/UL (ref 150–400)
PLATELET # BLD AUTO: 144 K/UL (ref 150–400)
PLATELET # BLD AUTO: 155 K/UL (ref 150–400)
PLATELET # BLD AUTO: 157 K/UL (ref 150–400)
PLATELET # BLD AUTO: 160 K/UL (ref 150–400)
PLATELET COMMENTS,PCOM: ABNORMAL
PMV BLD AUTO: 11.4 FL (ref 8.9–12.9)
PMV BLD AUTO: 11.4 FL (ref 8.9–12.9)
PMV BLD AUTO: 11.5 FL (ref 8.9–12.9)
PMV BLD AUTO: 11.5 FL (ref 8.9–12.9)
PMV BLD AUTO: 11.8 FL (ref 8.9–12.9)
PMV BLD AUTO: 12 FL (ref 8.9–12.9)
PMV BLD AUTO: 12.3 FL (ref 8.9–12.9)
PMV BLD AUTO: 12.6 FL (ref 8.9–12.9)
PMV BLD AUTO: 12.6 FL (ref 8.9–12.9)
PO2 BLD: 89 MMHG (ref 80–100)
POTASSIUM SERPL-SCNC: 3.5 MMOL/L (ref 3.5–5.1)
POTASSIUM SERPL-SCNC: 3.5 MMOL/L (ref 3.5–5.1)
POTASSIUM SERPL-SCNC: 3.6 MMOL/L (ref 3.5–5.1)
POTASSIUM SERPL-SCNC: 3.7 MMOL/L (ref 3.5–5.1)
POTASSIUM SERPL-SCNC: 3.8 MMOL/L (ref 3.5–5.1)
POTASSIUM SERPL-SCNC: 3.9 MMOL/L (ref 3.5–5.1)
POTASSIUM SERPL-SCNC: 3.9 MMOL/L (ref 3.5–5.1)
PROT SERPL-MCNC: 8.4 G/DL (ref 6.4–8.2)
PROT SERPL-MCNC: 8.6 G/DL (ref 6.4–8.2)
PROT SERPL-MCNC: 8.8 G/DL (ref 6.4–8.2)
PROT SERPL-MCNC: 9 G/DL (ref 6.4–8.2)
PROT UR STRIP-MCNC: 100 MG/DL
Q-T INTERVAL, ECG07: 306 MS
Q-T INTERVAL, ECG07: 350 MS
Q-T INTERVAL, ECG07: 366 MS
QRS DURATION, ECG06: 110 MS
QRS DURATION, ECG06: 120 MS
QRS DURATION, ECG06: 124 MS
QTC CALCULATION (BEZET), ECG08: 404 MS
QTC CALCULATION (BEZET), ECG08: 435 MS
QTC CALCULATION (BEZET), ECG08: 442 MS
RBC # BLD AUTO: 4.14 M/UL (ref 4.1–5.7)
RBC # BLD AUTO: 4.17 M/UL (ref 4.1–5.7)
RBC # BLD AUTO: 4.2 M/UL (ref 4.1–5.7)
RBC # BLD AUTO: 4.3 M/UL (ref 4.1–5.7)
RBC # BLD AUTO: 4.39 M/UL (ref 4.1–5.7)
RBC # BLD AUTO: 4.49 M/UL (ref 4.1–5.7)
RBC # BLD AUTO: 4.52 M/UL (ref 4.1–5.7)
RBC # BLD AUTO: 4.62 M/UL (ref 4.1–5.7)
RBC # BLD AUTO: 4.67 M/UL (ref 4.1–5.7)
RBC #/AREA URNS HPF: ABNORMAL /HPF (ref 0–5)
RBC MORPH BLD: ABNORMAL
RBC MORPH BLD: ABNORMAL
SAO2 % BLD: 97 % (ref 92–97)
SERVICE CMNT-IMP: ABNORMAL
SERVICE CMNT-IMP: NORMAL
SODIUM SERPL-SCNC: 135 MMOL/L (ref 136–145)
SODIUM SERPL-SCNC: 136 MMOL/L (ref 136–145)
SODIUM SERPL-SCNC: 137 MMOL/L (ref 136–145)
SODIUM SERPL-SCNC: 138 MMOL/L (ref 136–145)
SODIUM SERPL-SCNC: 140 MMOL/L (ref 136–145)
SODIUM SERPL-SCNC: 141 MMOL/L (ref 136–145)
SODIUM SERPL-SCNC: 144 MMOL/L (ref 136–145)
SP GR UR REFRACTOMETRY: 1.02 (ref 1–1.03)
SPECIMEN TYPE: ABNORMAL
T4 FREE SERPL-MCNC: 1.1 NG/DL (ref 0.8–1.5)
TROPONIN I SERPL-MCNC: 0.06 NG/ML
TROPONIN I SERPL-MCNC: 0.1 NG/ML
TROPONIN I SERPL-MCNC: <0.04 NG/ML
TROPONIN I SERPL-MCNC: <0.04 NG/ML
TSH SERPL DL<=0.05 MIU/L-ACNC: 1.17 UIU/ML (ref 0.36–3.74)
UR CULT HOLD, URHOLD: NORMAL
UR CULT HOLD, URHOLD: NORMAL
UROBILINOGEN UR QL STRIP.AUTO: 1 EU/DL (ref 0.2–1)
VENTRICULAR RATE, ECG03: 105 BPM
VENTRICULAR RATE, ECG03: 88 BPM
VENTRICULAR RATE, ECG03: 93 BPM
WBC # BLD AUTO: 10.8 K/UL (ref 4.1–11.1)
WBC # BLD AUTO: 3.3 K/UL (ref 4.1–11.1)
WBC # BLD AUTO: 3.3 K/UL (ref 4.1–11.1)
WBC # BLD AUTO: 4.3 K/UL (ref 4.1–11.1)
WBC # BLD AUTO: 4.4 K/UL (ref 4.1–11.1)
WBC # BLD AUTO: 4.5 K/UL (ref 4.1–11.1)
WBC # BLD AUTO: 6.9 K/UL (ref 4.1–11.1)
WBC # BLD AUTO: 7.1 K/UL (ref 4.1–11.1)
WBC # BLD AUTO: 8.2 K/UL (ref 4.1–11.1)
WBC MORPH BLD: ABNORMAL
WBC URNS QL MICRO: ABNORMAL /HPF (ref 0–4)

## 2018-01-01 PROCEDURE — 85025 COMPLETE CBC W/AUTO DIFF WBC: CPT | Performed by: EMERGENCY MEDICINE

## 2018-01-01 PROCEDURE — 87040 BLOOD CULTURE FOR BACTERIA: CPT | Performed by: INTERNAL MEDICINE

## 2018-01-01 PROCEDURE — 74011250637 HC RX REV CODE- 250/637: Performed by: FAMILY MEDICINE

## 2018-01-01 PROCEDURE — 74011250637 HC RX REV CODE- 250/637: Performed by: HOSPITALIST

## 2018-01-01 PROCEDURE — 36415 COLL VENOUS BLD VENIPUNCTURE: CPT | Performed by: HOSPITALIST

## 2018-01-01 PROCEDURE — 65660000000 HC RM CCU STEPDOWN

## 2018-01-01 PROCEDURE — 70450 CT HEAD/BRAIN W/O DYE: CPT

## 2018-01-01 PROCEDURE — 82962 GLUCOSE BLOOD TEST: CPT

## 2018-01-01 PROCEDURE — 85025 COMPLETE CBC W/AUTO DIFF WBC: CPT | Performed by: STUDENT IN AN ORGANIZED HEALTH CARE EDUCATION/TRAINING PROGRAM

## 2018-01-01 PROCEDURE — 0651 HSPC ROUTINE HOME CARE

## 2018-01-01 PROCEDURE — 85025 COMPLETE CBC W/AUTO DIFF WBC: CPT

## 2018-01-01 PROCEDURE — 74011000250 HC RX REV CODE- 250: Performed by: NEUROLOGICAL SURGERY

## 2018-01-01 PROCEDURE — 74011250636 HC RX REV CODE- 250/636: Performed by: EMERGENCY MEDICINE

## 2018-01-01 PROCEDURE — 74011250636 HC RX REV CODE- 250/636: Performed by: INTERNAL MEDICINE

## 2018-01-01 PROCEDURE — 72072 X-RAY EXAM THORAC SPINE 3VWS: CPT

## 2018-01-01 PROCEDURE — 36415 COLL VENOUS BLD VENIPUNCTURE: CPT | Performed by: PHYSICIAN ASSISTANT

## 2018-01-01 PROCEDURE — 74011250636 HC RX REV CODE- 250/636: Performed by: RADIOLOGY

## 2018-01-01 PROCEDURE — 74011000258 HC RX REV CODE- 258: Performed by: FAMILY MEDICINE

## 2018-01-01 PROCEDURE — 71045 X-RAY EXAM CHEST 1 VIEW: CPT

## 2018-01-01 PROCEDURE — 83880 ASSAY OF NATRIURETIC PEPTIDE: CPT | Performed by: HOSPITALIST

## 2018-01-01 PROCEDURE — 74011000250 HC RX REV CODE- 250: Performed by: HOSPITALIST

## 2018-01-01 PROCEDURE — 94640 AIRWAY INHALATION TREATMENT: CPT

## 2018-01-01 PROCEDURE — 81001 URINALYSIS AUTO W/SCOPE: CPT | Performed by: EMERGENCY MEDICINE

## 2018-01-01 PROCEDURE — 85610 PROTHROMBIN TIME: CPT

## 2018-01-01 PROCEDURE — 74011000250 HC RX REV CODE- 250: Performed by: INTERNAL MEDICINE

## 2018-01-01 PROCEDURE — 96376 TX/PRO/DX INJ SAME DRUG ADON: CPT

## 2018-01-01 PROCEDURE — 80162 ASSAY OF DIGOXIN TOTAL: CPT | Performed by: EMERGENCY MEDICINE

## 2018-01-01 PROCEDURE — 74011000250 HC RX REV CODE- 250: Performed by: FAMILY MEDICINE

## 2018-01-01 PROCEDURE — 3336500001 HSPC ELECTION

## 2018-01-01 PROCEDURE — 74011250636 HC RX REV CODE- 250/636: Performed by: HOSPITALIST

## 2018-01-01 PROCEDURE — 99218 HC RM OBSERVATION: CPT

## 2018-01-01 PROCEDURE — 65270000029 HC RM PRIVATE

## 2018-01-01 PROCEDURE — 73060 X-RAY EXAM OF HUMERUS: CPT

## 2018-01-01 PROCEDURE — 85025 COMPLETE CBC W/AUTO DIFF WBC: CPT | Performed by: FAMILY MEDICINE

## 2018-01-01 PROCEDURE — G0299 HHS/HOSPICE OF RN EA 15 MIN: HCPCS

## 2018-01-01 PROCEDURE — 72125 CT NECK SPINE W/O DYE: CPT

## 2018-01-01 PROCEDURE — 74011636637 HC RX REV CODE- 636/637: Performed by: HOSPITALIST

## 2018-01-01 PROCEDURE — 81001 URINALYSIS AUTO W/SCOPE: CPT | Performed by: PHYSICIAN ASSISTANT

## 2018-01-01 PROCEDURE — 99285 EMERGENCY DEPT VISIT HI MDM: CPT

## 2018-01-01 PROCEDURE — 93005 ELECTROCARDIOGRAM TRACING: CPT

## 2018-01-01 PROCEDURE — 97161 PT EVAL LOW COMPLEX 20 MIN: CPT

## 2018-01-01 PROCEDURE — 80048 BASIC METABOLIC PNL TOTAL CA: CPT | Performed by: PHYSICIAN ASSISTANT

## 2018-01-01 PROCEDURE — 71250 CT THORAX DX C-: CPT

## 2018-01-01 PROCEDURE — 74011250636 HC RX REV CODE- 250/636: Performed by: NEUROLOGICAL SURGERY

## 2018-01-01 PROCEDURE — 74011250636 HC RX REV CODE- 250/636: Performed by: FAMILY MEDICINE

## 2018-01-01 PROCEDURE — 0656 HSPC GENERAL INPATIENT

## 2018-01-01 PROCEDURE — 77010033678 HC OXYGEN DAILY

## 2018-01-01 PROCEDURE — 74011250637 HC RX REV CODE- 250/637: Performed by: INTERNAL MEDICINE

## 2018-01-01 PROCEDURE — 80053 COMPREHEN METABOLIC PANEL: CPT | Performed by: STUDENT IN AN ORGANIZED HEALTH CARE EDUCATION/TRAINING PROGRAM

## 2018-01-01 PROCEDURE — 74011250637 HC RX REV CODE- 250/637: Performed by: SPECIALIST

## 2018-01-01 PROCEDURE — 36415 COLL VENOUS BLD VENIPUNCTURE: CPT | Performed by: INTERNAL MEDICINE

## 2018-01-01 PROCEDURE — 97530 THERAPEUTIC ACTIVITIES: CPT | Performed by: PHYSICAL THERAPIST

## 2018-01-01 PROCEDURE — 83735 ASSAY OF MAGNESIUM: CPT | Performed by: STUDENT IN AN ORGANIZED HEALTH CARE EDUCATION/TRAINING PROGRAM

## 2018-01-01 PROCEDURE — 83605 ASSAY OF LACTIC ACID: CPT | Performed by: EMERGENCY MEDICINE

## 2018-01-01 PROCEDURE — 80053 COMPREHEN METABOLIC PANEL: CPT | Performed by: FAMILY MEDICINE

## 2018-01-01 PROCEDURE — 74011000258 HC RX REV CODE- 258: Performed by: INTERNAL MEDICINE

## 2018-01-01 PROCEDURE — 85025 COMPLETE CBC W/AUTO DIFF WBC: CPT | Performed by: PHYSICIAN ASSISTANT

## 2018-01-01 PROCEDURE — 77030010547 HC BG URIN W/UMETER -A

## 2018-01-01 PROCEDURE — 80162 ASSAY OF DIGOXIN TOTAL: CPT | Performed by: FAMILY MEDICINE

## 2018-01-01 PROCEDURE — 74176 CT ABD & PELVIS W/O CONTRAST: CPT

## 2018-01-01 PROCEDURE — 85027 COMPLETE CBC AUTOMATED: CPT | Performed by: HOSPITALIST

## 2018-01-01 PROCEDURE — 36415 COLL VENOUS BLD VENIPUNCTURE: CPT | Performed by: FAMILY MEDICINE

## 2018-01-01 PROCEDURE — 80048 BASIC METABOLIC PNL TOTAL CA: CPT | Performed by: HOSPITALIST

## 2018-01-01 PROCEDURE — 94664 DEMO&/EVAL PT USE INHALER: CPT

## 2018-01-01 PROCEDURE — 74011250636 HC RX REV CODE- 250/636

## 2018-01-01 PROCEDURE — 36600 WITHDRAWAL OF ARTERIAL BLOOD: CPT

## 2018-01-01 PROCEDURE — 74011250636 HC RX REV CODE- 250/636: Performed by: NURSE PRACTITIONER

## 2018-01-01 PROCEDURE — 70553 MRI BRAIN STEM W/O & W/DYE: CPT

## 2018-01-01 PROCEDURE — 80053 COMPREHEN METABOLIC PANEL: CPT | Performed by: HOSPITALIST

## 2018-01-01 PROCEDURE — 84484 ASSAY OF TROPONIN QUANT: CPT | Performed by: PHYSICIAN ASSISTANT

## 2018-01-01 PROCEDURE — 83605 ASSAY OF LACTIC ACID: CPT | Performed by: STUDENT IN AN ORGANIZED HEALTH CARE EDUCATION/TRAINING PROGRAM

## 2018-01-01 PROCEDURE — G8979 MOBILITY GOAL STATUS: HCPCS

## 2018-01-01 PROCEDURE — 84484 ASSAY OF TROPONIN QUANT: CPT | Performed by: HOSPITALIST

## 2018-01-01 PROCEDURE — 65610000006 HC RM INTENSIVE CARE

## 2018-01-01 PROCEDURE — 99233 SBSQ HOSP IP/OBS HIGH 50: CPT | Performed by: PHYSICAL MEDICINE & REHABILITATION

## 2018-01-01 PROCEDURE — 84443 ASSAY THYROID STIM HORMONE: CPT | Performed by: EMERGENCY MEDICINE

## 2018-01-01 PROCEDURE — 80053 COMPREHEN METABOLIC PANEL: CPT | Performed by: EMERGENCY MEDICINE

## 2018-01-01 PROCEDURE — 93306 TTE W/DOPPLER COMPLETE: CPT

## 2018-01-01 PROCEDURE — 36415 COLL VENOUS BLD VENIPUNCTURE: CPT | Performed by: EMERGENCY MEDICINE

## 2018-01-01 PROCEDURE — G8978 MOBILITY CURRENT STATUS: HCPCS

## 2018-01-01 PROCEDURE — 96361 HYDRATE IV INFUSION ADD-ON: CPT

## 2018-01-01 PROCEDURE — 87804 INFLUENZA ASSAY W/OPTIC: CPT | Performed by: EMERGENCY MEDICINE

## 2018-01-01 PROCEDURE — 84439 ASSAY OF FREE THYROXINE: CPT | Performed by: EMERGENCY MEDICINE

## 2018-01-01 PROCEDURE — A9585 GADOBUTROL INJECTION: HCPCS | Performed by: RADIOLOGY

## 2018-01-01 PROCEDURE — 82803 BLOOD GASES ANY COMBINATION: CPT

## 2018-01-01 PROCEDURE — 87040 BLOOD CULTURE FOR BACTERIA: CPT | Performed by: EMERGENCY MEDICINE

## 2018-01-01 PROCEDURE — 74011250636 HC RX REV CODE- 250/636: Performed by: STUDENT IN AN ORGANIZED HEALTH CARE EDUCATION/TRAINING PROGRAM

## 2018-01-01 PROCEDURE — 84484 ASSAY OF TROPONIN QUANT: CPT | Performed by: FAMILY MEDICINE

## 2018-01-01 PROCEDURE — 74011000258 HC RX REV CODE- 258: Performed by: NURSE PRACTITIONER

## 2018-01-01 PROCEDURE — 80048 BASIC METABOLIC PNL TOTAL CA: CPT

## 2018-01-01 PROCEDURE — 84484 ASSAY OF TROPONIN QUANT: CPT | Performed by: STUDENT IN AN ORGANIZED HEALTH CARE EDUCATION/TRAINING PROGRAM

## 2018-01-01 PROCEDURE — P9612 CATHETERIZE FOR URINE SPEC: HCPCS

## 2018-01-01 PROCEDURE — 97116 GAIT TRAINING THERAPY: CPT

## 2018-01-01 PROCEDURE — 97165 OT EVAL LOW COMPLEX 30 MIN: CPT

## 2018-01-01 PROCEDURE — 97162 PT EVAL MOD COMPLEX 30 MIN: CPT

## 2018-01-01 PROCEDURE — 96375 TX/PRO/DX INJ NEW DRUG ADDON: CPT

## 2018-01-01 PROCEDURE — 74011250636 HC RX REV CODE- 250/636: Performed by: PHYSICAL MEDICINE & REHABILITATION

## 2018-01-01 PROCEDURE — 77030029684 HC NEB SM VOL KT MONA -A

## 2018-01-01 PROCEDURE — 83036 HEMOGLOBIN GLYCOSYLATED A1C: CPT | Performed by: HOSPITALIST

## 2018-01-01 PROCEDURE — 96374 THER/PROPH/DIAG INJ IV PUSH: CPT

## 2018-01-01 PROCEDURE — 76450000000

## 2018-01-01 PROCEDURE — 97116 GAIT TRAINING THERAPY: CPT | Performed by: PHYSICAL THERAPIST

## 2018-01-01 PROCEDURE — 74011000250 HC RX REV CODE- 250

## 2018-01-01 PROCEDURE — 96372 THER/PROPH/DIAG INJ SC/IM: CPT

## 2018-01-01 PROCEDURE — 81001 URINALYSIS AUTO W/SCOPE: CPT | Performed by: STUDENT IN AN ORGANIZED HEALTH CARE EDUCATION/TRAINING PROGRAM

## 2018-01-01 PROCEDURE — 70544 MR ANGIOGRAPHY HEAD W/O DYE: CPT

## 2018-01-01 PROCEDURE — 36415 COLL VENOUS BLD VENIPUNCTURE: CPT

## 2018-01-01 PROCEDURE — 97535 SELF CARE MNGMENT TRAINING: CPT

## 2018-01-01 RX ORDER — IPRATROPIUM BROMIDE 0.5 MG/2.5ML
0.5 SOLUTION RESPIRATORY (INHALATION)
Status: DISCONTINUED | OUTPATIENT
Start: 2018-01-01 | End: 2018-01-01

## 2018-01-01 RX ORDER — PREGABALIN 100 MG/1
400 CAPSULE ORAL
Status: DISCONTINUED | OUTPATIENT
Start: 2018-01-01 | End: 2018-01-01 | Stop reason: HOSPADM

## 2018-01-01 RX ORDER — LORAZEPAM 2 MG/ML
INJECTION INTRAMUSCULAR
Status: COMPLETED
Start: 2018-01-01 | End: 2018-01-01

## 2018-01-01 RX ORDER — SIMVASTATIN 20 MG/1
20 TABLET, FILM COATED ORAL
Status: DISCONTINUED | OUTPATIENT
Start: 2018-01-01 | End: 2018-01-01 | Stop reason: HOSPADM

## 2018-01-01 RX ORDER — FINASTERIDE 5 MG/1
5 TABLET, FILM COATED ORAL DAILY
Status: DISCONTINUED | OUTPATIENT
Start: 2018-01-01 | End: 2018-01-01 | Stop reason: HOSPADM

## 2018-01-01 RX ORDER — ENOXAPARIN SODIUM 100 MG/ML
40 INJECTION SUBCUTANEOUS EVERY 24 HOURS
Status: DISCONTINUED | OUTPATIENT
Start: 2018-01-01 | End: 2018-01-01 | Stop reason: HOSPADM

## 2018-01-01 RX ORDER — LORAZEPAM 2 MG/ML
1 INJECTION INTRAMUSCULAR
Status: COMPLETED | OUTPATIENT
Start: 2018-01-01 | End: 2018-01-01

## 2018-01-01 RX ORDER — HYDROMORPHONE HYDROCHLORIDE 1 MG/ML
1 INJECTION, SOLUTION INTRAMUSCULAR; INTRAVENOUS; SUBCUTANEOUS EVERY 4 HOURS
Status: DISCONTINUED | OUTPATIENT
Start: 2018-01-01 | End: 2018-03-12 | Stop reason: HOSPADM

## 2018-01-01 RX ORDER — DILTIAZEM HCL/D5W 125 MG/125
0-15 PLASTIC BAG, INJECTION (ML) INTRAVENOUS
Status: DISCONTINUED | OUTPATIENT
Start: 2018-01-01 | End: 2018-01-01 | Stop reason: SDUPTHER

## 2018-01-01 RX ORDER — KETOROLAC TROMETHAMINE 30 MG/ML
30 INJECTION, SOLUTION INTRAMUSCULAR; INTRAVENOUS
Status: ACTIVE | OUTPATIENT
Start: 2018-01-01 | End: 2018-01-01

## 2018-01-01 RX ORDER — DIGOXIN 125 MCG
0.12 TABLET ORAL DAILY
Status: DISCONTINUED | OUTPATIENT
Start: 2018-01-01 | End: 2018-01-01 | Stop reason: HOSPADM

## 2018-01-01 RX ORDER — CLINDAMYCIN PHOSPHATE 600 MG/50ML
600 INJECTION INTRAVENOUS EVERY 8 HOURS
Status: DISCONTINUED | OUTPATIENT
Start: 2018-01-01 | End: 2018-01-01

## 2018-01-01 RX ORDER — OSELTAMIVIR PHOSPHATE 75 MG/1
75 CAPSULE ORAL 2 TIMES DAILY
Status: DISCONTINUED | OUTPATIENT
Start: 2018-01-01 | End: 2018-01-01 | Stop reason: DRUGHIGH

## 2018-01-01 RX ORDER — IPRATROPIUM BROMIDE AND ALBUTEROL SULFATE 2.5; .5 MG/3ML; MG/3ML
SOLUTION RESPIRATORY (INHALATION)
Status: COMPLETED
Start: 2018-01-01 | End: 2018-01-01

## 2018-01-01 RX ORDER — SODIUM CHLORIDE 9 MG/ML
75 INJECTION, SOLUTION INTRAVENOUS CONTINUOUS
Status: DISCONTINUED | OUTPATIENT
Start: 2018-01-01 | End: 2018-01-01

## 2018-01-01 RX ORDER — MIDAZOLAM HYDROCHLORIDE 1 MG/ML
2 INJECTION, SOLUTION INTRAMUSCULAR; INTRAVENOUS
Status: DISCONTINUED | OUTPATIENT
Start: 2018-01-01 | End: 2018-01-01 | Stop reason: SDUPTHER

## 2018-01-01 RX ORDER — LORAZEPAM 2 MG/ML
1.5 INJECTION INTRAMUSCULAR
Status: DISCONTINUED | OUTPATIENT
Start: 2018-01-01 | End: 2018-03-12 | Stop reason: HOSPADM

## 2018-01-01 RX ORDER — TAMSULOSIN HYDROCHLORIDE 0.4 MG/1
0.4 CAPSULE ORAL
Status: DISCONTINUED | OUTPATIENT
Start: 2018-01-01 | End: 2018-01-01 | Stop reason: HOSPADM

## 2018-01-01 RX ORDER — AMLODIPINE BESYLATE 5 MG/1
5 TABLET ORAL DAILY
Status: DISCONTINUED | OUTPATIENT
Start: 2018-01-01 | End: 2018-01-01

## 2018-01-01 RX ORDER — GLYCOPYRROLATE 0.2 MG/ML
0.2 INJECTION INTRAMUSCULAR; INTRAVENOUS
Status: DISCONTINUED | OUTPATIENT
Start: 2018-01-01 | End: 2018-01-01 | Stop reason: HOSPADM

## 2018-01-01 RX ORDER — ASPIRIN 81 MG/1
81 TABLET ORAL DAILY
Status: DISCONTINUED | OUTPATIENT
Start: 2018-01-01 | End: 2018-01-01 | Stop reason: SDUPTHER

## 2018-01-01 RX ORDER — DILTIAZEM HYDROCHLORIDE 30 MG/1
90 TABLET, FILM COATED ORAL
Status: DISCONTINUED | OUTPATIENT
Start: 2018-01-01 | End: 2018-01-01

## 2018-01-01 RX ORDER — HYDROMORPHONE HYDROCHLORIDE 1 MG/ML
0.5 INJECTION, SOLUTION INTRAMUSCULAR; INTRAVENOUS; SUBCUTANEOUS EVERY 4 HOURS
Status: DISCONTINUED | OUTPATIENT
Start: 2018-01-01 | End: 2018-01-01

## 2018-01-01 RX ORDER — HYDROMORPHONE HYDROCHLORIDE 1 MG/ML
0.5 INJECTION, SOLUTION INTRAMUSCULAR; INTRAVENOUS; SUBCUTANEOUS
Status: DISCONTINUED | OUTPATIENT
Start: 2018-01-01 | End: 2018-01-01 | Stop reason: HOSPADM

## 2018-01-01 RX ORDER — IPRATROPIUM BROMIDE 0.5 MG/2.5ML
0.5 SOLUTION RESPIRATORY (INHALATION)
Qty: 30 VIAL | Refills: 0 | Status: SHIPPED
Start: 2018-01-01

## 2018-01-01 RX ORDER — DIGOXIN 125 MCG
0.12 TABLET ORAL
COMMUNITY

## 2018-01-01 RX ORDER — FUROSEMIDE 10 MG/ML
40 INJECTION INTRAMUSCULAR; INTRAVENOUS ONCE
Status: DISCONTINUED | OUTPATIENT
Start: 2018-01-01 | End: 2018-01-01

## 2018-01-01 RX ORDER — DULOXETIN HYDROCHLORIDE 30 MG/1
30 CAPSULE, DELAYED RELEASE ORAL
Status: DISCONTINUED | OUTPATIENT
Start: 2018-01-01 | End: 2018-01-01 | Stop reason: HOSPADM

## 2018-01-01 RX ORDER — GLIPIZIDE 2.5 MG/1
2.5 TABLET, EXTENDED RELEASE ORAL DAILY
Status: DISCONTINUED | OUTPATIENT
Start: 2018-01-01 | End: 2018-01-01

## 2018-01-01 RX ORDER — SODIUM CHLORIDE 0.9 % (FLUSH) 0.9 %
5-10 SYRINGE (ML) INJECTION EVERY 8 HOURS
Status: DISCONTINUED | OUTPATIENT
Start: 2018-01-01 | End: 2018-01-01 | Stop reason: HOSPADM

## 2018-01-01 RX ORDER — OMEPRAZOLE 20 MG/1
20 CAPSULE, DELAYED RELEASE ORAL
Status: DISCONTINUED | OUTPATIENT
Start: 2018-01-01 | End: 2018-01-01 | Stop reason: CLARIF

## 2018-01-01 RX ORDER — FUROSEMIDE 10 MG/ML
60 INJECTION INTRAMUSCULAR; INTRAVENOUS ONCE
Status: COMPLETED | OUTPATIENT
Start: 2018-01-01 | End: 2018-01-01

## 2018-01-01 RX ORDER — ACETAMINOPHEN 650 MG/1
650 SUPPOSITORY RECTAL
Status: DISCONTINUED | OUTPATIENT
Start: 2018-01-01 | End: 2018-01-01 | Stop reason: HOSPADM

## 2018-01-01 RX ORDER — INSULIN LISPRO 100 [IU]/ML
INJECTION, SOLUTION INTRAVENOUS; SUBCUTANEOUS EVERY 6 HOURS
Status: DISCONTINUED | OUTPATIENT
Start: 2018-01-01 | End: 2018-01-01

## 2018-01-01 RX ORDER — IPRATROPIUM BROMIDE AND ALBUTEROL SULFATE 2.5; .5 MG/3ML; MG/3ML
3 SOLUTION RESPIRATORY (INHALATION)
Status: DISCONTINUED | OUTPATIENT
Start: 2018-01-01 | End: 2018-01-01

## 2018-01-01 RX ORDER — HALOPERIDOL 5 MG/ML
5 INJECTION INTRAMUSCULAR
Status: COMPLETED | OUTPATIENT
Start: 2018-01-01 | End: 2018-01-01

## 2018-01-01 RX ORDER — HALOPERIDOL 5 MG/ML
2 INJECTION INTRAMUSCULAR
Status: DISCONTINUED | OUTPATIENT
Start: 2018-01-01 | End: 2018-01-01 | Stop reason: HOSPADM

## 2018-01-01 RX ORDER — HYDROMORPHONE HYDROCHLORIDE 2 MG/ML
1.5 INJECTION, SOLUTION INTRAMUSCULAR; INTRAVENOUS; SUBCUTANEOUS ONCE
Status: COMPLETED | OUTPATIENT
Start: 2018-01-01 | End: 2018-01-01

## 2018-01-01 RX ORDER — LORAZEPAM 2 MG/ML
1 INJECTION INTRAMUSCULAR
Status: DISCONTINUED | OUTPATIENT
Start: 2018-01-01 | End: 2018-01-01

## 2018-01-01 RX ORDER — FUROSEMIDE 10 MG/ML
40 INJECTION INTRAMUSCULAR; INTRAVENOUS
Status: COMPLETED | OUTPATIENT
Start: 2018-01-01 | End: 2018-01-01

## 2018-01-01 RX ORDER — LEVOTHYROXINE SODIUM 100 UG/1
100 TABLET ORAL
Status: DISCONTINUED | OUTPATIENT
Start: 2018-01-01 | End: 2018-01-01 | Stop reason: HOSPADM

## 2018-01-01 RX ORDER — HYDRALAZINE HYDROCHLORIDE 25 MG/1
25 TABLET, FILM COATED ORAL
COMMUNITY

## 2018-01-01 RX ORDER — DILTIAZEM HYDROCHLORIDE 120 MG/1
120 CAPSULE, COATED, EXTENDED RELEASE ORAL DAILY
Status: DISCONTINUED | OUTPATIENT
Start: 2018-01-01 | End: 2018-01-01 | Stop reason: HOSPADM

## 2018-01-01 RX ORDER — HYDRALAZINE HYDROCHLORIDE 20 MG/ML
20 INJECTION INTRAMUSCULAR; INTRAVENOUS ONCE
Status: DISCONTINUED | OUTPATIENT
Start: 2018-01-01 | End: 2018-01-01

## 2018-01-01 RX ORDER — ACETAMINOPHEN 325 MG/1
650 TABLET ORAL
Status: DISCONTINUED | OUTPATIENT
Start: 2018-01-01 | End: 2018-01-01 | Stop reason: HOSPADM

## 2018-01-01 RX ORDER — MANNITOL 20 G/100ML
0.5 INJECTION, SOLUTION INTRAVENOUS ONCE
Status: DISCONTINUED | OUTPATIENT
Start: 2018-01-01 | End: 2018-01-01

## 2018-01-01 RX ORDER — ACETAMINOPHEN 325 MG/1
650 TABLET ORAL
COMMUNITY

## 2018-01-01 RX ORDER — DILTIAZEM HYDROCHLORIDE 30 MG/1
30 TABLET, FILM COATED ORAL
Status: DISCONTINUED | OUTPATIENT
Start: 2018-01-01 | End: 2018-01-01

## 2018-01-01 RX ORDER — LEVOFLOXACIN 5 MG/ML
750 INJECTION, SOLUTION INTRAVENOUS
Status: DISCONTINUED | OUTPATIENT
Start: 2018-01-01 | End: 2018-01-01

## 2018-01-01 RX ORDER — DILTIAZEM HYDROCHLORIDE 120 MG/1
120 CAPSULE, COATED, EXTENDED RELEASE ORAL DAILY
Status: DISCONTINUED | OUTPATIENT
Start: 2018-01-01 | End: 2018-01-01

## 2018-01-01 RX ORDER — OSELTAMIVIR PHOSPHATE 6 MG/ML
30 FOR SUSPENSION ORAL 2 TIMES DAILY
Status: COMPLETED | OUTPATIENT
Start: 2018-01-01 | End: 2018-01-01

## 2018-01-01 RX ORDER — DILTIAZEM HYDROCHLORIDE 180 MG/1
180 CAPSULE, COATED, EXTENDED RELEASE ORAL DAILY
Status: DISCONTINUED | OUTPATIENT
Start: 2018-01-01 | End: 2018-01-01

## 2018-01-01 RX ORDER — DEXTROSE 50 % IN WATER (D50W) INTRAVENOUS SYRINGE
12.5-25 AS NEEDED
Status: DISCONTINUED | OUTPATIENT
Start: 2018-01-01 | End: 2018-01-01 | Stop reason: HOSPADM

## 2018-01-01 RX ORDER — ONDANSETRON 2 MG/ML
4 INJECTION INTRAMUSCULAR; INTRAVENOUS
Status: DISCONTINUED | OUTPATIENT
Start: 2018-01-01 | End: 2018-01-01 | Stop reason: HOSPADM

## 2018-01-01 RX ORDER — ACETAMINOPHEN 650 MG/1
650 SUPPOSITORY RECTAL
Status: DISCONTINUED | OUTPATIENT
Start: 2018-01-01 | End: 2018-03-12 | Stop reason: HOSPADM

## 2018-01-01 RX ORDER — LORAZEPAM 2 MG/ML
1 INJECTION INTRAMUSCULAR EVERY 4 HOURS
Status: DISCONTINUED | OUTPATIENT
Start: 2018-01-01 | End: 2018-01-01

## 2018-01-01 RX ORDER — HYDRALAZINE HYDROCHLORIDE 20 MG/ML
20 INJECTION INTRAMUSCULAR; INTRAVENOUS
Status: DISCONTINUED | OUTPATIENT
Start: 2018-01-01 | End: 2018-01-01 | Stop reason: HOSPADM

## 2018-01-01 RX ORDER — LORAZEPAM 2 MG/ML
1 INJECTION INTRAMUSCULAR
Status: DISCONTINUED | OUTPATIENT
Start: 2018-01-01 | End: 2018-01-01 | Stop reason: HOSPADM

## 2018-01-01 RX ORDER — IPRATROPIUM BROMIDE 0.5 MG/2.5ML
0.5 SOLUTION RESPIRATORY (INHALATION)
Status: DISCONTINUED | OUTPATIENT
Start: 2018-01-01 | End: 2018-01-01 | Stop reason: HOSPADM

## 2018-01-01 RX ORDER — RANITIDINE 300 MG/1
300 TABLET ORAL
Status: DISCONTINUED | OUTPATIENT
Start: 2018-01-01 | End: 2018-01-01 | Stop reason: CLARIF

## 2018-01-01 RX ORDER — KETOROLAC TROMETHAMINE 30 MG/ML
15 INJECTION, SOLUTION INTRAMUSCULAR; INTRAVENOUS
Status: DISCONTINUED | OUTPATIENT
Start: 2018-01-01 | End: 2018-03-12 | Stop reason: HOSPADM

## 2018-01-01 RX ORDER — PRAMIPEXOLE DIHYDROCHLORIDE 0.25 MG/1
0.12 TABLET ORAL
Status: DISCONTINUED | OUTPATIENT
Start: 2018-01-01 | End: 2018-01-01 | Stop reason: HOSPADM

## 2018-01-01 RX ORDER — LORAZEPAM 2 MG/ML
2 INJECTION INTRAMUSCULAR EVERY 4 HOURS
Status: DISCONTINUED | OUTPATIENT
Start: 2018-01-01 | End: 2018-01-01

## 2018-01-01 RX ORDER — FUROSEMIDE 40 MG/1
40 TABLET ORAL DAILY
Status: COMPLETED | OUTPATIENT
Start: 2018-01-01 | End: 2018-01-01

## 2018-01-01 RX ORDER — LORAZEPAM 2 MG/ML
2 INJECTION INTRAMUSCULAR
Status: COMPLETED | OUTPATIENT
Start: 2018-01-01 | End: 2018-01-01

## 2018-01-01 RX ORDER — MIDAZOLAM HYDROCHLORIDE 1 MG/ML
2 INJECTION, SOLUTION INTRAMUSCULAR; INTRAVENOUS
Status: COMPLETED | OUTPATIENT
Start: 2018-01-01 | End: 2018-01-01

## 2018-01-01 RX ORDER — LORAZEPAM 2 MG/ML
3 INJECTION INTRAMUSCULAR EVERY 4 HOURS
Status: DISCONTINUED | OUTPATIENT
Start: 2018-01-01 | End: 2018-03-12 | Stop reason: HOSPADM

## 2018-01-01 RX ORDER — HYDROMORPHONE HYDROCHLORIDE 1 MG/ML
0.5 INJECTION, SOLUTION INTRAMUSCULAR; INTRAVENOUS; SUBCUTANEOUS
Status: DISCONTINUED | OUTPATIENT
Start: 2018-01-01 | End: 2018-01-01

## 2018-01-01 RX ORDER — MAGNESIUM SULFATE 100 %
4 CRYSTALS MISCELLANEOUS AS NEEDED
Status: DISCONTINUED | OUTPATIENT
Start: 2018-01-01 | End: 2018-01-01 | Stop reason: HOSPADM

## 2018-01-01 RX ORDER — SODIUM CHLORIDE 0.9 % (FLUSH) 0.9 %
5 SYRINGE (ML) INJECTION AS NEEDED
Status: DISCONTINUED | OUTPATIENT
Start: 2018-01-01 | End: 2018-03-12 | Stop reason: HOSPADM

## 2018-01-01 RX ORDER — PANTOPRAZOLE SODIUM 40 MG/1
40 TABLET, DELAYED RELEASE ORAL
Status: DISCONTINUED | OUTPATIENT
Start: 2018-01-01 | End: 2018-01-01 | Stop reason: HOSPADM

## 2018-01-01 RX ORDER — FACIAL-BODY WIPES
10 EACH TOPICAL DAILY PRN
Status: DISCONTINUED | OUTPATIENT
Start: 2018-01-01 | End: 2018-03-12 | Stop reason: HOSPADM

## 2018-01-01 RX ORDER — KETOROLAC TROMETHAMINE 30 MG/ML
30 INJECTION, SOLUTION INTRAMUSCULAR; INTRAVENOUS
Status: DISCONTINUED | OUTPATIENT
Start: 2018-01-01 | End: 2018-01-01 | Stop reason: HOSPADM

## 2018-01-01 RX ORDER — HYDROMORPHONE HYDROCHLORIDE 1 MG/ML
1 INJECTION, SOLUTION INTRAMUSCULAR; INTRAVENOUS; SUBCUTANEOUS EVERY 4 HOURS
Status: DISCONTINUED | OUTPATIENT
Start: 2018-01-01 | End: 2018-01-01

## 2018-01-01 RX ORDER — HALOPERIDOL 2 MG/ML
2 SOLUTION ORAL
Status: DISCONTINUED | OUTPATIENT
Start: 2018-01-01 | End: 2018-01-01 | Stop reason: HOSPADM

## 2018-01-01 RX ORDER — INSULIN LISPRO 100 [IU]/ML
INJECTION, SOLUTION INTRAVENOUS; SUBCUTANEOUS
Status: DISCONTINUED | OUTPATIENT
Start: 2018-01-01 | End: 2018-01-01 | Stop reason: HOSPADM

## 2018-01-01 RX ORDER — SODIUM CHLORIDE 0.9 % (FLUSH) 0.9 %
5-10 SYRINGE (ML) INJECTION AS NEEDED
Status: DISCONTINUED | OUTPATIENT
Start: 2018-01-01 | End: 2018-01-01 | Stop reason: HOSPADM

## 2018-01-01 RX ORDER — IPRATROPIUM BROMIDE AND ALBUTEROL SULFATE 2.5; .5 MG/3ML; MG/3ML
3 SOLUTION RESPIRATORY (INHALATION)
Status: DISCONTINUED | OUTPATIENT
Start: 2018-01-01 | End: 2018-01-01 | Stop reason: HOSPADM

## 2018-01-01 RX ORDER — DILTIAZEM HYDROCHLORIDE 5 MG/ML
10 INJECTION INTRAVENOUS ONCE
Status: COMPLETED | OUTPATIENT
Start: 2018-01-01 | End: 2018-01-01

## 2018-01-01 RX ORDER — GLYCOPYRROLATE 0.2 MG/ML
0.2 INJECTION INTRAMUSCULAR; INTRAVENOUS
Status: DISCONTINUED | OUTPATIENT
Start: 2018-01-01 | End: 2018-01-01

## 2018-01-01 RX ORDER — MORPHINE SULFATE 2 MG/ML
2 INJECTION, SOLUTION INTRAMUSCULAR; INTRAVENOUS
Status: DISCONTINUED | OUTPATIENT
Start: 2018-01-01 | End: 2018-01-01

## 2018-01-01 RX ORDER — SCOLOPAMINE TRANSDERMAL SYSTEM 1 MG/1
1 PATCH, EXTENDED RELEASE TRANSDERMAL
Status: DISCONTINUED | OUTPATIENT
Start: 2018-01-01 | End: 2018-03-12 | Stop reason: HOSPADM

## 2018-01-01 RX ORDER — FAMOTIDINE 20 MG/1
40 TABLET, FILM COATED ORAL
Status: DISCONTINUED | OUTPATIENT
Start: 2018-01-01 | End: 2018-01-01 | Stop reason: HOSPADM

## 2018-01-01 RX ORDER — HYDROMORPHONE HYDROCHLORIDE 1 MG/ML
1 INJECTION, SOLUTION INTRAMUSCULAR; INTRAVENOUS; SUBCUTANEOUS
Status: DISCONTINUED | OUTPATIENT
Start: 2018-01-01 | End: 2018-03-12 | Stop reason: HOSPADM

## 2018-01-01 RX ORDER — GUAIFENESIN 100 MG/5ML
81 LIQUID (ML) ORAL DAILY
Status: DISCONTINUED | OUTPATIENT
Start: 2018-01-01 | End: 2018-01-01 | Stop reason: HOSPADM

## 2018-01-01 RX ORDER — HYDRALAZINE HYDROCHLORIDE 20 MG/ML
10 INJECTION INTRAMUSCULAR; INTRAVENOUS
Status: DISCONTINUED | OUTPATIENT
Start: 2018-01-01 | End: 2018-01-01

## 2018-01-01 RX ADMIN — IPRATROPIUM BROMIDE 0.5 MG: 0.5 SOLUTION RESPIRATORY (INHALATION) at 21:47

## 2018-01-01 RX ADMIN — ASPIRIN 81 MG 81 MG: 81 TABLET ORAL at 10:01

## 2018-01-01 RX ADMIN — DILTIAZEM HYDROCHLORIDE 90 MG: 30 TABLET, FILM COATED ORAL at 21:31

## 2018-01-01 RX ADMIN — FINASTERIDE 5 MG: 5 TABLET, FILM COATED ORAL at 09:01

## 2018-01-01 RX ADMIN — IPRATROPIUM BROMIDE AND ALBUTEROL SULFATE 3 ML: .5; 3 SOLUTION RESPIRATORY (INHALATION) at 00:28

## 2018-01-01 RX ADMIN — LEVETIRACETAM 1000 MG: 100 INJECTION, SOLUTION INTRAVENOUS at 14:34

## 2018-01-01 RX ADMIN — LORAZEPAM 2 MG: 2 INJECTION INTRAMUSCULAR; INTRAVENOUS at 07:00

## 2018-01-01 RX ADMIN — SODIUM CHLORIDE 5 MG/HR: 900 INJECTION, SOLUTION INTRAVENOUS at 05:41

## 2018-01-01 RX ADMIN — DILTIAZEM HYDROCHLORIDE 30 MG: 30 TABLET, FILM COATED ORAL at 22:50

## 2018-01-01 RX ADMIN — FAMOTIDINE 20 MG: 10 INJECTION, SOLUTION INTRAVENOUS at 09:03

## 2018-01-01 RX ADMIN — HYDRALAZINE HYDROCHLORIDE 10 MG: 20 INJECTION INTRAMUSCULAR; INTRAVENOUS at 22:31

## 2018-01-01 RX ADMIN — CEFEPIME HYDROCHLORIDE 2 G: 2 INJECTION, POWDER, FOR SOLUTION INTRAVENOUS at 19:22

## 2018-01-01 RX ADMIN — FAMOTIDINE 20 MG: 10 INJECTION, SOLUTION INTRAVENOUS at 22:17

## 2018-01-01 RX ADMIN — LEVOTHYROXINE SODIUM 100 MCG: 100 TABLET ORAL at 06:41

## 2018-01-01 RX ADMIN — FAMOTIDINE 40 MG: 20 TABLET, FILM COATED ORAL at 22:34

## 2018-01-01 RX ADMIN — DILTIAZEM HYDROCHLORIDE 30 MG: 30 TABLET, FILM COATED ORAL at 09:23

## 2018-01-01 RX ADMIN — TAMSULOSIN HYDROCHLORIDE 0.4 MG: 0.4 CAPSULE ORAL at 21:31

## 2018-01-01 RX ADMIN — HYDROMORPHONE HYDROCHLORIDE 0.5 MG: 1 INJECTION, SOLUTION INTRAMUSCULAR; INTRAVENOUS; SUBCUTANEOUS at 23:47

## 2018-01-01 RX ADMIN — IPRATROPIUM BROMIDE AND ALBUTEROL SULFATE 3 ML: .5; 3 SOLUTION RESPIRATORY (INHALATION) at 21:25

## 2018-01-01 RX ADMIN — LORAZEPAM 2 MG: 2 INJECTION INTRAMUSCULAR; INTRAVENOUS at 14:35

## 2018-01-01 RX ADMIN — HYDROMORPHONE HYDROCHLORIDE 1 MG: 1 INJECTION, SOLUTION INTRAMUSCULAR; INTRAVENOUS; SUBCUTANEOUS at 06:55

## 2018-01-01 RX ADMIN — IPRATROPIUM BROMIDE 0.5 MG: 0.5 SOLUTION RESPIRATORY (INHALATION) at 08:39

## 2018-01-01 RX ADMIN — OSELTAMIVIR PHOSPHATE 30 MG: 6 POWDER, FOR SUSPENSION ORAL at 18:54

## 2018-01-01 RX ADMIN — INSULIN LISPRO 2 UNITS: 100 INJECTION, SOLUTION INTRAVENOUS; SUBCUTANEOUS at 14:37

## 2018-01-01 RX ADMIN — LEVETIRACETAM 1000 MG: 100 INJECTION, SOLUTION INTRAVENOUS at 02:09

## 2018-01-01 RX ADMIN — IPRATROPIUM BROMIDE AND ALBUTEROL SULFATE 3 ML: .5; 3 SOLUTION RESPIRATORY (INHALATION) at 04:16

## 2018-01-01 RX ADMIN — LEVOTHYROXINE SODIUM 100 MCG: 100 TABLET ORAL at 07:23

## 2018-01-01 RX ADMIN — FUROSEMIDE 40 MG: 10 INJECTION, SOLUTION INTRAVENOUS at 01:44

## 2018-01-01 RX ADMIN — DULOXETINE HYDROCHLORIDE 30 MG: 30 CAPSULE, DELAYED RELEASE ORAL at 22:35

## 2018-01-01 RX ADMIN — HYDROMORPHONE HYDROCHLORIDE 0.5 MG: 1 INJECTION, SOLUTION INTRAMUSCULAR; INTRAVENOUS; SUBCUTANEOUS at 11:46

## 2018-01-01 RX ADMIN — IPRATROPIUM BROMIDE 0.5 MG: 0.5 SOLUTION RESPIRATORY (INHALATION) at 14:22

## 2018-01-01 RX ADMIN — FINASTERIDE 5 MG: 5 TABLET, FILM COATED ORAL at 10:25

## 2018-01-01 RX ADMIN — DILTIAZEM HYDROCHLORIDE 90 MG: 30 TABLET, FILM COATED ORAL at 06:40

## 2018-01-01 RX ADMIN — DIGOXIN 0.12 MG: 125 TABLET ORAL at 08:50

## 2018-01-01 RX ADMIN — LORAZEPAM 2 MG: 2 INJECTION INTRAMUSCULAR; INTRAVENOUS at 03:00

## 2018-01-01 RX ADMIN — HYDROMORPHONE HYDROCHLORIDE 1 MG: 1 INJECTION, SOLUTION INTRAMUSCULAR; INTRAVENOUS; SUBCUTANEOUS at 22:57

## 2018-01-01 RX ADMIN — DILTIAZEM HYDROCHLORIDE 10 MG: 5 INJECTION INTRAVENOUS at 22:11

## 2018-01-01 RX ADMIN — LORAZEPAM 2 MG: 2 INJECTION INTRAMUSCULAR; INTRAVENOUS at 10:39

## 2018-01-01 RX ADMIN — HYDROMORPHONE HYDROCHLORIDE 1 MG: 1 INJECTION, SOLUTION INTRAMUSCULAR; INTRAVENOUS; SUBCUTANEOUS at 16:49

## 2018-01-01 RX ADMIN — PRAMIPEXOLE DIHYDROCHLORIDE 0.12 MG: 0.25 TABLET ORAL at 21:32

## 2018-01-01 RX ADMIN — HYDROMORPHONE HYDROCHLORIDE 0.5 MG: 1 INJECTION, SOLUTION INTRAMUSCULAR; INTRAVENOUS; SUBCUTANEOUS at 20:37

## 2018-01-01 RX ADMIN — Medication 5 ML: at 11:38

## 2018-01-01 RX ADMIN — Medication 10 ML: at 16:06

## 2018-01-01 RX ADMIN — LEVOTHYROXINE SODIUM 100 MCG: 100 TABLET ORAL at 07:00

## 2018-01-01 RX ADMIN — LORAZEPAM 2 MG: 2 INJECTION INTRAMUSCULAR; INTRAVENOUS at 06:55

## 2018-01-01 RX ADMIN — OSELTAMIVIR PHOSPHATE 30 MG: 6 POWDER, FOR SUSPENSION ORAL at 08:56

## 2018-01-01 RX ADMIN — DILTIAZEM HYDROCHLORIDE 30 MG: 30 TABLET, FILM COATED ORAL at 22:58

## 2018-01-01 RX ADMIN — DILTIAZEM HYDROCHLORIDE 90 MG: 30 TABLET, FILM COATED ORAL at 17:02

## 2018-01-01 RX ADMIN — SIMVASTATIN 20 MG: 20 TABLET, FILM COATED ORAL at 21:42

## 2018-01-01 RX ADMIN — HYDROMORPHONE HYDROCHLORIDE 0.5 MG: 1 INJECTION, SOLUTION INTRAMUSCULAR; INTRAVENOUS; SUBCUTANEOUS at 20:16

## 2018-01-01 RX ADMIN — ENOXAPARIN SODIUM 40 MG: 40 INJECTION SUBCUTANEOUS at 10:00

## 2018-01-01 RX ADMIN — IPRATROPIUM BROMIDE 0.5 MG: 0.5 SOLUTION RESPIRATORY (INHALATION) at 23:29

## 2018-01-01 RX ADMIN — DILTIAZEM HYDROCHLORIDE 90 MG: 30 TABLET, FILM COATED ORAL at 21:42

## 2018-01-01 RX ADMIN — SODIUM CHLORIDE 75 ML/HR: 900 INJECTION, SOLUTION INTRAVENOUS at 22:11

## 2018-01-01 RX ADMIN — OSELTAMIVIR PHOSPHATE 30 MG: 6 POWDER, FOR SUSPENSION ORAL at 22:31

## 2018-01-01 RX ADMIN — LORAZEPAM 1 MG: 2 INJECTION INTRAMUSCULAR; INTRAVENOUS at 17:00

## 2018-01-01 RX ADMIN — GLIPIZIDE 2.5 MG: 2.5 TABLET, FILM COATED, EXTENDED RELEASE ORAL at 12:19

## 2018-01-01 RX ADMIN — Medication 10 ML: at 06:45

## 2018-01-01 RX ADMIN — DILTIAZEM HYDROCHLORIDE 30 MG: 30 TABLET, FILM COATED ORAL at 07:16

## 2018-01-01 RX ADMIN — MIDAZOLAM 2 MG: 1 INJECTION INTRAMUSCULAR; INTRAVENOUS at 13:28

## 2018-01-01 RX ADMIN — HYDROMORPHONE HYDROCHLORIDE 1 MG: 1 INJECTION, SOLUTION INTRAMUSCULAR; INTRAVENOUS; SUBCUTANEOUS at 02:58

## 2018-01-01 RX ADMIN — Medication 5 ML: at 15:48

## 2018-01-01 RX ADMIN — LORAZEPAM 1 MG: 2 INJECTION INTRAMUSCULAR; INTRAVENOUS at 10:57

## 2018-01-01 RX ADMIN — PRAMIPEXOLE DIHYDROCHLORIDE 0.12 MG: 0.25 TABLET ORAL at 21:42

## 2018-01-01 RX ADMIN — PANTOPRAZOLE SODIUM 40 MG: 40 TABLET, DELAYED RELEASE ORAL at 08:57

## 2018-01-01 RX ADMIN — HYDROMORPHONE HYDROCHLORIDE 0.5 MG: 1 INJECTION, SOLUTION INTRAMUSCULAR; INTRAVENOUS; SUBCUTANEOUS at 13:09

## 2018-01-01 RX ADMIN — HYDROMORPHONE HYDROCHLORIDE 1 MG: 1 INJECTION, SOLUTION INTRAMUSCULAR; INTRAVENOUS; SUBCUTANEOUS at 19:01

## 2018-01-01 RX ADMIN — LEVETIRACETAM 1000 MG: 100 INJECTION, SOLUTION INTRAVENOUS at 14:43

## 2018-01-01 RX ADMIN — SODIUM CHLORIDE 2.5 MG/HR: 900 INJECTION, SOLUTION INTRAVENOUS at 00:24

## 2018-01-01 RX ADMIN — HYDROMORPHONE HYDROCHLORIDE 0.5 MG: 1 INJECTION, SOLUTION INTRAMUSCULAR; INTRAVENOUS; SUBCUTANEOUS at 11:37

## 2018-01-01 RX ADMIN — DILTIAZEM HYDROCHLORIDE 90 MG: 30 TABLET, FILM COATED ORAL at 11:54

## 2018-01-01 RX ADMIN — Medication 10 ML: at 06:00

## 2018-01-01 RX ADMIN — FINASTERIDE 5 MG: 5 TABLET, FILM COATED ORAL at 08:50

## 2018-01-01 RX ADMIN — HYDROMORPHONE HYDROCHLORIDE 0.5 MG: 1 INJECTION, SOLUTION INTRAMUSCULAR; INTRAVENOUS; SUBCUTANEOUS at 09:01

## 2018-01-01 RX ADMIN — PREGABALIN 400 MG: 100 CAPSULE ORAL at 22:31

## 2018-01-01 RX ADMIN — DILTIAZEM HYDROCHLORIDE 30 MG: 30 TABLET, FILM COATED ORAL at 07:23

## 2018-01-01 RX ADMIN — LORAZEPAM 2 MG: 2 INJECTION INTRAMUSCULAR; INTRAVENOUS at 23:00

## 2018-01-01 RX ADMIN — Medication 5 ML: at 20:30

## 2018-01-01 RX ADMIN — DILTIAZEM HYDROCHLORIDE 30 MG: 30 TABLET, FILM COATED ORAL at 12:23

## 2018-01-01 RX ADMIN — Medication 1 CAPSULE: at 10:24

## 2018-01-01 RX ADMIN — DULOXETINE HYDROCHLORIDE 30 MG: 30 CAPSULE, DELAYED RELEASE ORAL at 21:03

## 2018-01-01 RX ADMIN — DULOXETINE HYDROCHLORIDE 30 MG: 30 CAPSULE, DELAYED RELEASE ORAL at 21:41

## 2018-01-01 RX ADMIN — SITAGLIPTIN 50 MG: 25 TABLET, FILM COATED ORAL at 10:00

## 2018-01-01 RX ADMIN — Medication 10 ML: at 18:22

## 2018-01-01 RX ADMIN — DILTIAZEM HYDROCHLORIDE 90 MG: 30 TABLET, FILM COATED ORAL at 16:29

## 2018-01-01 RX ADMIN — OSELTAMIVIR PHOSPHATE 30 MG: 6 POWDER, FOR SUSPENSION ORAL at 17:07

## 2018-01-01 RX ADMIN — IPRATROPIUM BROMIDE 0.5 MG: 0.5 SOLUTION RESPIRATORY (INHALATION) at 05:49

## 2018-01-01 RX ADMIN — HYDROMORPHONE HYDROCHLORIDE 0.5 MG: 1 INJECTION, SOLUTION INTRAMUSCULAR; INTRAVENOUS; SUBCUTANEOUS at 16:29

## 2018-01-01 RX ADMIN — Medication 5 ML: at 13:52

## 2018-01-01 RX ADMIN — FUROSEMIDE 60 MG: 10 INJECTION, SOLUTION INTRAMUSCULAR; INTRAVENOUS at 08:51

## 2018-01-01 RX ADMIN — OSELTAMIVIR PHOSPHATE 30 MG: 6 POWDER, FOR SUSPENSION ORAL at 09:59

## 2018-01-01 RX ADMIN — GLIPIZIDE 2.5 MG: 2.5 TABLET, FILM COATED, EXTENDED RELEASE ORAL at 10:01

## 2018-01-01 RX ADMIN — Medication 10 ML: at 22:00

## 2018-01-01 RX ADMIN — SIMVASTATIN 20 MG: 20 TABLET, FILM COATED ORAL at 21:59

## 2018-01-01 RX ADMIN — FAMOTIDINE 40 MG: 20 TABLET, FILM COATED ORAL at 21:58

## 2018-01-01 RX ADMIN — SODIUM CHLORIDE 1000 MG: 900 INJECTION, SOLUTION INTRAVENOUS at 02:58

## 2018-01-01 RX ADMIN — HALOPERIDOL LACTATE 5 MG: 5 INJECTION, SOLUTION INTRAMUSCULAR at 13:08

## 2018-01-01 RX ADMIN — ENOXAPARIN SODIUM 40 MG: 40 INJECTION SUBCUTANEOUS at 09:50

## 2018-01-01 RX ADMIN — FINASTERIDE 5 MG: 5 TABLET, FILM COATED ORAL at 10:00

## 2018-01-01 RX ADMIN — HYDROMORPHONE HYDROCHLORIDE 0.5 MG: 1 INJECTION, SOLUTION INTRAMUSCULAR; INTRAVENOUS; SUBCUTANEOUS at 08:29

## 2018-01-01 RX ADMIN — Medication 5 ML: at 16:29

## 2018-01-01 RX ADMIN — LORAZEPAM 2 MG: 2 INJECTION INTRAMUSCULAR at 18:18

## 2018-01-01 RX ADMIN — HYDROMORPHONE HYDROCHLORIDE 0.5 MG: 1 INJECTION, SOLUTION INTRAMUSCULAR; INTRAVENOUS; SUBCUTANEOUS at 20:35

## 2018-01-01 RX ADMIN — PANTOPRAZOLE SODIUM 40 MG: 40 TABLET, DELAYED RELEASE ORAL at 06:41

## 2018-01-01 RX ADMIN — HYDROMORPHONE HYDROCHLORIDE 0.5 MG: 1 INJECTION, SOLUTION INTRAMUSCULAR; INTRAVENOUS; SUBCUTANEOUS at 02:09

## 2018-01-01 RX ADMIN — ASPIRIN 81 MG 81 MG: 81 TABLET ORAL at 09:24

## 2018-01-01 RX ADMIN — HYDROMORPHONE HYDROCHLORIDE 0.5 MG: 1 INJECTION, SOLUTION INTRAMUSCULAR; INTRAVENOUS; SUBCUTANEOUS at 13:58

## 2018-01-01 RX ADMIN — HYDROMORPHONE HYDROCHLORIDE 1 MG: 1 INJECTION, SOLUTION INTRAMUSCULAR; INTRAVENOUS; SUBCUTANEOUS at 10:25

## 2018-01-01 RX ADMIN — ACETAMINOPHEN 650 MG: 650 SUPPOSITORY RECTAL at 14:43

## 2018-01-01 RX ADMIN — Medication 5 ML: at 23:14

## 2018-01-01 RX ADMIN — HYDROMORPHONE HYDROCHLORIDE 0.5 MG: 1 INJECTION, SOLUTION INTRAMUSCULAR; INTRAVENOUS; SUBCUTANEOUS at 20:30

## 2018-01-01 RX ADMIN — OSELTAMIVIR PHOSPHATE 30 MG: 6 POWDER, FOR SUSPENSION ORAL at 10:00

## 2018-01-01 RX ADMIN — LORAZEPAM 1 MG: 2 INJECTION INTRAMUSCULAR; INTRAVENOUS at 15:48

## 2018-01-01 RX ADMIN — IPRATROPIUM BROMIDE AND ALBUTEROL SULFATE 3 ML: .5; 3 SOLUTION RESPIRATORY (INHALATION) at 01:59

## 2018-01-01 RX ADMIN — ASPIRIN 81 MG 81 MG: 81 TABLET ORAL at 09:02

## 2018-01-01 RX ADMIN — IPRATROPIUM BROMIDE 0.5 MG: 0.5 SOLUTION RESPIRATORY (INHALATION) at 08:00

## 2018-01-01 RX ADMIN — HYDROMORPHONE HYDROCHLORIDE 0.5 MG: 1 INJECTION, SOLUTION INTRAMUSCULAR; INTRAVENOUS; SUBCUTANEOUS at 03:54

## 2018-01-01 RX ADMIN — LORAZEPAM 2 MG: 2 INJECTION INTRAMUSCULAR; INTRAVENOUS at 19:57

## 2018-01-01 RX ADMIN — SIMVASTATIN 20 MG: 20 TABLET, FILM COATED ORAL at 22:31

## 2018-01-01 RX ADMIN — HYDROMORPHONE HYDROCHLORIDE 1 MG: 1 INJECTION, SOLUTION INTRAMUSCULAR; INTRAVENOUS; SUBCUTANEOUS at 14:43

## 2018-01-01 RX ADMIN — SODIUM CHLORIDE 1000 ML: 900 INJECTION, SOLUTION INTRAVENOUS at 12:45

## 2018-01-01 RX ADMIN — DIGOXIN 0.12 MG: 125 TABLET ORAL at 10:25

## 2018-01-01 RX ADMIN — SODIUM CHLORIDE 1000 MG: 900 INJECTION, SOLUTION INTRAVENOUS at 02:11

## 2018-01-01 RX ADMIN — DULOXETINE HYDROCHLORIDE 30 MG: 30 CAPSULE, DELAYED RELEASE ORAL at 22:50

## 2018-01-01 RX ADMIN — IPRATROPIUM BROMIDE 0.5 MG: 0.5 SOLUTION RESPIRATORY (INHALATION) at 22:09

## 2018-01-01 RX ADMIN — Medication 1 CAPSULE: at 08:50

## 2018-01-01 RX ADMIN — IPRATROPIUM BROMIDE 0.5 MG: 0.5 SOLUTION RESPIRATORY (INHALATION) at 12:35

## 2018-01-01 RX ADMIN — Medication 10 ML: at 15:41

## 2018-01-01 RX ADMIN — CEFEPIME HYDROCHLORIDE 2 G: 2 INJECTION, POWDER, FOR SOLUTION INTRAVENOUS at 07:01

## 2018-01-01 RX ADMIN — PRAMIPEXOLE DIHYDROCHLORIDE 0.12 MG: 0.25 TABLET ORAL at 22:31

## 2018-01-01 RX ADMIN — INSULIN LISPRO 2 UNITS: 100 INJECTION, SOLUTION INTRAVENOUS; SUBCUTANEOUS at 07:30

## 2018-01-01 RX ADMIN — SITAGLIPTIN 50 MG: 25 TABLET, FILM COATED ORAL at 12:18

## 2018-01-01 RX ADMIN — IPRATROPIUM BROMIDE AND ALBUTEROL SULFATE 3 ML: .5; 3 SOLUTION RESPIRATORY (INHALATION) at 07:35

## 2018-01-01 RX ADMIN — TAMSULOSIN HYDROCHLORIDE 0.4 MG: 0.4 CAPSULE ORAL at 21:03

## 2018-01-01 RX ADMIN — HYDROMORPHONE HYDROCHLORIDE 0.5 MG: 1 INJECTION, SOLUTION INTRAMUSCULAR; INTRAVENOUS; SUBCUTANEOUS at 00:19

## 2018-01-01 RX ADMIN — IPRATROPIUM BROMIDE 0.5 MG: 0.5 SOLUTION RESPIRATORY (INHALATION) at 12:20

## 2018-01-01 RX ADMIN — DULOXETINE HYDROCHLORIDE 30 MG: 30 CAPSULE, DELAYED RELEASE ORAL at 21:31

## 2018-01-01 RX ADMIN — Medication 10 ML: at 21:07

## 2018-01-01 RX ADMIN — LORAZEPAM 1 MG: 2 INJECTION INTRAMUSCULAR; INTRAVENOUS at 01:00

## 2018-01-01 RX ADMIN — LORAZEPAM 1 MG: 2 INJECTION, SOLUTION INTRAMUSCULAR; INTRAVENOUS at 22:34

## 2018-01-01 RX ADMIN — LORAZEPAM 2 MG: 2 INJECTION INTRAMUSCULAR; INTRAVENOUS at 19:16

## 2018-01-01 RX ADMIN — DILTIAZEM HYDROCHLORIDE 120 MG: 120 CAPSULE, COATED, EXTENDED RELEASE ORAL at 12:58

## 2018-01-01 RX ADMIN — LORAZEPAM 2 MG: 2 INJECTION INTRAMUSCULAR; INTRAVENOUS at 18:18

## 2018-01-01 RX ADMIN — ASPIRIN 81 MG 81 MG: 81 TABLET ORAL at 10:25

## 2018-01-01 RX ADMIN — LEVOTHYROXINE SODIUM 100 MCG: 100 TABLET ORAL at 08:57

## 2018-01-01 RX ADMIN — FINASTERIDE 5 MG: 5 TABLET, FILM COATED ORAL at 09:23

## 2018-01-01 RX ADMIN — FAMOTIDINE 40 MG: 20 TABLET, FILM COATED ORAL at 21:31

## 2018-01-01 RX ADMIN — PANTOPRAZOLE SODIUM 40 MG: 40 TABLET, DELAYED RELEASE ORAL at 06:55

## 2018-01-01 RX ADMIN — GADOBUTROL 8 ML: 604.72 INJECTION INTRAVENOUS at 10:45

## 2018-01-01 RX ADMIN — Medication 5 ML: at 20:16

## 2018-01-01 RX ADMIN — PREGABALIN 400 MG: 100 CAPSULE ORAL at 21:03

## 2018-01-01 RX ADMIN — LORAZEPAM 1 MG: 2 INJECTION INTRAMUSCULAR; INTRAVENOUS at 13:54

## 2018-01-01 RX ADMIN — CEFEPIME HYDROCHLORIDE 2 G: 2 INJECTION, POWDER, FOR SOLUTION INTRAVENOUS at 22:04

## 2018-01-01 RX ADMIN — HYDROMORPHONE HYDROCHLORIDE 0.5 MG: 1 INJECTION, SOLUTION INTRAMUSCULAR; INTRAVENOUS; SUBCUTANEOUS at 13:18

## 2018-01-01 RX ADMIN — OSELTAMIVIR PHOSPHATE 30 MG: 6 POWDER, FOR SUSPENSION ORAL at 09:23

## 2018-01-01 RX ADMIN — LORAZEPAM 1 MG: 2 INJECTION INTRAMUSCULAR; INTRAVENOUS at 09:00

## 2018-01-01 RX ADMIN — FAMOTIDINE 40 MG: 20 TABLET, FILM COATED ORAL at 21:03

## 2018-01-01 RX ADMIN — Medication 5 ML: at 10:27

## 2018-01-01 RX ADMIN — Medication 10 ML: at 22:13

## 2018-01-01 RX ADMIN — LORAZEPAM 2 MG: 2 INJECTION INTRAMUSCULAR; INTRAVENOUS at 10:27

## 2018-01-01 RX ADMIN — Medication 10 ML: at 07:24

## 2018-01-01 RX ADMIN — Medication 1 CAPSULE: at 09:52

## 2018-01-01 RX ADMIN — ASPIRIN 81 MG 81 MG: 81 TABLET ORAL at 08:50

## 2018-01-01 RX ADMIN — ENOXAPARIN SODIUM 40 MG: 40 INJECTION SUBCUTANEOUS at 12:22

## 2018-01-01 RX ADMIN — LEVOTHYROXINE SODIUM 100 MCG: 100 TABLET ORAL at 07:17

## 2018-01-01 RX ADMIN — PREGABALIN 400 MG: 100 CAPSULE ORAL at 21:59

## 2018-01-01 RX ADMIN — FUROSEMIDE 40 MG: 40 TABLET ORAL at 09:51

## 2018-01-01 RX ADMIN — HYDROMORPHONE HYDROCHLORIDE 1.5 MG: 2 INJECTION INTRAMUSCULAR; INTRAVENOUS; SUBCUTANEOUS at 16:02

## 2018-01-01 RX ADMIN — LORAZEPAM 2 MG: 2 INJECTION INTRAMUSCULAR; INTRAVENOUS at 14:42

## 2018-01-01 RX ADMIN — ACETAMINOPHEN 650 MG: 650 SUPPOSITORY RECTAL at 09:03

## 2018-01-01 RX ADMIN — DILTIAZEM HYDROCHLORIDE 90 MG: 30 TABLET, FILM COATED ORAL at 12:26

## 2018-01-01 RX ADMIN — PREGABALIN 400 MG: 100 CAPSULE ORAL at 21:31

## 2018-01-01 RX ADMIN — HYDROMORPHONE HYDROCHLORIDE 1 MG: 1 INJECTION, SOLUTION INTRAMUSCULAR; INTRAVENOUS; SUBCUTANEOUS at 10:38

## 2018-01-01 RX ADMIN — IPRATROPIUM BROMIDE 0.5 MG: 0.5 SOLUTION RESPIRATORY (INHALATION) at 16:35

## 2018-01-01 RX ADMIN — HYDROMORPHONE HYDROCHLORIDE 1 MG: 1 INJECTION, SOLUTION INTRAMUSCULAR; INTRAVENOUS; SUBCUTANEOUS at 14:35

## 2018-01-01 RX ADMIN — LEVETIRACETAM 1500 MG: 100 INJECTION, SOLUTION, CONCENTRATE INTRAVENOUS at 01:33

## 2018-01-01 RX ADMIN — TAMSULOSIN HYDROCHLORIDE 0.4 MG: 0.4 CAPSULE ORAL at 21:41

## 2018-01-01 RX ADMIN — OSELTAMIVIR PHOSPHATE 30 MG: 6 POWDER, FOR SUSPENSION ORAL at 17:48

## 2018-01-01 RX ADMIN — Medication 5 ML: at 06:00

## 2018-01-01 RX ADMIN — DIGOXIN 0.12 MG: 125 TABLET ORAL at 10:01

## 2018-01-01 RX ADMIN — PANTOPRAZOLE SODIUM 40 MG: 40 TABLET, DELAYED RELEASE ORAL at 07:00

## 2018-01-01 RX ADMIN — DILTIAZEM HYDROCHLORIDE 90 MG: 30 TABLET, FILM COATED ORAL at 11:59

## 2018-01-01 RX ADMIN — SIMVASTATIN 20 MG: 20 TABLET, FILM COATED ORAL at 21:32

## 2018-01-01 RX ADMIN — DILTIAZEM HYDROCHLORIDE 30 MG: 30 TABLET, FILM COATED ORAL at 18:53

## 2018-01-01 RX ADMIN — IPRATROPIUM BROMIDE 0.5 MG: 0.5 SOLUTION RESPIRATORY (INHALATION) at 04:00

## 2018-01-01 RX ADMIN — HYDROMORPHONE HYDROCHLORIDE 1.5 MG: 2 INJECTION INTRAMUSCULAR; INTRAVENOUS; SUBCUTANEOUS at 16:30

## 2018-01-01 RX ADMIN — LORAZEPAM 1 MG: 2 INJECTION INTRAMUSCULAR; INTRAVENOUS at 12:40

## 2018-01-01 RX ADMIN — Medication 5 ML: at 15:41

## 2018-01-01 RX ADMIN — TAMSULOSIN HYDROCHLORIDE 0.4 MG: 0.4 CAPSULE ORAL at 22:51

## 2018-01-01 RX ADMIN — LORAZEPAM 2 MG: 2 INJECTION INTRAMUSCULAR; INTRAVENOUS at 15:19

## 2018-01-01 RX ADMIN — HYDROMORPHONE HYDROCHLORIDE 0.5 MG: 1 INJECTION, SOLUTION INTRAMUSCULAR; INTRAVENOUS; SUBCUTANEOUS at 10:54

## 2018-01-01 RX ADMIN — ACETAMINOPHEN 650 MG: 325 TABLET, FILM COATED ORAL at 22:32

## 2018-01-01 RX ADMIN — HYDROMORPHONE HYDROCHLORIDE 0.5 MG: 1 INJECTION, SOLUTION INTRAMUSCULAR; INTRAVENOUS; SUBCUTANEOUS at 11:34

## 2018-01-01 RX ADMIN — OSELTAMIVIR PHOSPHATE 30 MG: 6 POWDER, FOR SUSPENSION ORAL at 18:21

## 2018-01-01 RX ADMIN — PRAMIPEXOLE DIHYDROCHLORIDE 0.12 MG: 0.25 TABLET ORAL at 21:04

## 2018-01-01 RX ADMIN — ENOXAPARIN SODIUM 40 MG: 40 INJECTION SUBCUTANEOUS at 09:23

## 2018-01-01 RX ADMIN — Medication 5 ML: at 20:36

## 2018-01-01 RX ADMIN — DILTIAZEM HYDROCHLORIDE 90 MG: 30 TABLET, FILM COATED ORAL at 21:03

## 2018-01-01 RX ADMIN — HYDROMORPHONE HYDROCHLORIDE 1 MG: 1 INJECTION, SOLUTION INTRAMUSCULAR; INTRAVENOUS; SUBCUTANEOUS at 15:19

## 2018-01-01 RX ADMIN — IPRATROPIUM BROMIDE 0.5 MG: 0.5 SOLUTION RESPIRATORY (INHALATION) at 23:13

## 2018-01-01 RX ADMIN — SIMVASTATIN 20 MG: 20 TABLET, FILM COATED ORAL at 21:03

## 2018-01-01 RX ADMIN — Medication 10 ML: at 21:43

## 2018-01-01 RX ADMIN — PANTOPRAZOLE SODIUM 40 MG: 40 TABLET, DELAYED RELEASE ORAL at 07:17

## 2018-01-01 RX ADMIN — PRAMIPEXOLE DIHYDROCHLORIDE 0.12 MG: 0.25 TABLET ORAL at 22:00

## 2018-01-01 RX ADMIN — DULOXETINE HYDROCHLORIDE 30 MG: 30 CAPSULE, DELAYED RELEASE ORAL at 21:59

## 2018-01-01 RX ADMIN — HYDROMORPHONE HYDROCHLORIDE 0.5 MG: 1 INJECTION, SOLUTION INTRAMUSCULAR; INTRAVENOUS; SUBCUTANEOUS at 03:28

## 2018-01-01 RX ADMIN — SODIUM CHLORIDE 75 ML/HR: 900 INJECTION, SOLUTION INTRAVENOUS at 22:06

## 2018-01-01 RX ADMIN — HYDROMORPHONE HYDROCHLORIDE 0.5 MG: 1 INJECTION, SOLUTION INTRAMUSCULAR; INTRAVENOUS; SUBCUTANEOUS at 23:14

## 2018-01-01 RX ADMIN — IPRATROPIUM BROMIDE 0.5 MG: 0.5 SOLUTION RESPIRATORY (INHALATION) at 20:27

## 2018-01-01 RX ADMIN — Medication 1 CAPSULE: at 09:24

## 2018-01-01 RX ADMIN — Medication 10 ML: at 13:58

## 2018-01-01 RX ADMIN — HYDROMORPHONE HYDROCHLORIDE 0.5 MG: 1 INJECTION, SOLUTION INTRAMUSCULAR; INTRAVENOUS; SUBCUTANEOUS at 17:52

## 2018-01-01 RX ADMIN — HYDROMORPHONE HYDROCHLORIDE 1 MG: 1 INJECTION, SOLUTION INTRAMUSCULAR; INTRAVENOUS; SUBCUTANEOUS at 03:25

## 2018-01-01 RX ADMIN — ACETAMINOPHEN 650 MG: 650 SUPPOSITORY RECTAL at 03:19

## 2018-01-01 RX ADMIN — Medication 1 CAPSULE: at 10:01

## 2018-01-01 RX ADMIN — IPRATROPIUM BROMIDE AND ALBUTEROL SULFATE 3 ML: .5; 3 SOLUTION RESPIRATORY (INHALATION) at 23:58

## 2018-01-01 RX ADMIN — Medication 10 ML: at 22:04

## 2018-01-01 RX ADMIN — DIGOXIN 0.12 MG: 125 TABLET ORAL at 09:23

## 2018-01-01 RX ADMIN — TAMSULOSIN HYDROCHLORIDE 0.4 MG: 0.4 CAPSULE ORAL at 22:31

## 2018-01-01 RX ADMIN — SITAGLIPTIN 25 MG: 25 TABLET, FILM COATED ORAL at 08:50

## 2018-01-01 RX ADMIN — DIGOXIN 0.12 MG: 125 TABLET ORAL at 09:52

## 2018-01-01 RX ADMIN — DILTIAZEM HYDROCHLORIDE 30 MG: 30 TABLET, FILM COATED ORAL at 16:59

## 2018-01-01 RX ADMIN — FAMOTIDINE 40 MG: 20 TABLET, FILM COATED ORAL at 22:50

## 2018-01-01 RX ADMIN — SODIUM CHLORIDE 75 ML/HR: 900 INJECTION, SOLUTION INTRAVENOUS at 20:04

## 2018-01-01 RX ADMIN — FINASTERIDE 5 MG: 5 TABLET, FILM COATED ORAL at 09:51

## 2018-01-01 RX ADMIN — LORAZEPAM 1 MG: 2 INJECTION INTRAMUSCULAR; INTRAVENOUS at 17:52

## 2018-01-01 RX ADMIN — Medication 10 ML: at 22:32

## 2018-01-01 RX ADMIN — SODIUM CHLORIDE 1000 MG: 900 INJECTION, SOLUTION INTRAVENOUS at 13:52

## 2018-01-01 RX ADMIN — INSULIN LISPRO 2 UNITS: 100 INJECTION, SOLUTION INTRAVENOUS; SUBCUTANEOUS at 11:53

## 2018-01-01 RX ADMIN — HYDROMORPHONE HYDROCHLORIDE 0.5 MG: 1 INJECTION, SOLUTION INTRAMUSCULAR; INTRAVENOUS; SUBCUTANEOUS at 10:57

## 2018-01-01 RX ADMIN — LORAZEPAM 1 MG: 2 INJECTION INTRAMUSCULAR; INTRAVENOUS at 10:08

## 2018-01-01 RX ADMIN — TAMSULOSIN HYDROCHLORIDE 0.4 MG: 0.4 CAPSULE ORAL at 21:59

## 2018-01-01 RX ADMIN — IPRATROPIUM BROMIDE 0.5 MG: 0.5 SOLUTION RESPIRATORY (INHALATION) at 08:27

## 2018-01-01 RX ADMIN — PANTOPRAZOLE SODIUM 40 MG: 40 TABLET, DELAYED RELEASE ORAL at 07:23

## 2018-01-01 RX ADMIN — Medication 5 ML: at 11:46

## 2018-01-01 RX ADMIN — KETOROLAC TROMETHAMINE 30 MG: 30 INJECTION, SOLUTION INTRAMUSCULAR at 11:10

## 2018-01-01 RX ADMIN — SODIUM CHLORIDE 1000 MG: 900 INJECTION, SOLUTION INTRAVENOUS at 14:00

## 2018-01-01 RX ADMIN — HYDROMORPHONE HYDROCHLORIDE 0.5 MG: 1 INJECTION, SOLUTION INTRAMUSCULAR; INTRAVENOUS; SUBCUTANEOUS at 04:01

## 2018-01-01 RX ADMIN — HYDROMORPHONE HYDROCHLORIDE 0.5 MG: 1 INJECTION, SOLUTION INTRAMUSCULAR; INTRAVENOUS; SUBCUTANEOUS at 15:40

## 2018-01-01 RX ADMIN — ENOXAPARIN SODIUM 40 MG: 40 INJECTION SUBCUTANEOUS at 08:51

## 2018-01-01 RX ADMIN — HYDROMORPHONE HYDROCHLORIDE 0.5 MG: 1 INJECTION, SOLUTION INTRAMUSCULAR; INTRAVENOUS; SUBCUTANEOUS at 07:23

## 2018-01-01 RX ADMIN — LORAZEPAM 1 MG: 2 INJECTION INTRAMUSCULAR; INTRAVENOUS at 00:43

## 2018-01-01 RX ADMIN — LORAZEPAM 2 MG: 2 INJECTION INTRAMUSCULAR; INTRAVENOUS at 03:25

## 2018-01-01 RX ADMIN — Medication 10 ML: at 05:07

## 2018-01-01 RX ADMIN — PREGABALIN 400 MG: 100 CAPSULE ORAL at 21:42

## 2018-01-01 RX ADMIN — HYDROMORPHONE HYDROCHLORIDE 0.5 MG: 1 INJECTION, SOLUTION INTRAMUSCULAR; INTRAVENOUS; SUBCUTANEOUS at 07:30

## 2018-01-01 RX ADMIN — Medication 10 ML: at 06:56

## 2018-01-01 RX ADMIN — CEFEPIME HYDROCHLORIDE 2 G: 2 INJECTION, POWDER, FOR SOLUTION INTRAVENOUS at 06:51

## 2018-01-01 RX ADMIN — DIGOXIN 0.12 MG: 125 TABLET ORAL at 09:01

## 2018-01-01 RX ADMIN — HYDROMORPHONE HYDROCHLORIDE 1 MG: 1 INJECTION, SOLUTION INTRAMUSCULAR; INTRAVENOUS; SUBCUTANEOUS at 06:46

## 2018-01-01 RX ADMIN — DILTIAZEM HYDROCHLORIDE 5 MG/HR: 5 INJECTION, SOLUTION INTRAVENOUS at 22:18

## 2018-01-01 RX ADMIN — PREGABALIN 400 MG: 100 CAPSULE ORAL at 22:50

## 2018-01-01 RX ADMIN — ACETAMINOPHEN 650 MG: 650 SUPPOSITORY RECTAL at 23:47

## 2018-01-01 RX ADMIN — LORAZEPAM 2 MG: 2 INJECTION INTRAMUSCULAR; INTRAVENOUS at 19:02

## 2018-01-01 RX ADMIN — HYDROMORPHONE HYDROCHLORIDE 1 MG: 1 INJECTION, SOLUTION INTRAMUSCULAR; INTRAVENOUS; SUBCUTANEOUS at 19:16

## 2018-01-01 RX ADMIN — Medication 10 ML: at 21:46

## 2018-01-01 RX ADMIN — LEVOTHYROXINE SODIUM 100 MCG: 100 TABLET ORAL at 06:55

## 2018-01-01 RX ADMIN — IPRATROPIUM BROMIDE 0.5 MG: 0.5 SOLUTION RESPIRATORY (INHALATION) at 17:12

## 2018-01-01 RX ADMIN — HYDROMORPHONE HYDROCHLORIDE 1 MG: 1 INJECTION, SOLUTION INTRAMUSCULAR; INTRAVENOUS; SUBCUTANEOUS at 23:04

## 2018-01-01 RX ADMIN — PRAMIPEXOLE DIHYDROCHLORIDE 0.12 MG: 0.25 TABLET ORAL at 22:50

## 2018-01-01 RX ADMIN — SIMVASTATIN 20 MG: 20 TABLET, FILM COATED ORAL at 22:51

## 2018-01-01 RX ADMIN — OSELTAMIVIR PHOSPHATE 30 MG: 6 POWDER, FOR SUSPENSION ORAL at 09:01

## 2018-01-01 RX ADMIN — DILTIAZEM HYDROCHLORIDE 90 MG: 30 TABLET, FILM COATED ORAL at 06:55

## 2018-01-01 RX ADMIN — SODIUM CHLORIDE 5 MG/HR: 900 INJECTION, SOLUTION INTRAVENOUS at 09:55

## 2018-01-01 RX ADMIN — FAMOTIDINE 40 MG: 20 TABLET, FILM COATED ORAL at 21:41

## 2018-01-01 RX ADMIN — FUROSEMIDE 40 MG: 40 TABLET ORAL at 11:28

## 2018-01-01 RX ADMIN — ASPIRIN 81 MG 81 MG: 81 TABLET ORAL at 09:52

## 2018-02-05 PROBLEM — R50.9 FEVER: Status: ACTIVE | Noted: 2018-01-01

## 2018-02-05 NOTE — H&P
[address]  ACUTE CARE HISTORY AND PHYSICAL    Mariama Valle  MR#: [id number]  : 1923  ACCOUNT #: [de-identified]   DATE OF SERVICE: 2018    CHIEF COMPLAINT:  Fever. HISTORY OF PRESENT ILLNESS:  Patient is a 70-year-old gentleman with past medical history of atrial fibrillation, history of NPH with  shunt, history of abnormal EKG, hypertension, peripheral neuropathy, borderline diabetes, dyslipidemia, Meniere disease,  sensorineural hearing loss, orthostatic hypotension, history of CVA, hypertensive cardiovascular disease, CKD, mitral regurgitation and recurrent falls, who presents to the hospital with the above-mentioned symptoms. The patient is very hard of hearing and I suspect the patient has underlying cognitive impairment/NPH related dementia and thus not much history could be obtained from the patient, I cannot communicate because he was unable to hear me well as well as answer my questions, appears to be slightly confused. I did call the patient's son on his cell phone and left a message, but could not get a response from him (please note Mr. Forte Covert) so history was very limited, obtained from the ER physician. The ER physician reports that the patient lives at independent living facility and today was found in his bed with a fever of 100.4 degrees Fahrenheit. The patient was also noted to be less talkative and more lethargic off, has a history of pneumonia and wheezing. When I went to evaluate the patient he appears to be congested and appears to have some shortness of breath. He is also complaining of \"pain all over\", but denies any falls or injuries recently. Patient denies any other complaints or problems. Patient was found to be positive for influenza A in the ER and was requested to be observed under the hospitalist service. Denies any headache, blurry vision, sore throat, trouble swallowing, trouble with speech.   Denies any chest pain, shortness of breath, cough, fever, chills, abdominal pain, constipation, diarrhea, urinary symptoms, focal or generalized neurological weakness, recent travels or sick contacts. I am not sure how valid those are, no further history could be obtained from the patient. PAST MEDICAL HISTORY:  See above. HOME MEDICATIONS:  Diltiazem 120 mg every day, Lactobacillus, aspirin 81 mg every day, digoxin 0.125 mg daily, Cymbalta 30 mg  daily, glipizide 2.5 mg every day, levothyroxine 100 mcg every day,  300 mg nightly, simethicone 80 mg t.i.d., trazodone, cholecalciferol, clindamycin prior to dental, Lyrica 400 mg nightly, Mirapex 0.25 mg nightly, amlodipine 5 mg every day, finasteride 5 mg  daily, simvastatin 20 mg every day. SOCIAL HISTORY:  No history of tobacco abuse. Occasional alcohol use. No history of drug abuse. ALLERGIES:  ERYTHROMYCIN, PENICILLIN AND TETANUS. REVIEW OF SYSTEMS:  Suboptimal, as the patient is confused. Has a history of NPH, but he denies any complaints of problems on full review of symptoms. FAMILY HISTORY:  Father had history of heart disease per chart. PHYSICAL EXAMINATION:  VITAL SIGNS:  Temperature out on arrival was 102.6, pulse rate 83, respiratory rate 17, blood pressure 163/72, pulse ox 91% on room air. GENERAL:  Alert x1, awake, in no acute distress, resting in bed. Pleasant male, appears to be stated age. HEENT:  Pupils equal and reactive to light. Dry mucous membranes. NECK:  Supple. CHEST:  Decreased basal breath sounds. Scattered wheezes throughout lung fields. CARDIOVASCULAR:  S1, S2 were heard. ABDOMEN:  Soft, nontender, nondistended. Bowel sounds are physiologic. EXTREMITIES:  No clubbing, no cyanosis, no edema. NEUROPSYCHIATRIC:  Limited exam as the patient not following instructions. Appears to move all 4. Strength could not be tested. Cranial nerves cannot be tested. DTR 2+. Sensory appears to be grossly within normal limits.   SKIN: Warm.    LABORATORY DATA:  White count 7.1, hemoglobin 14.1, hematocrit 43.6, platelets 930. Sodium 137, potassium 3.9, chloride 102, bicarbonate 28, anion gap 7, glucose 127, BUN 19, creatinine 1.5, calcium 8.9, bilirubin total 0.8, ALT 21, AST 21, alkaline phosphatase 96. Lactic acid 1.2. Influenza A is positive, B is negative. Chest x-ray does not show any acute pathology, there is concern for developing interstitial edema that is suspected. ASSESSMENT AND PLAN:  1. Fever, most likely secondary to influenza. We will observe patient in the hospital.  We will start patient on Tamiflu. Provide gentle IV hydration, supportive care with oxygen support and pain control, Tylenol and continue to closely monitor. Patient is mildly hypoxic and there is concern for pneumonia, thus we will get a CT of the chest to rule out any acute pathology. I do not think the patient is volume overloaded at this point in time. We will provide DuoNeb and further intervention will be per hospital course. Continue to monitor and reassess as needed. 2.  History of atrial fibrillation. Continue home medications. Currently rate controlled in sinus rhythm. Continue to monitor. Patient not on anticoagulation secondary to repeated falls. Further intervention will be per hospital course. 3.  Diabetes. The patient will be on sliding scale NovoLog insulin, Accu-Chek, diet control and close monitoring. 4.  Hypertension, suboptimally controlled. Provide p.r.n. medication and continue to monitor. 5.  Gastrointestinal and deep venous thrombosis prophylaxis. The patient will be on SCDs.       Chano Larose MD MM / MN  D: 02/05/2018 17:56     T: 02/05/2018 18:46  JOB #: [id number]

## 2018-02-05 NOTE — IP AVS SNAPSHOT
[address] 
[phone] Patient: Edvin Arciniega MRN: [id number] RTJ:[id number] About your hospitalization You were admitted on:  February 5, 2018 You last received care in the:  [address] [place] [address] You were discharged on:  February 11, 2018 Why you were hospitalized Your primary diagnosis was:  Fever Your diagnoses also included:  Influenza A Follow-up Information Follow up With Details Comments Contact Info Aldo Naylor MD Schedule an appointment as soon as possible for a visit in 1 week For follow up after hospitalization [address] [place] [address] 
[phone] Discharge Orders None A check soledad indicates which time of day the medication should be taken. My Medications START taking these medications Instructions Each Dose to Equal  
 Morning Noon Evening Bedtime  
 ipratropium 0.02 % Soln Commonly known as:  ATROVENT Your last dose was: Your next dose is:    
   
   
 2.5 mL by Nebulization route every six (6) hours as needed. For wheezing. 0.5 mg  
    
   
   
   
  
  
CONTINUE taking these medications Instructions Each Dose to Equal  
 Morning Noon Evening Bedtime  
 aspirin delayed-release 81 mg tablet Your last dose was: Your next dose is: Take 1 Tab by mouth daily. 81 mg CARDIZEM  mg ER capsule Generic drug:  dilTIAZem CD Your last dose was: Your next dose is: Take 1 Cap by mouth daily. 120 mg  
    
   
   
   
  
 cholecalciferol (VITAMIN D3) 5,000 unit Tab tablet Commonly known as:  VITAMIN D3 Your last dose was: Your next dose is: Take 5,000 Units by mouth daily (with lunch). 5000 Units  
    
   
   
   
  
 clindamycin 300 mg capsule Commonly known as:  CLEOCIN  
   
 Your last dose was: Your next dose is: Take 600 mg by mouth once as needed (Prior to dental procedures). 600 mg CRANBERRY PLUS VITAMIN C 140-100 mg Cap Generic drug:  cranberry-vitamin c-vitamin e Your last dose was: Your next dose is: Take 2 Caps by mouth daily. 2 Cap * digoxin 0.125 mg tablet Commonly known as:  LANOXIN Your last dose was: Your next dose is: Take 0.125 mg by mouth daily. Daily in the AM and in the evenings on Mon, Wed, Thurs, Sat (see additional order) 0.125 mg  
    
   
   
   
  
 * digoxin 0.125 mg tablet Commonly known as:  LANOXIN Your last dose was: Your next dose is: Take 0.125 mg by mouth four (4) days a week. Daily in the AM and in the evenings on Mon, Wed, Thurs, Sat (see additional order) 0.125 mg DULoxetine 30 mg capsule Commonly known as:  CYMBALTA Your last dose was: Your next dose is: Take 30 mg by mouth nightly. 30 mg  
    
   
   
   
  
 finasteride 5 mg tablet Commonly known as:  PROSCAR Your last dose was: Your next dose is: Take 5 mg by mouth daily. 5 mg  
    
   
   
   
  
 fish oil-omega-3 fatty acids 340-1,000 mg capsule Your last dose was: Your next dose is: Take 1 Cap by mouth two (2) times a day. Lunch and Bedtime 1 Cap FLOMAX 0.4 mg capsule Generic drug:  tamsulosin Your last dose was: Your next dose is: Take 0.4 mg by mouth nightly. 0.4 mg Lactobacillus Acidoph & Bulgar 1 million cell Tab tablet Commonly known as:  Jose Eduardo Bears Your last dose was: Your next dose is: Take 2 Tabs by mouth two (2) times a day. 2 Tab  
    
   
   
   
  
 levothyroxine 100 mcg tablet Commonly known as:  SYNTHROID Your last dose was: Your next dose is: Take 100 mcg by mouth Daily (before breakfast). 100 mcg  
    
   
   
   
  
 omeprazole 20 mg capsule Commonly known as:  PRILOSEC Your last dose was: Your next dose is: Take 20 mg by mouth Daily (before breakfast). 20 mg  
    
   
   
   
  
 pramipexole 0.125 mg tablet Commonly known as:  MIRAPEX Your last dose was: Your next dose is: Take 0.125 mg by mouth nightly. 0.125 mg  
    
   
   
   
  
 pregabalin 200 mg capsule Commonly known as:  Akosua Speed Your last dose was: Your next dose is: Take 400 mg by mouth nightly. 400 mg  
    
   
   
   
  
 raNITIdine 300 mg tablet Commonly known as:  ZANTAC Your last dose was: Your next dose is: Take 300 mg by mouth nightly. 300 mg  
    
   
   
   
  
 simethicone 80 mg chewable tablet Commonly known as:  Lynnann Rudy Your last dose was: Your next dose is: Take 80 mg by mouth three (3) times daily. Indications: FLATULENCE  
 80 mg  
    
   
   
   
  
 simvastatin 20 mg tablet Commonly known as:  ZOCOR Your last dose was: Your next dose is: Take 20 mg by mouth nightly. 20 mg  
    
   
   
   
  
 traZODone 100 mg tablet Commonly known as:  Gladystine Gemma Your last dose was: Your next dose is: Take 200 mg by mouth nightly. 200 mg * Notice: This list has 2 medication(s) that are the same as other medications prescribed for you. Read the directions carefully, and ask your doctor or other care provider to review them with you. STOP taking these medications   
 amLODIPine 5 mg tablet Commonly known as:  NORVASC  
   
  
 glipiZIDE SR 2.5 mg CR tablet Commonly known as:  GLUCOTROL XL  
   
  
 TRADJENTA 5 mg tablet Generic drug:  linagliptin Where to Get Your Medications Information on where to get these meds will be given to you by the nurse or doctor. ! Ask your nurse or doctor about these medications  
  ipratropium 0.02 % Soln Discharge Instructions Discharging provider: Sotero Seo MD 
 
Primary care provider: Ilia Jaquez MD 
 
[address]: 
 
Patient is a 60-year-old gentleman with past medical history of atrial fibrillation, history of NPH with  shunt, history of abnormal EKG, hypertension, peripheral neuropathy, borderline diabetes, dyslipidemia, Meniere disease,  sensorineural hearing loss, orthostatic hypotension, history of CVA, hypertensive cardiovascular disease, CKD, mitral regurgitation and recurrent falls, who presents to the hospital with fever. Influenza A (POA) - much improved - CT chest 2/5 with trace bilateral fluid, no infiltrate but limited by respiratory artifact 
- Received oseltamivir 2/5-2/10 
- Stopped cefepime 
  
Acute diastolic heart failure - due to IV fluids, worsened 2/8 in the AM, continues to improve - Echo 2/6 with LVEF 55-60%, no WMA, wall thickness mildly increased, mild MR 
- BNP elevated but not much given age - Stopped IV fluids - Received lasix 
  
Paroxysmal Afib with RVR - appears to have aberrancy when he goes very fast, likely being drive by the flu. Rate controlled now - Continue telemetry - Changed duonebs to ipratroprium alone - Reduce diltiazem back to home dose, stop amlodipine (2 ccb). Continue digoxin 
- Continue aspirin, not on OAC due to frequent falls 
  
Metabolic encephalopathy - likely due to flu and hypoxia, back to baseline per family 
- Re-orient as able 
  
CKD3 - renal function stable, monitor with diuresis 
  
Troponin elevation - likely due to strain from flu, afib on top of CKD 
- Continue aspirin - Rate control as above - Cardiology consulted 
  
 Thrombocytopenia - unclear cause, no acute bleeding, monitor 
  
DM2 (chronic) - A1c 6.3, which is a bit low for a 80year old. BG acceptable even without his oral hypoglycemics - Stop glipizide and linagliptin 
- Continue diabetic diet 
- SSI as needed 
  
Hypothyroid (chronic) - continue home synthroid FOLLOW-UP CARE RECOMMENDATIONS: 
 
APPOINTMENTS: 
Follow-up Information Follow up With Details Comments Contact Info Sharmaine Boeck, MD Schedule an appointment as soon as possible for a visit in 1 week For follow up after hospitalization [address] [place] [address] 
[phone] It is very important that you keep follow-up appointment(s). Bring discharge papers, medication list (and/or medication bottles) to follow-up appointments for review by outpatient provider(s). ONGOING TREATMENT PLAN: PT/OT Specific symptoms to watch for: chest pain, shortness of breath, fever, chills, nausea, vomiting, diarrhea, change in mentation, falling, weakness, bleeding. DIET:  Diabetic Diet ACTIVITY:  Activity as tolerated and PT/OT Eval and Treat GOALS OF CARE: 
x  Eventual return to home/independent/assisted living Long term SNF Hospice No rehospitalization Patient condition at discharge:  
Functional status Poor   
x  Deconditioned Independent Cognition Lucid  
x  Forgetful (some sensescence) Dementia Catheters/lines (plus indication) Schmidt PICC   
  PEG Code status Full code   
x  DNR Stacey Jesus . . . . . . . . . . . . . . . . . . . . . . . . . . . . . . . . . . . . . . . . . . . . . . . . . . . . . . . . . . . . . . . . . . . . . . Stacey Jesus CHRONIC MEDICAL CONDITIONS: 
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 9  Unknown - Present Dyslipidemia ICD-10-CM: E78.5 ICD-9-CM: 272.4  Unknown - Present Neurological disorder ICD-10-CM: G98.8 ICD-9-CM: 349.9  Unknown - Present Overview Signed 1/24/2011 11:00 AM by Alexey Castellanos MD  
  dizziness, tilt table test 5-4-07 unremarkable Mitral regurgitation ICD-10-CM: I34.0 ICD-9-CM: 424.0  Unknown - Present Overview Signed 1/24/2011 11:00 AM by Alexey Castellanos MD  
  echo 3-27-07 Orthostatic hypotension ICD-10-CM: I95.1 ICD-9-CM: 458.0  1/17/2011 - Present Dizziness  ICD-10-CM: L66 ICD-9-CM: 780.4  1/17/2011 - Present Atrial fibrillation ([facility] [address] ICD-10-CM: I48.91 
ICD-9-CM: 427.31  Unknown - Present Overview Signed 1/24/2011 11:00 AM by Alexey Castellanos MD  
  CHRONIC Stroke Cerebral Embolism With Cerebral Infract ICD-10-CM: I63.40 ICD-9-CM: 434.11  1/17/2011 - Present HCVD (Hypertensive Cardiovasc Dis) Without Heart Failure ICD-10-CM: I11.9 ICD-9-CM: 402.90  1/17/2011 - Present RESOLVED: Acute confusion ICD-10-CM: R41.0 ICD-9-CM: 293.0  9/29/2012 - 9/30/2012 RESOLVED: Presyncope ICD-10-CM: R55 
ICD-9-CM: 780.2  1/17/2011 - 11/30/2017 MyChart Announcement  We are excited to announce that we are making your provider's discharge notes available to you in [employer]. You will see these notes when they are completed and signed by the physician that discharged you from your recent hospital stay. If you have any questions or concerns about any information you see in [employer], please call the Health Information Department where you were seen or reach out to your Primary Care Provider for more information about your plan of care. Introducing [facility]! [facility] introduces [employer] patient portal. Now you can access parts of your medical record, email your doctor's office, and request medication refills online. 1. In your internet browser, go to [email or web address] 2. Click on the First Time User? Click Here link in the Sign In box. You will see the New Member Sign Up page. 3. Enter your [employer] Access Code exactly as it appears below. You will not need to use this code after youve completed the sign-up process. If you do not sign up before the expiration date, you must request a new code. · [employer] Access Code: [email or web address] Expires: 3/4/2018  8:52 AM 
 
4. Enter the last four digits of your Social Security Number (xxxx) and Date of Birth (mm/dd/yyyy) as indicated and click Submit. You will be taken to the next sign-up page. 5. Create a [employer] ID. This will be your [employer] login ID and cannot be changed, so think of one that is secure and easy to remember. 6. Create a [employer] password. You can change your password at any time. 7. Enter your Password Reset Question and Answer. This can be used at a later time if you forget your password. 8. Enter your e-mail address. You will receive e-mail notification when new information is available in [address] 9. Click Sign Up. You can now view and download portions of your medical record. 10. Click the Download Summary menu link to download a portable copy of your medical information. If you have questions, please visit the Frequently Asked Questions section of the MyChart website. Remember, [employer] is NOT to be used for urgent needs. For medical emergencies, dial 911. Now available from your iPhone and Android! Providers Seen During Your Hospitalization Provider Specialty Primary office phone Tonda Lesch, MD Emergency Medicine [phone] Peewee Sy MD Hospitalist [phone] Joseph King MD Internal Medicine [phone] Your Primary Care Physician (PCP) Primary Care Physician Office Phone Office Fax Trish Click [phone] You are allergic to the following Allergen Reactions Erythromycin Rash Penicillins Rash Tetanus Vaccines And Toxoid Rash Recent Documentation Height Weight BMI Smoking Status 1.829 m 72 kg 21.53 kg/m2 Never Smoker Emergency Contacts Name Discharge Info Relation Home Work Mobile Giacomo Velazquez DISCHARGE CAREGIVER [3] Child [2]   [phone] Patient Belongings The following personal items are in your possession at time of discharge: 
  Dental Appliances: None  Visual Aid: None   Hearing Aids/Status: At home  Home Medications: None   Jewelry: None  Clothing: None    Other Valuables: None Please provide this summary of care documentation to your next provider. Signatures-by signing, you are acknowledging that this After Visit Summary has been reviewed with you and you have received a copy. Patient Signature:  ____________________________________________________________ Date:  ____________________________________________________________  
  
Pedro Payor Provider Signature:  ____________________________________________________________ Date:  ____________________________________________________________

## 2018-02-05 NOTE — ED NOTES
Patient currently unable to void. Patient is aware that urine specimen is needed. Patient unwilling to try at this time. Urinal at bedside to assist in collection.

## 2018-02-05 NOTE — ED PROVIDER NOTES
HPI Comments: 80 y.o. male with past medical history significant for A-fib, peripheral neuropathy, DM, TIA, stroke, meniere disease, acute confusion, and HTN who presents from [facility] via EMS with chief complaint of fever. EMS reports that the pt's family had been unable to get in touch with the pt and contacted his facility. Nursing staff visited the pt and found him in bed with a fever of 100.4 F. Pt was also noted to be less talkative and more lethargic than normal. Pt has a h/o PNA and is noted to be wheezing. Pt himself has no current complaints and denies all of the following: congestion, cough, SOB, appetite change, N/V/D, and difficulty urinating. Pt additionally denies having any pain complaints. There are no other acute medical concerns at this time. Social hx: never smoker. Weekly alcohol use. PCP: Sharmaine Boeck, MD    Note written by Jass Vuong, as dictated by Lauren Beal MD 2:21 PM     The history is provided by the patient and the EMS personnel. History limited by: poor historian. No  was used.         Past Medical History:   Diagnosis Date    Acute confusion 9/29/2012    Atrial fibrillation ([address]     CHRONIC    Borderline diabetes mellitus     CKD (chronic kidney disease)     Diet-controlled type 2 diabetes mellitus ([address]     Dyslipidemia     Hypertension     Hypertensive cardiovascular disease     Meniere disease     Mitral regurgitation     echo 3-27-07    Neurological disorder     dizziness, tilt table test 5-4-07 unremarkable    NPH (normal pressure hydrocephalus)     Shunt  [facility]     Orthostatic hypotension     Other ill-defined conditions(799.89)     planter facia,2 achilles    Peripheral neuropathy     Stroke ([address]     4 TIA    TIA (transient ischemic attack)        Past Surgical History:   Procedure Laterality Date    HX APPENDECTOMY      HX CHOLECYSTECTOMY      HX HERNIA REPAIR      HX ORTHOPAEDIC      Left tkr,,rotator cuff    HX TURP           Family History:   Problem Relation Age of Onset    Heart Disease Father        Social History     Social History    Marital status:      Spouse name: N/A    Number of children: N/A    Years of education: N/A     Occupational History    Not on file. Social History Main Topics    Smoking status: Never Smoker    Smokeless tobacco: Never Used    Alcohol use Yes      Comment: weekly    Drug use: No    Sexual activity: Not Currently     Other Topics Concern    Not on file     Social History Narrative    [place]ives [place]         ALLERGIES: Erythromycin; Penicillins; and Tetanus vaccines and toxoid    Review of Systems   Reason unable to perform ROS: Poor historian. Vitals:    02/05/18 1407   Resp: 24   Temp: (!) 102.6 °F (39.2 °C)   SpO2: 92%   Weight: 77.8 kg (171 lb 8.3 oz)   Height: 6' (1.829 m)            Physical Exam   Constitutional: He is oriented to person, place, and time. He appears well-developed and well-nourished. No distress. HENT:   Head: Normocephalic and atraumatic. Nose: Nose normal.   Mouth/Throat: Oropharynx is clear and moist.   Eyes: Conjunctivae and EOM are normal. Pupils are equal, round, and reactive to light. No scleral icterus. Neck: Normal range of motion. Neck supple. No JVD present. No tracheal deviation present. No thyromegaly present. No carotid bruits noted. Cardiovascular: Normal rate, regular rhythm, normal heart sounds and intact distal pulses. Exam reveals no gallop and no friction rub. No murmur heard. Pulmonary/Chest: Effort normal. No respiratory distress. He has wheezes (diffuse expiratory ). He has no rales. He exhibits no tenderness. Abdominal: Soft. Bowel sounds are normal. He exhibits no distension and no mass. There is no tenderness. There is no rebound and no guarding. Musculoskeletal: Normal range of motion. He exhibits no edema or tenderness.    No notable peripheral edema. Lymphadenopathy:     He has no cervical adenopathy. Neurological: He is alert and oriented to person, place, and time. He has normal reflexes. No cranial nerve deficit. Coordination normal.   Skin: Skin is warm and dry. No rash noted. No erythema. There is pallor. Feels warm to the touch. Psychiatric: He has a normal mood and affect. His behavior is normal. Judgment and thought content normal.   Nursing note and vitals reviewed. Note written by Jass Kim, as dictated by Hugo Russell MD 2:25 PM      MDM  Number of Diagnoses or Management Options     Amount and/or Complexity of Data Reviewed  Clinical lab tests: ordered and reviewed  Tests in the radiology section of CPT®: ordered and reviewed  Decide to obtain previous medical records or to obtain history from someone other than the patient: yes  Obtain history from someone other than the patient: yes  Review and summarize past medical records: yes  Discuss the patient with other providers: yes  Independent visualization of images, tracings, or specimens: yes    Risk of Complications, Morbidity, and/or Mortality  Presenting problems: high  Diagnostic procedures: high  Management options: high    Patient Progress  Patient progress: stable        ED Course       Procedures    Patient's labs are noted. The patient is discussed with hospitalist and will require admission for further evaluation and treatment.

## 2018-02-05 NOTE — ED NOTES
Bedside report given to Arianne Mueller RN who will assume care at this time. Report included content from SBAR, [facility], Recent Results and ED Summary. RN aware of physical assessment and resulted/pending lab/radiology studies. A period of questions and answers was afforded.

## 2018-02-05 NOTE — ED TRIAGE NOTES
TRIAGE NOTE; Patient arrives via Ems from independent [facility]. Patient' son has been trying to get a hold of patient for a few days. Raúl Trimble did a house call and found patient in the bed and less talkative then usually.  +wheezing upon arrival.

## 2018-02-05 NOTE — PROGRESS NOTES
TRANSFER - IN REPORT:    Verbal report received from [facility], [address] [place] [address] (name) on Evelio Bound  being received from [facility] (unit) for routine progression of care      Report consisted of patients Situation, Background, Assessment and   Recommendations(SBAR). Information from the following report(s) ED Summary was reviewed with the receiving nurse. Opportunity for questions and clarification was provided. Assessment completed upon patients arrival to unit and care assumed.

## 2018-02-06 PROBLEM — J10.1 INFLUENZA A: Status: ACTIVE | Noted: 2018-01-01

## 2018-02-06 NOTE — PROGRESS NOTES
Bedside shift change report given to Andrade Jaffe RN  (oncoming nurse) by Cleo Yin RN  (offgoing nurse). Report included the following information SBAR and MAR.

## 2018-02-06 NOTE — PROGRESS NOTES
Bedside shift change report given to JOSE RAMON Vega (oncoming nurse) by Duc Joel RN (offgoing nurse). Report included the following information SBAR, Kardex, ED Summary, Intake/Output, MAR, Accordion and Recent Results.

## 2018-02-06 NOTE — PROGRESS NOTES
PSBU alerted unit pt's HR - fluctuations of 150 to 137, Dr. Ronda Sanford notified. Stat EkG done. Continued under observation for changes.

## 2018-02-06 NOTE — PROGRESS NOTES
Problem: Falls - Risk of  Goal: *Absence of Falls  Document Nancy Fall Risk and appropriate interventions in the flowsheet.    Outcome: Progressing Towards Goal  Fall Risk Interventions:  Mobility Interventions: Bed/chair exit alarm    Mentation Interventions: Bed/chair exit alarm    Medication Interventions: Bed/chair exit alarm    Elimination Interventions: Call light in reach

## 2018-02-06 NOTE — CONSULTS
[address]    Michael Haddad  MR#: [id number]  : 1923  ACCOUNT #: [de-identified]   DATE OF SERVICE: 2018    Impression:  1. Chronic atrial fibrillation now with an increase in rate due secondary respiratory illness with positive for the flu. An EKG is done and shows atrial fibrillation with left axis deviation, right bundle branch block, bifascicular block, the possibility of previous inferior infarction cannot be excluded. His echo in December showed small wall motion, but overall well preserved function. He has no chest pain. 2.  Troponin mildly elevated, nonspecific in this clinical setting of rapid atrial fibrillation with the lack of chest pain and the fact that he would not really be cleared to go for invasive procedures, I do not think any further troponins need to be checked. 3.  Follow up on 2D echocardiogram.    4.  Goal is rate control. Discussed with Dr. Surendra Narayanan. He has increased the patient's diltiazem to 180 and he is on the digoxin. I would suspect we could make that diltiazem for now b.i.d., normally CV drug would not be b.i.d., but in this particular case it may be helpful to change over to q.6h. dosing and try to get even a little bit higher until we figure out what his respiratory does, followed by changing him to the diltiazem IR at 90 mg a.c. at bedtime. I suspect as his flu resolves, his AFib rate will continue to improve. We will follow closely with you. REQUESTING PHYSICIAN:  I am asked by Dr. Camden Pathak to see the patient for elevated troponin this morning. He is also noted to have a wide complex tachycardia which we believe to be atrial fibrillation with unknown bundle branch block. The patient was admitted basically for respiratory distress, suspect pneumonia or flu. He currently denies chest pain, chest pressure, has not had recent lower extremity edema.   Says he does not really recall that he was getting more short of breath, but his family brought him in because they thought he had been. He was admitted in November with pneumonia. He says he had a  shunt done about 9 years ago. His last visit with me was 2011. At that time, he already had the shunt for his normal pressure hydrocephalus that was five years ago. His echocardiogram  showed normal LV function. He has known history of chronic atrial fibrillation, hypertensive cardiovascular disease, borderline diabetes, previous TIA, mitral regurgitation. At the time that he was seen in , he had well rate controlled atrial fibrillation and blood pressure. The patient has been admitted since then, his echocardiogram 2017 showed an EF of 55% to 60%, moderate hypokinesis of the apical anterior wall, mildly dilated right ventricle, moderately dilated atria, mild to moderate tricuspid regurgitation. MEDICATIONS:  Prior to admission cardiac medications included diltiazem 120 a day, digoxin 0.125 in the morning and in the evenings on Monday, Wednesday, Thursday, Saturday; amlodipine 5 mg a day. Zocor 20 mg a day. PRIMARY CARE PHYSICIAN:  Sees Dr. Jennifer aMdden as his primary care physician. PAST MEDICAL HISTORY:  Reviewed above. has a past medical history of Acute confusion (2012); Atrial fibrillation ([address] Borderline diabetes mellitus; CKD (chronic kidney disease); Diet-controlled type 2 diabetes mellitus ([address] Dyslipidemia; Hypertension; Hypertensive cardiovascular disease; Meniere disease; Mitral regurgitation; Neurological disorder; NPH (normal pressure hydrocephalus) (); Orthostatic hypotension; Other ill-defined conditions(799.89); Peripheral neuropathy; Stroke [facility]); and TIA (transient ischemic attack). PAST SURGICAL HISTORY:  Includes a TURP, left total knee replacement, rotator cuff surgery, hernia repair, cholecystectomy, appendectomy.   FAMILY HISTORY:  Father is  from heart disease and all the men on his father's side had heart disease. SOCIAL HISTORY:  He is a nonsmoker, never smoked. ALLERGIES:  INCLUDE ERYTHROMYCIN, PENICILLIN, TETANUS. MEDICATIONS:  Prior to admission are reviewed. Prescriptions Prior to Admission   Medication Sig    cranberry-vitamin c-vitamin e (CRANBERRY PLUS VITAMIN C) 140-100 mg cap Take 2 Caps by mouth daily.  dilTIAZem CD (CARDIZEM CD) 120 mg ER capsule Take 1 Cap by mouth daily.  Lactobacillus Acidoph & Bulgar (FLORANEX) 1 million cell tab tablet Take 2 Tabs by mouth two (2) times a day.  aspirin delayed-release 81 mg tablet Take 1 Tab by mouth daily.  digoxin (LANOXIN) 0.125 mg tablet Take 0.125 mg by mouth daily. Daily in the AM and in the evenings on Mon, Wed, Thurs, Sat (see additional order)    DULoxetine (CYMBALTA) 30 mg capsule Take 30 mg by mouth nightly.  glipiZIDE SR (GLUCOTROL XL) 2.5 mg CR tablet Take 2.5 mg by mouth daily.  levothyroxine (SYNTHROID) 100 mcg tablet Take 100 mcg by mouth Daily (before breakfast).  fish oil-omega-3 fatty acids 340-1,000 mg capsule Take 1 Cap by mouth two (2) times a day. Lunch and Bedtime    raNITIdine (ZANTAC) 300 mg tablet Take 300 mg by mouth nightly.  simethicone (MYLICON) 80 mg chewable tablet Take 80 mg by mouth three (3) times daily. Indications: FLATULENCE    linagliptin (TRADJENTA) 5 mg tablet Take 5 mg by mouth daily.  traZODone (DESYREL) 100 mg tablet Take 200 mg by mouth nightly.  cholecalciferol, VITAMIN D3, (VITAMIN D3) 5,000 unit tab tablet Take 5,000 Units by mouth daily (with lunch).  clindamycin (CLEOCIN) 300 mg capsule Take 600 mg by mouth once as needed (Prior to dental procedures).  pregabalin (LYRICA) 200 mg capsule Take 400 mg by mouth nightly.  pramipexole (MIRAPEX) 0.125 mg tablet Take 0.125 mg by mouth nightly.  amLODIPine (NORVASC) 5 mg tablet Take 5 mg by mouth daily.  finasteride (PROSCAR) 5 mg tablet Take 5 mg by mouth daily.     simvastatin (ZOCOR) 20 mg tablet Take 20 mg by mouth nightly.  digoxin (LANOXIN) 0.125 mg tablet Take 0.125 mg by mouth four (4) days a week. Daily in the AM and in the evenings on Mon, Wed, Thurs, Sat (see additional order)    tamsulosin (FLOMAX) 0.4 mg capsule Take 0.4 mg by mouth nightly.  omeprazole (PRILOSEC) 20 mg capsule Take 20 mg by mouth Daily (before breakfast). PHYSICAL EXAMINATION:    VITAL SIGNS:  Respiratory rate is 20, pulse is 110-147, blood pressure 154/75, sats 96% on room air. HEENT:  Normocephalic, atraumatic. Anicteric sclerae. NECK:  No JVD. LUNGS:  Clear lungs. HEART:  Irregularly irregular tachycardia without murmur, rub or gallop. ABDOMEN:  Soft. EXTREMITIES:  Show no edema. NEUROLOGIC:  Grossly afocal.  MUSCULOSKELETAL:  Moves all 4 extremities. SKIN:  No obvious diaphoresis. LABORATORY DATA:  White count is 4.5, hemoglobin 14.6, platelet count 948. Sodium 138, K 3.5, bicarbonate 26, BUN 23, creatinine 1.4, calcium 9.1, AST 25, ALT 22. One troponin was o. 10. Digoxin level is 0.8. Blood culture has been ordered. He is positive for influenza A.  CT of the chest on 02/05/2018 showed trace bilateral pleural fluid. Chest x-ray 02/05/2018 showed developing interstitial edema. MRI of the brain 01/15/2018 showed no changes since 11/30/2017 in the shunt catheter position.         MD JORDAN Junior / TERRI  D: 02/06/2018 08:56     T: 02/06/2018 10:00  JOB #: [id number]

## 2018-02-06 NOTE — CONSULTS
Full note dictated  Robert Hoffmann is a 80 y.o. male   With   New flu A  chronic atrial fibrillation   Now with rapid rate  EKG confirms, this is not VT but rather the AF with RVR and bifasicular block   Pt denies CP  Mild SOB  Appears stable hemodynamically  Exam notable for Irregularly irregular rhythm, coarse but no wheeze, no edema    Data notable for   Lab Results   Component Value Date/Time    Creatinine (POC) 1.3 03/27/2010 10:08 AM    Creatinine 1.43 02/06/2018 05:20 AM     Dig level 0.8  Lab Results   Component Value Date/Time    Hemoglobin (POC) 14.6 03/27/2010 10:08 AM    HGB 14.6 02/06/2018 05:20 AM        chronic atrial fibrillation with RVR  Increased dilt to 90 mg q 6  Continue digoxin  Trop , test no further as no angina, not appropriate for invasive procedures given age, NPH, frailty  Echo last month with small wall motion abnormality and normal EF  I last saw him in 2011 with stable AF for many years  thanks

## 2018-02-06 NOTE — PROGRESS NOTES
David Whiting MD   Phone/text: [phone] (7am to 7pm)  After 7pm please call  for hospitalist on call    Hospitalist Progress Note     2/6/2018   PCP:  Dr. Alla Brewer MD  Chief Complaint   Patient presents with    Fever    Wheezing       Admission Summary:   Patient is a 68-year-old gentleman with past medical history of atrial fibrillation, history of NPH with  shunt, history of abnormal EKG, hypertension, peripheral neuropathy, borderline diabetes, dyslipidemia, Meniere disease,  sensorineural hearing loss, orthostatic hypotension, history of CVA, hypertensive cardiovascular disease, CKD, mitral regurgitation and recurrent falls, who presents to the hospital with fever. Reason For Visit:  Influenza    Assessment/Plan   Influenza A (POA)  - CT chest 2/5 with trace bilateral fluid, no infiltrate but limited by respiratory artifact  - Continue oseltamivir started 2/5  - Continue cefepime for 1 more day but can stop tomorrow if he remains fever free    Wheezing (POA) - due to flu  - Change duonebs to ipratroprium alone  - Monitor    Paroxysmal Afib with RVR - appears to have aberrancy, likely being drive by the flu  - Continue telemetry  - Obtain ECG  - Change duonebs to ipratroprium alone  - Increase diltiazem, stop amlodipine (2 ccb). Continue digoxin  - Continue aspirin, not on OAC due to frequent falls    CKD3 - renal function stable, monitor    Troponin elevation - likely due to strain from flu, afib on top of CKD  - Continue aspirin  - Rate control as above  - Obtain echo, ECG  - Cardiology consulted    Thrombocytopenia - unclear cause, no acute bleeding, monitor    DM2 (chronic) - check A1c  - Restart linagliptin, glipizide  - SSI as needed    Hypothyroid (chronic) - continue home synthroid    See orders for other plans.   VTE prophylaxis: enoxaparin  Discussed plan of care with Patient/Family, Nurse and    Pre-admission lived at home  Discharge plan: home  Estimated time to discharge: 2-3 days     Subjective   Mr. Kiya Miller is feeling fair. He is still a bit wheezy. No SOB at rest. He denies dizziness, chest pain, palpitations. Reviewed interval history  Physical examination     Visit Vitals    /75    Pulse (!) 120    Temp 98 °F (36.7 °C)    Resp 20    Ht 6' (1.829 m)    Wt 77.8 kg (171 lb 8.3 oz)    SpO2 96%    BMI 23.26 kg/m2       Temp (24hrs), Av.7 °F (37.6 °C), Min:97.8 °F (36.6 °C), Max:102.6 °F (39.2 °C)      Intake/Output Summary (Last 24 hours) at 18 0849  Last data filed at 18 0552   Gross per 24 hour   Intake                0 ml   Output              250 ml   Net             -250 ml       Gen - NAD  HEENT - MMM  Neck - supple, full ROM  CV - tachycardic, irregular, no MRG  Resp - lungs with diffuse wheezing, normal WOB  GI - abdomen S, NT, ND, +BS. No hepatosplenomegaly   - no CVA tenderness, bladder non-palpable in lower abdomen  MSK - normal tone and bulk, no edema  Neuro - A&O, no focal deficits  Psych - calm and cooperative with normal affect    Data Review       Telemetry x   ECG x   Xray    CT scan x   MRI    Echocardiogram    Ultrasound              I have reviewed the flow sheet and recent notes  New labs and data personally reviewed.     Recent Labs      18   0520  18   1416   WBC  4.5  7.1   HGB  14.6  14.1   HCT  45.0  43.6   PLT  131*  135*     Recent Labs      18   0520  18   1416   NA  138  137   K  3.5  3.9   CL  101  102   CO2  26  28   GLU  133*  127*   BUN  23*  19   CREA  1.43*  1.50*   CA  9.1  8.9   ALB  3.3*  3.2*   TBILI  1.0  0.8   SGOT  25  21   ALT  22  21       Medications reviewed  Current Facility-Administered Medications   Medication Dose Route Frequency    ipratropium (ATROVENT) 0.02 % nebulizer solution 0.5 mg  0.5 mg Nebulization Q4H RT    dilTIAZem CD (CARDIZEM CD) capsule 180 mg  180 mg Oral DAILY    digoxin (LANOXIN) tablet 0.125 mg  0.125 mg Oral DAILY    DULoxetine (CYMBALTA) capsule 30 mg  30 mg Oral QHS    finasteride (PROSCAR) tablet 5 mg  5 mg Oral DAILY    levothyroxine (SYNTHROID) tablet 100 mcg  100 mcg Oral ACB    pramipexole (MIRAPEX) tablet 0.125 mg  0.125 mg Oral QHS    pregabalin (LYRICA) capsule 400 mg  400 mg Oral QHS    simvastatin (ZOCOR) tablet 20 mg  20 mg Oral QHS    tamsulosin (FLOMAX) capsule 0.4 mg  0.4 mg Oral QHS    sodium chloride (NS) flush 5-10 mL  5-10 mL IntraVENous Q8H    sodium chloride (NS) flush 5-10 mL  5-10 mL IntraVENous PRN    0.9% sodium chloride infusion  75 mL/hr IntraVENous CONTINUOUS    acetaminophen (TYLENOL) tablet 650 mg  650 mg Oral Q4H PRN    glucose chewable tablet 16 g  4 Tab Oral PRN    dextrose (D50W) injection syrg 12.5-25 g  12.5-25 g IntraVENous PRN    glucagon (GLUCAGEN) injection 1 mg  1 mg IntraMUSCular PRN    insulin lispro (HUMALOG) injection   SubCUTAneous Q6H    cefepime (MAXIPIME) 2 g in 0.9% sodium chloride (MBP/ADV) 100 mL  2 g IntraVENous Q12H    hydrALAZINE (APRESOLINE) 20 mg/mL injection 20 mg  20 mg IntraVENous Q6H PRN    aspirin chewable tablet 81 mg  81 mg Oral DAILY    pantoprazole (PROTONIX) tablet 40 mg  40 mg Oral ACB    famotidine (PEPCID) tablet 40 mg  40 mg Oral QHS    oseltamivir (TAMIFLU) 6 mg/mL oral suspension 30 mg  30 mg Oral BID         Clyde Santos MD  Internal Medicine  2/6/2018

## 2018-02-06 NOTE — PROGRESS NOTES
Primary Nurse Ayse Juarez, RN and Thaddeus Corrigan, RN, RN performed a dual skin assessment on this patient No impairment noted  Brendan score is 19

## 2018-02-06 NOTE — PROGRESS NOTES
Pt with elevated trop  On ASA, ordered serial enzymes and ECHO  No c/o CP per patient  Spoke with Dr. Azeb Hubbard  Monitor on telemetry

## 2018-02-07 NOTE — CDMP QUERY
\" The H&P notes \" Concern for Pneumoina\", however, it is not noted in subsequent documentation. Please clarify if this condition was:    ? Treated & resolved  ? Ongoing/Improving  ? Ruled Out  ? Other Explanation of the Clinical Findings  ? Unable to Determine (no explanation for clinical findings)    The medical record reflects the following clinical findings, risk factors and treatment:     Risk Factors:  + Flu, Hx. Of CVA, Nursing home resident  Clinical Indicators:  Wheezing, SOB,  Hypoxic, CT of the chest with trace susy. Lfuid. No  infiltrate but limeted by resp. Artifact. Treatments:IV Maxipeme, CTA of chest, Duonebs, cont. Pulse o2 monitoring    Please clarify and document your clinical opinion in the progress notes and discharge summary including the definitive and/or presumptive diagnosis, (suspected or probable), related to the above clinical findings. Please include clinical findings supporting your diagnosis.       Thank you,  Chicho Spencer, REBEKAHN, RN, [address]  [phone]

## 2018-02-07 NOTE — PROGRESS NOTES
Janina Tello MD   Phone/text: [phone] (7am to 7pm)  After 7pm please call  for hospitalist on call    Hospitalist Progress Note     2/7/2018   PCP:  Dr. Irlanda Stock MD  Chief Complaint   Patient presents with    Fever    Wheezing       Admission Summary:   Patient is a 66-year-old gentleman with past medical history of atrial fibrillation, history of NPH with  shunt, history of abnormal EKG, hypertension, peripheral neuropathy, borderline diabetes, dyslipidemia, Meniere disease,  sensorineural hearing loss, orthostatic hypotension, history of CVA, hypertensive cardiovascular disease, CKD, mitral regurgitation and recurrent falls, who presents to the hospital with fever. Reason For Visit:  Influenza    Assessment/Plan   Influenza A (POA) - improving  - CT chest 2/5 with trace bilateral fluid, no infiltrate but limited by respiratory artifact  - Continue oseltamivir started 2/5  - Stop cefepime    Wheezing (POA) - due to flu, improved but still a bit wheezy  - Changed duonebs to ipratroprium alone  - Monitor    Paroxysmal Afib with RVR - appears to have aberrancy, likely being drive by the flu. Rate improved and complex narrow now  - Continue telemetry  - Changed duonebs to ipratroprium alone  - Increased diltiazem, stop amlodipine (2 ccb). Continue digoxin  - Continue aspirin, not on OAC due to frequent falls    CKD3 - renal function stable, monitor    Troponin elevation - likely due to strain from flu, afib on top of CKD  - Echo 2/6 with LVEF 55-60%, no WMA, wall thickness mildly increased, mild MR  - Continue aspirin  - Rate control as above  - Cardiology consulted    Thrombocytopenia - unclear cause, no acute bleeding, monitor    DM2 (chronic) - A1c 6.3, which is a bit low for a 80year old  - Restart linagliptin, stop glipizide  - SSI as needed    Hypothyroid (chronic) - continue home synthroid    See orders for other plans.   VTE prophylaxis: enoxaparin  Discussed plan of care with Patient/Family, Nurse and    Pre-admission lived at home  Discharge plan: home  Estimated time to discharge: 1-2 days     Subjective   Mr. Celestino Bowman is feeling fine. Still a bit fatigued and wheezy. No chest pain, palpitations, SOB. Reviewed interval history  Physical examination     Visit Vitals    /80 (BP 1 Location: Right arm, BP Patient Position: At rest)    Pulse 85    Temp 98.7 °F (37.1 °C)    Resp 18    Ht 6' (1.829 m)    Wt 77.8 kg (171 lb 8.3 oz)    SpO2 94%    BMI 23.26 kg/m2       Temp (24hrs), Av.3 °F (36.8 °C), Min:97.5 °F (36.4 °C), Max:98.7 °F (37.1 °C)      Intake/Output Summary (Last 24 hours) at 18 1046  Last data filed at 18 1855   Gross per 24 hour   Intake                0 ml   Output              770 ml   Net             -770 ml       Gen - NAD  HEENT - MMM  Neck - supple, full ROM  CV - regular rate, irregular rhythm, no MRG  Resp - lungs with diffuse wheezing, normal WOB  GI - abdomen S, NT, ND, +BS. No hepatosplenomegaly   - no CVA tenderness, bladder non-palpable in lower abdomen  MSK - normal tone and bulk, no edema  Neuro - A&O, no focal deficits  Psych - calm and cooperative with normal affect    Data Review       Telemetry x   ECG    Xray    CT scan    MRI    Echocardiogram    Ultrasound              I have reviewed the flow sheet and recent notes  New labs and data personally reviewed.       Avery Johnson MD  Internal Medicine  2018

## 2018-02-07 NOTE — DIABETES MGMT
DTC Progress Note    Recommendations/ Comments: Chart reviewed due to hypoglycemia. Pt with a blood sugar of 79 mg/dl this morning. If appropriate, please consider:  1. Changing correction scale to high sensitivity  2. Decreasing Januvia to 25 mg    Current hospital DM medication: correction scale Humalog, normal sensitivity and Januvia 50 mg. Chart reviewed on [facility]. Patient is a 80 y.o. male with known diabetes on Glipizide 2.5 mg and Tradjenta 5 mg  at home. A1c:   Lab Results   Component Value Date/Time    Hemoglobin A1c 6.3 02/07/2018 03:51 AM    Hemoglobin A1c 5.9 11/29/2017 01:37 PM       Recent Glucose Results: Lab Results   Component Value Date/Time    GLU 83 02/07/2018 03:51 AM    GLUCPOC 95 02/07/2018 07:13 AM    GLUCPOC 79 02/07/2018 06:44 AM    GLUCPOC 134 (H) 02/06/2018 09:41 PM        Lab Results   Component Value Date/Time    Creatinine 1.37 (H) 02/07/2018 03:51 AM     Estimated Creatinine Clearance: 36.2 mL/min (based on Cr of 1.37). Active Orders   Diet    DIET CARDIAC Regular; Consistent Carb 2000kcal        PO intake: Patient Vitals for the past 72 hrs:   % Diet Eaten   02/06/18 0832 95 %       Will continue to follow as needed.     Thank you  Wendy Tovar RD, CDE

## 2018-02-07 NOTE — PROGRESS NOTES
Found patient sitting on edge of bed with bed alarm on but did not alarm because he had his hand pressed down on the alarm. Patient's IV was pulled out and he was actively pulling his telemetry stickers off his chest.  Asked the patient what he was doing and he stated he was going to the bathroom. Reminded him of the importance of safety and calling out for assistance when he needed help tot he bathroom.

## 2018-02-07 NOTE — PROGRESS NOTES
Cardiology Progress Note            Admit Date: 2/5/2018  Admit Diagnosis: Fever  Influenza A  Date: 2/7/2018     Time: 10:35 AM    Subjective:  Denies CP, SOB or palpitations. Feeling much better today     Assessment and Plan     1. Afib, chronic - rate controlled mid 70s-80s   - RVR driven by Flu   - Continue Dig, Dilt and Lovenox  2. Influenza A   - per Primary team  3. CKD   - stage III - follow labs  4. DM II   - Home meds  5. Hypothyroidism   - home synthroid    Improved from symptoms standpoint related to Flu. HR now controlled on home med regimen. Continue current cardiac therapies and advanced back to all home meds as able. Needs no further cardiac testing at this time. Will see again as needed. Assessment/Plan/Discussion:Cardiology Attending:     Patient seen earlier today and examined  and agree with Advance Practice Provider (KUSH, NP,PA)  assessment and plans. Eufemia Smiht is a 80 y.o. male   Limber and alert , mindfulness  No complaints or CP  Less SOB  Remains in AFib chronic with good rate control  Continue meds for rate  Will defer to good care of Hospitalist/Internal Medicine team and see back PRN    Jero Sommers MD 2/7/2018              ROS:  A comprehensive review of systems was negative except for that written in the HPI. Objective:      Physical Exam:                Visit Vitals    /80 (BP 1 Location: Right arm, BP Patient Position: At rest)    Pulse 85    Temp 98.7 °F (37.1 °C)    Resp 18    Ht 6' (1.829 m)    Wt 171 lb 8.3 oz (77.8 kg)    SpO2 94%    BMI 23.26 kg/m2        General Appearance:   Well developed, well nourished,alert and oriented x 3, and   individual in no acute distress. Ears/Nose/Mouth/Throat:    Hearing grossly normal.         Neck:  Supple. Chest:    Lungs coarse BS to auscultation bilaterally.    Cardiovascular:   Irregular rate and rhythm, S1, S2 normal, no murmur. Abdomen:    Soft, non-tender, bowel sounds are active. Extremities:  No edema bilaterally. Skin:  Warm and dry. Telemetry: AFIB          Data Review:    Labs:    Recent Results (from the past 24 hour(s))   GLUCOSE, POC    Collection Time: 02/06/18 12:42 PM   Result Value Ref Range    Glucose (POC) 159 (H) 65 - 100 mg/dL    Performed by Theodore Escamilla (PCT)    GLUCOSE, POC    Collection Time: 02/06/18  5:15 PM   Result Value Ref Range    Glucose (POC) 91 65 - 100 mg/dL    Performed by Theodore Escamilla (PCT)    GLUCOSE, POC    Collection Time: 02/06/18  9:41 PM   Result Value Ref Range    Glucose (POC) 134 (H) 65 - 100 mg/dL    Performed by Theodore Escamilla ([facility])    CBC W/O DIFF    Collection Time: 02/07/18  3:51 AM   Result Value Ref Range    WBC 4.3 4.1 - 11.1 K/uL    RBC 4.20 4. 10 - 5.70 M/uL    HGB 13.1 12.1 - 17.0 g/dL    HCT 40.4 36.6 - 50.3 %    MCV 96.2 80.0 - 99.0 FL    MCH 31.2 26.0 - 34.0 PG    MCHC 32.4 30.0 - 36.5 g/dL    RDW 15.3 (H) 11.5 - 14.5 %    PLATELET 209 (L) 026 - 400 K/uL    MPV 11.8 8.9 - 12.9 FL    NRBC 0.0 0  WBC    ABSOLUTE NRBC 0.00 0.00 - 7.14 K/uL   METABOLIC PANEL, BASIC    Collection Time: 02/07/18  3:51 AM   Result Value Ref Range    Sodium 136 136 - 145 mmol/L    Potassium 3.9 3.5 - 5.1 mmol/L    Chloride 103 97 - 108 mmol/L    CO2 25 21 - 32 mmol/L    Anion gap 8 5 - 15 mmol/L    Glucose 83 65 - 100 mg/dL    BUN 25 (H) 6 - 20 MG/DL    Creatinine 1.37 (H) 0.70 - 1.30 MG/DL    BUN/Creatinine ratio 18 12 - 20      GFR est AA 59 (L) >60 ml/min/1.73m2    GFR est non-AA 48 (L) >60 ml/min/1.73m2    Calcium 8.5 8.5 - 10.1 MG/DL   HEMOGLOBIN A1C WITH EAG    Collection Time: 02/07/18  3:51 AM   Result Value Ref Range    Hemoglobin A1c 6.3 4.2 - 6.3 %    Est. average glucose 134 mg/dL   GLUCOSE, POC    Collection Time: 02/07/18  6:44 AM   Result Value Ref Range    Glucose (POC) 79 65 - 100 mg/dL    Performed by Adolfo Greenfield, POC    Collection Time: 02/07/18 7:13 AM   Result Value Ref Range    Glucose (POC) 95 65 - 100 mg/dL    Performed by [facility]           Radiology:        Current Facility-Administered Medications   Medication Dose Route Frequency    ipratropium (ATROVENT) 0.02 % nebulizer solution 0.5 mg  0.5 mg Nebulization Q4H RT    lactobac ac& pc-s.therm-b.anim (FIDENCIO Q/RISAQUAD)  1 Cap Oral DAILY    dilTIAZem (CARDIZEM) IR tablet 90 mg  90 mg Oral AC&HS    glipiZIDE SR (GLUCOTROL XL) tablet 2.5 mg  2.5 mg Oral DAILY    SITagliptin (JANUVIA) tablet tab 50 mg  50 mg Oral DAILY    insulin lispro (HUMALOG) injection   SubCUTAneous AC&HS    enoxaparin (LOVENOX) injection 40 mg  40 mg SubCUTAneous Q24H    digoxin (LANOXIN) tablet 0.125 mg  0.125 mg Oral DAILY    DULoxetine (CYMBALTA) capsule 30 mg  30 mg Oral QHS    finasteride (PROSCAR) tablet 5 mg  5 mg Oral DAILY    levothyroxine (SYNTHROID) tablet 100 mcg  100 mcg Oral ACB    pramipexole (MIRAPEX) tablet 0.125 mg  0.125 mg Oral QHS    pregabalin (LYRICA) capsule 400 mg  400 mg Oral QHS    simvastatin (ZOCOR) tablet 20 mg  20 mg Oral QHS    tamsulosin (FLOMAX) capsule 0.4 mg  0.4 mg Oral QHS    sodium chloride (NS) flush 5-10 mL  5-10 mL IntraVENous Q8H    sodium chloride (NS) flush 5-10 mL  5-10 mL IntraVENous PRN    0.9% sodium chloride infusion  75 mL/hr IntraVENous CONTINUOUS    acetaminophen (TYLENOL) tablet 650 mg  650 mg Oral Q4H PRN    glucose chewable tablet 16 g  4 Tab Oral PRN    dextrose (D50W) injection syrg 12.5-25 g  12.5-25 g IntraVENous PRN    glucagon (GLUCAGEN) injection 1 mg  1 mg IntraMUSCular PRN    cefepime (MAXIPIME) 2 g in 0.9% sodium chloride (MBP/ADV) 100 mL  2 g IntraVENous Q12H    hydrALAZINE (APRESOLINE) 20 mg/mL injection 20 mg  20 mg IntraVENous Q6H PRN    aspirin chewable tablet 81 mg  81 mg Oral DAILY    pantoprazole (PROTONIX) tablet 40 mg  40 mg Oral ACB    famotidine (PEPCID) tablet 40 mg  40 mg Oral QHS    oseltamivir (TAMIFLU) 6 mg/mL oral suspension 30 mg  30 mg Oral BID          MELANIA Kemp MD      [facility] [address] [place] [address] Drive, [address] [place] [address],[address]   Miguelina Collier   [phone]

## 2018-02-08 NOTE — PROGRESS NOTES
Problem: Mobility Impaired (Adult and Pediatric)  Goal: *Acute Goals and Plan of Care (Insert Text)  Physical Therapy Goals  Initiated 2/8/2018  1. Patient will move from supine to sit and sit to supine  in bed with supervision/set-up within 7 day(s). 2.  Patient will transfer from bed to chair and chair to bed with supervision/set-up using the least restrictive device within 7 day(s). 3.  Patient will perform sit to stand with supervision/set-up within 7 day(s). 4.  Patient will ambulate with supervision/set-up for 150 feet with the least restrictive device within 7 day(s). physical Therapy EVALUATION  Patient: Eufemia Smith (89 y.o. male)  Date: 2/8/2018  Primary Diagnosis: Fever  Influenza A        Precautions: Droplet - flu,  Fall, DNR    ASSESSMENT :  Based on the objective data described below, the patient presents with confusion, impulsivity, decreased safety awareness, decreased strength, and decreased endurance limiting patient's safe functional mobility. At baseline, patient lives alone in independent living at [facility] and ambulates with rollator. Patient very confused since being admitted to the hospital, constantly trying to get OOB on his arm despite bed alarm. Patient required min assist x 2 for mobility this date. Patient ambulated 15 ft x 2 using RW with unsteady gait. Patient jerking RW and hitting it on the ground repeatedly during mobility, says this is due to his neuropathy but appeared frustrated with use of RW. Patient returned to bed at end of session, bed alarm on. Patient will need SNF at discharge as patient is not safe to return to independent living due to impulsivity, high risk for falls, and decreased strength/endurance. Will continue to follow. Patient will benefit from skilled intervention to address the above impairments.   Patients rehabilitation potential is considered to be Fair  Factors which may influence rehabilitation potential include:   [] None noted  [x]         Mental ability/status  [x]         Medical condition  [x]         Home/family situation and support systems  [x]         Safety awareness  []         Pain tolerance/management  []         Other:      PLAN :  Recommendations and Planned Interventions:  [x]           Bed Mobility Training             []    Neuromuscular Re-Education  [x]           Transfer Training                   []    Orthotic/Prosthetic Training  [x]           Gait Training                         []    Modalities  [x]           Therapeutic Exercises           []    Edema Management/Control  [x]           Therapeutic Activities            [x]    Patient and Family Training/Education  []           Other (comment):    Frequency/Duration: Patient will be followed by physical therapy  5 times a week to address goals. Discharge Recommendations: Shakir Asher  Further Equipment Recommendations for Discharge: none     SUBJECTIVE:   Patient stated This walker doesn't move well!     OBJECTIVE DATA SUMMARY:   HISTORY:    Past Medical History:   Diagnosis Date    Acute confusion 9/29/2012    Atrial fibrillation ([address]     CHRONIC    Borderline diabetes mellitus     CKD (chronic kidney disease)     Diet-controlled type 2 diabetes mellitus ([address]     Dyslipidemia     Hypertension     Hypertensive cardiovascular disease     Meniere disease     Mitral regurgitation     echo 3-27-07    Neurological disorder     dizziness, tilt table test 5-4-07 unremarkable    NPH (normal pressure hydrocephalus)     Shunt Dr Alejandro Villegas [facility]     Orthostatic hypotension     Other ill-defined conditions(799.89)     planter facia,2 achilles    Peripheral neuropathy     Stroke ([address]     4 TIA    TIA (transient ischemic attack)      Past Surgical History:   Procedure Laterality Date    HX APPENDECTOMY      HX CHOLECYSTECTOMY      HX HERNIA REPAIR      HX ORTHOPAEDIC      Left tkr,,rotator cuff    HX TURP       Prior Level of Function/Home Situation: lives in [address] at [facility], Veronica Anderson with rollator   Personal factors and/or comorbidities impacting plan of care:     Home Situation  Home Environment: Independent living ([facility])  # Steps to Enter: 0  One/Two Story Residence: One story  Living Alone: Yes  Current DME Used/Available at Home: [address], rollator  Tub or Shower Type: Shower    EXAMINATION/PRESENTATION/DECISION MAKING:   Critical Behavior:  Neurologic State: Alert  Orientation Level: Oriented to person, Disoriented to time, Oriented to place, Oriented to situation  Cognition: Follows commands, Decreased attention/concentration  Safety/Judgement: Awareness of environment, Decreased awareness of need for safety, Decreased insight into deficits, Fall prevention  Hearing: Auditory  Auditory Impairment: Hard of hearing, bilateral, Hearing aid(s)  Hearing Aids/Status: At home  Skin:  Intact   Edema: none   Range Of Motion:  AROM: Generally decreased, functional           PROM: Within functional limits           Strength:    Strength: Generally decreased, functional                    Tone & Sensation:   Tone: Normal              Sensation: Impaired (pt reports bilateral hand and foot neuropathy)               Coordination:  Coordination: Generally decreased, functional  Vision:   Acuity: Able to read clock/calendar on wall without difficulty  Functional Mobility:  Bed Mobility:     Supine to Sit: Minimum assistance        Transfers:  Sit to Stand: Minimum assistance;Assist x2  Stand to Sit: Minimum assistance                       Balance:   Sitting: Intact  Standing: Impaired; With support (RW)  Standing - Static: Fair  Standing - Dynamic : Fair  Ambulation/Gait Training:  Distance (ft): 15 Feet (ft) (15 ft x 2 with seated rest break on commode)  Assistive Device: Gait belt;Walker, rolling  Ambulation - Level of Assistance: Minimal assistance;Assist x2 (due to impulsivity and safety concerns) Gait Description (WDL): Exceptions to WDL  Gait Abnormalities: Decreased step clearance; Path deviations; Shuffling gait        Base of Support: Narrowed     Speed/Christina: Fluctuations  Step Length: Left shortened;Right shortened            Functional Measure:  Barthel Index:    Bathin  Bladder: 5  Bowels: 10  Groomin  Dressin  Feeding: 10  Mobility: 0  Stairs: 0  Toilet Use: 5  Transfer (Bed to Chair and Back): 5  Total: 45       Barthel and G-code impairment scale:  Percentage of impairment CH  0% CI  1-19% CJ  20-39% CK  40-59% CL  60-79% CM  80-99% CN  100%   Barthel Score 0-100 100 99-80 79-60 59-40 20-39 1-19   0   Barthel Score 0-20 20 17-19 13-16 9-12 5-8 1-4 0      The Barthel ADL Index: Guidelines  1. The index should be used as a record of what a patient does, not as a record of what a patient could do. 2. The main aim is to establish degree of independence from any help, physical or verbal, however minor and for whatever reason. 3. The need for supervision renders the patient not independent. 4. A patient's performance should be established using the best available evidence. Asking the patient, friends/relatives and nurses are the usual sources, but direct observation and common sense are also important. However direct testing is not needed. 5. Usually the patient's performance over the preceding 24-48 hours is important, but occasionally longer periods will be relevant. 6. Middle categories imply that the patient supplies over 50 per cent of the effort. 7. Use of aids to be independent is allowed. María Sheikh., Barthel, D.W. ([id number]). Functional evaluation: the Barthel Index. [address] [facility] (14)2. Norval Low carmelo [place], J.J.M.F, Gabby Chen., Mari Vidal., [place], [address] (). Measuring the change indisability after inpatient rehabilitation; comparison of the responsiveness of the Barthel Index and Functional [place] Measure.  Journal of Neurology, Neurosurgery, and Psychiatry, 66(7), 369-239. KATE Morales, DONNA Nichole, & David Chavez M.A. (2004.) Assessment of post-stroke quality of life in cost-effectiveness studies: The usefulness of the Barthel Index and the EuroQoL-5D. Quality of Life Research, 13, 350-09       G codes: In compliance with CMSs Claims Based Outcome Reporting, the following G-code set was chosen for this patient based on their primary functional limitation being treated: The outcome measure chosen to determine the severity of the functional limitation was the Barthel Index with a score of 45/100 which was correlated with the impairment scale. ? Mobility - Walking and Moving Around:     - CURRENT STATUS: CK - 40%-59% impaired, limited or restricted    - GOAL STATUS: CJ - 20%-39% impaired, limited or restricted    - D/C STATUS:  ---------------To be determined---------------       Pain:  Pain Scale 1: Numeric (0 - 10)  Pain Intensity 1: 0              Activity Tolerance:   SpO2 99% on 4L oxygen  SpO2 92% on room air post-activity, visible wheezing  Please refer to the flowsheet for vital signs taken during this treatment. After treatment:   []         Patient left in no apparent distress sitting up in chair  [x]         Patient left in no apparent distress in bed  [x]         Call bell left within reach  [x]         Nursing notified  []         Caregiver present  [x]         Bed alarm activated    COMMUNICATION/EDUCATION:   The patients plan of care was discussed with: Occupational Therapist and Registered Nurse. [x]         Fall prevention education was provided and the patient/caregiver indicated understanding. []         Patient/family have participated as able in goal setting and plan of care. []         Patient/family agree to work toward stated goals and plan of care. []         Patient understands intent and goals of therapy, but is neutral about his/her participation.   [x]         Patient is unable to participate in goal setting and plan of care.     Thank you for this referral.  Rita Schmid, PT   Time Calculation: 23 mins

## 2018-02-08 NOTE — PROGRESS NOTES
Jeanna Hernandez MD   Phone/text: [phone] (7am to 7pm)  After 7pm please call  for hospitalist on call    Hospitalist Progress Note     2/8/2018   PCP:  Dr. Papi Herrera MD  Chief Complaint   Patient presents with    Fever    Wheezing       Admission Summary:   Patient is a 68-year-old gentleman with past medical history of atrial fibrillation, history of NPH with  shunt, history of abnormal EKG, hypertension, peripheral neuropathy, borderline diabetes, dyslipidemia, Meniere disease,  sensorineural hearing loss, orthostatic hypotension, history of CVA, hypertensive cardiovascular disease, CKD, mitral regurgitation and recurrent falls, who presents to the hospital with fever. Reason For Visit:  Influenza    Assessment/Plan   Influenza A (POA) - seems to be improving but I am concerned that the new infiltrates are not pulmonary edmea but worsening viral pneumonia  - CT chest 2/5 with trace bilateral fluid, no infiltrate but limited by respiratory artifact  - Continue oseltamivir started 2/5  - Stopped cefepime    Acute diastolic heart failure - due to IV fluids, worsened 2/8 in the AM  - Echo 2/6 with LVEF 55-60%, no WMA, wall thickness mildly increased, mild MR  - Check BNP  - Stop IV fluids  - IV lasix    Paroxysmal Afib with RVR - appears to have aberrancy, likely being drive by the flu. Rate improved and complex narrow now  - Continue telemetry  - Changed duonebs to ipratroprium alone  - Increased diltiazem, stop amlodipine (2 ccb).  Continue digoxin  - Continue aspirin, not on OAC due to frequent falls    Metabolic encephalopathy - likely due to flu and hypoxia  - Re-orient as able    CKD3 - renal function stable, monitor with diuresis    Troponin elevation - likely due to strain from flu, afib on top of CKD  - Continue aspirin  - Rate control as above  - Cardiology consulted    Thrombocytopenia - unclear cause, no acute bleeding, monitor    DM2 (chronic) - A1c 6.3, which is a bit low for a 80year old  - Stop glipizide and linagliptin  - SSI as needed    Hypothyroid (chronic) - continue home synthroid    See orders for other plans. VTE prophylaxis: enoxaparin  Discussed plan of care with Patient/Family, Nurse and    Pre-admission lived at home  Discharge plan: home  Estimated time to discharge: unclear     Subjective   Mr. Atilio Fernando is feeling worse. He became acutely confused and hypoxic overnight. Today he is sitting up and says that he is feeling fine. He denies SOB even though he is actively wheezing. Denies chest pain, palpitations. Reviewed interval history  Physical examination     Visit Vitals    /74 (BP 1 Location: Right arm, BP Patient Position: Sitting)    Pulse 66    Temp 98.5 °F (36.9 °C)    Resp 16    Ht 6' (1.829 m)    Wt 77.8 kg (171 lb 8.3 oz)    SpO2 95%    BMI 23.26 kg/m2       Temp (24hrs), Av.5 °F (32.5 °C), Min:33.8 °F (1 °C), Max:99 °F (37.2 °C)      Intake/Output Summary (Last 24 hours) at 18 0949  Last data filed at 18 0845   Gross per 24 hour   Intake                0 ml   Output              980 ml   Net             -980 ml       Gen - NAD  HEENT - MMM  Neck - supple, full ROM  CV - regular rate, irregular rhythm, no MRG  Resp - lungs with diffuse wheezing, rales at bases, normal WOB  GI - abdomen S, NT, ND, +BS. No hepatosplenomegaly   - no CVA tenderness, bladder non-palpable in lower abdomen  MSK - normal tone and bulk, no edema  Neuro - A&O, no focal deficits  Psych - calm and cooperative with normal affect    Data Review       Telemetry    ECG    Xray x   CT scan    MRI    Echocardiogram    Ultrasound              I have reviewed the flow sheet and recent notes  New labs and data personally reviewed.       Brian Patel MD  Internal Medicine  2018

## 2018-02-08 NOTE — PROGRESS NOTES
Problem: Self Care Deficits Care Plan (Adult)  Goal: *Acute Goals and Plan of Care (Insert Text)  Occupational Therapy Goals  Initiated 2/8/2018  1. Patient will perform grooming with supervision/set-up in standing >3 minutes within 7 day(s). 2.  Patient will perform upper body dressing with supervision/set-up within 7 day(s). 3.  Patient will perform lower body dressing with supervision/set-up within 7 day(s). 4.  Patient will perform toilet transfers with supervision/set-up to toilet within 7 day(s). 5.  Patient will perform all aspects of toileting with supervision/set-up within 7 day(s). 6.  Patient will participate in upper extremity therapeutic exercise/activities with supervision/set-up for 5 minutes within 7 day(s). Occupational Therapy EVALUATION  Patient: Ramos Solano (76 y.o. male)  Date: 2/8/2018  Primary Diagnosis: Fever  Influenza A        Precautions: Fall, DNR    ASSESSMENT :  Cleared by RN to see pt for therapy session. Based on the objective data described below, the patient presents with decreased balance, endurance and strength, increased oxygen needs, decreased attention/concentration and poor safety awareness following admission for fever/influenza. Pt lives at [address] [place] [address], reports he is previously I with ADLs and has a rollator for functional mobility. Pt presented supine in bed, agreeable to participate, oriented x3 (not oriented to time). Bed mobility performed with min A, good sitting balance at EOB. Pt required min A x2 to stand and throughout mobility for safety. Pt often shaking RW in static stand and required verbal cues to keep hands on hand , reported this was due to his neuropathy. Verbal/tactile cues given for safe use of RW during turns and for sit<>stand, pt with poor motor planning and forgetting to turn RW or not placing hands on handles/using DME safely.  Returned to sitting EOB to perform LB dressing ADL with mod A due to O2 sats reading 85% on during task, although quickly increased to 95% with rest. 4 L O2 reapplied at end of session. In bed with alarm set and call bell in reach at end of session, reminded to call for assistance prior to transferring. Pt is well below his reported functional baseline at this time due to decreased endurance, balance and strength and poor safety awareness, and presents as a fall risk. Pending progress recommend SNF at discharge. Patient will benefit from skilled intervention to address the above impairments. Patients rehabilitation potential is considered to be Good  Factors which may influence rehabilitation potential include:   [x]             None noted  []             Mental ability/status  []             Medical condition  []             Home/family situation and support systems  []             Safety awareness  []             Pain tolerance/management  []             Other:      PLAN :  Recommendations and Planned Interventions:  [x]               Self Care Training                  [x]        Therapeutic Activities  [x]               Functional Mobility Training    []        Cognitive Retraining  [x]               Therapeutic Exercises           [x]        Endurance Activities  [x]               Balance Training                   []        Neuromuscular Re-Education  []               Visual/Perceptual Training     [x]   Home Safety Training  [x]               Patient Education                 [x]        Family Training/Education  []               Other (comment):    Frequency/Duration: Patient will be followed by occupational therapy 5 times a week to address goals. Discharge Recommendations: Skilled Nursing Facility  Further Equipment Recommendations for Discharge: TBD at SNF     SUBJECTIVE:   Patient stated I think someone already used this bathroom.     OBJECTIVE DATA SUMMARY:   HISTORY:   Past Medical History:   Diagnosis Date    Acute confusion 9/29/2012    Atrial fibrillation (HCC)     CHRONIC  Borderline diabetes mellitus     CKD (chronic kidney disease)     Diet-controlled type 2 diabetes mellitus ([address]     Dyslipidemia     Hypertension     Hypertensive cardiovascular disease     Meniere disease     Mitral regurgitation     echo 3-27-07    Neurological disorder     dizziness, tilt table test 5-4-07 unremarkable    NPH (normal pressure hydrocephalus)     Shunt Dr Liyah Kamara MCV     Orthostatic hypotension     Other ill-defined conditions(799.89)     planter facia,2 achilles    Peripheral neuropathy     Stroke ([address]     4 TIA    TIA (transient ischemic attack)      Past Surgical History:   Procedure Laterality Date    HX APPENDECTOMY      HX CHOLECYSTECTOMY      HX HERNIA REPAIR      HX ORTHOPAEDIC      Left tkr,,rotator cuff    HX TURP         Prior Level of Function/Environment/Context: . Pt lives at [address] [place] [address], reports he is previously I with ADLs and has a rollator for functional mobility. Home Situation  Home Environment: Independent living ([facility])  # Steps to Enter: 0  One/Two Story Residence: One story  Living Alone: Yes  Current DME Used/Available at Home: [employer], rollator  Tub or Shower Type: Shower  [x]  Right hand dominant   []  Left hand dominant    EXAMINATION OF PERFORMANCE DEFICITS:  Cognitive/Behavioral Status:  Neurologic State: Alert  Orientation Level: Oriented to person;Disoriented to time;Oriented to place;Oriented to situation  Cognition: Follows commands;Decreased attention/concentration  Perception: Appears intact  Perseveration: No perseveration noted  Safety/Judgement: Awareness of environment;Decreased awareness of need for safety;Decreased insight into deficits; Fall prevention    Hearing: Auditory  Auditory Impairment: Hard of hearing, bilateral, Hearing aid(s)  Hearing Aids/Status:  At home    Vision/Perceptual:         Acuity: Able to read clock/calendar on wall without difficulty         Range of Motion:  AROM: Generally decreased, functional  PROM: Within functional limits        Strength:  Strength: Generally decreased, functional     Coordination:  Coordination: Generally decreased, functional  Fine Motor Skills-Upper: Left Intact; Right Intact    Gross Motor Skills-Upper: Left Intact; Right Intact    Tone & Sensation:  Tone: Normal  Sensation: Impaired (pt reports bilateral hand and foot neuropathy)          Balance:  Sitting: Intact  Standing: Impaired; With support (RW)  Standing - Static: Fair  Standing - Dynamic : Fair    Functional Mobility and Transfers for ADLs:  Bed Mobility:  Supine to Sit: Minimum assistance    Transfers:  Sit to Stand: Minimum assistance;Assist x2  Stand to Sit: Minimum assistance  Toilet Transfer : Minimum assistance;Assist x2    ADL Assessment:  Feeding: Setup    Oral Facial Hygiene/Grooming: Setup (sitting)    Bathing: Moderate assistance    Upper Body Dressing: Minimum assistance    Lower Body Dressing: Moderate assistance    Toileting: Moderate assistance       ADL Intervention and task modifications:    Pt required min A x2 to stand and throughout mobility for safety. Pt often shaking RW in static stand and required verbal cues to keep hands on hand , reported this was due to his neuropathy. Verbal/tactile cues given for safe use of RW during turns and for sit<>stand, pt with poor motor planning and forgetting to turn RW or not placing hands on handles/using DME safely. Returned to sitting EOB to perform LB dressing ADL with mod A due to O2 sats reading 85% on during task, although quickly increased to 95% with rest. 4 L O2 reapplied at end of session. In bed with alarm set and call bell in reach at end of session, reminded to call for assistance prior to transferring. Grooming  Washing Hands: Contact guard assistance (standing)  Cues: Verbal cues provided      Lower Body Dressing Assistance  Socks:  Moderate assistance         Cognitive Retraining  Safety/Judgement: Awareness of environment;Decreased awareness of need for safety;Decreased insight into deficits; Fall prevention      Functional Measure:  Barthel Index:    Bathin  Bladder: 5  Bowels: 10  Groomin  Dressin  Feeding: 10  Mobility: 0  Stairs: 0  Toilet Use: 5  Transfer (Bed to Chair and Back): 5  Total: 45       Barthel and G-code impairment scale:  Percentage of impairment CH  0% CI  1-19% CJ  20-39% CK  40-59% CL  60-79% CM  80-99% CN  100%   Barthel Score 0-100 100 99-80 79-60 59-40 20-39 1-19   0   Barthel Score 0-20 20 17-19 13-16 9-12 5-8 1-4 0      The Barthel ADL Index: Guidelines  1. The index should be used as a record of what a patient does, not as a record of what a patient could do. 2. The main aim is to establish degree of independence from any help, physical or verbal, however minor and for whatever reason. 3. The need for supervision renders the patient not independent. 4. A patient's performance should be established using the best available evidence. Asking the patient, friends/relatives and nurses are the usual sources, but direct observation and common sense are also important. However direct testing is not needed. 5. Usually the patient's performance over the preceding 24-48 hours is important, but occasionally longer periods will be relevant. 6. Middle categories imply that the patient supplies over 50 per cent of the effort. 7. Use of aids to be independent is allowed. Eduardo Perez., Barthel, D.W. (1334). Functional evaluation: the Barthel Index. [facility] (14)2. ANDREW Santiago, Cruz Hughes., Rohan Figueroa., Angelica [place], [address] [place] [address] (). Measuring the change indisability after inpatient rehabilitation; comparison of the responsiveness of the Barthel Index and Functional [place] Measure. Journal of Neurology, Neurosurgery, and Psychiatry, 66(4), 256-225.   KATE Farias, DONNA Nichole, & Lita Crum M.A. (2004.) Assessment of post-stroke quality of life in cost-effectiveness studies: The usefulness of the Barthel Index and the EuroQoL-5D. Quality of Life Research, 13, 775-00       G codes: In compliance with CMSs Claims Based Outcome Reporting, the following G-code set was chosen for this patient based on their primary functional limitation being treated: The outcome measure chosen to determine the severity of the functional limitation was the Barthel Index with a score of 45/100 which was correlated with the impairment scale. ? Self Care:     - CURRENT STATUS: CK - 40%-59% impaired, limited or restricted    - GOAL STATUS: CI - 1%-19% impaired, limited or restricted    - D/C STATUS:  ---------------To be determined---------------     Occupational Therapy Evaluation Charge Determination   History Examination Decision-Making   LOW Complexity : Brief history review  MEDIUM Complexity : 3-5 performance deficits relating to physical, cognitive , or psychosocial skils that result in activity limitations and / or participation restrictions MEDIUM Complexity : Patient may present with comorbidities that affect occupational performnce. Miniml to moderate modification of tasks or assistance (eg, physical or verbal ) with assesment(s) is necessary to enable patient to complete evaluation       Based on the above components, the patient evaluation is determined to be of the following complexity level: LOW   Pain:  Pain Scale 1: Numeric (0 - 10)  Pain Intensity 1: 0              Activity Tolerance:   Good  Please refer to the flowsheet for vital signs taken during this treatment. After treatment:   [] Patient left in no apparent distress sitting up in chair  [x] Patient left in no apparent distress in bed  [x] Call bell left within reach  [x] Nursing notified  [] Caregiver present  [] Bed alarm activated    COMMUNICATION/EDUCATION:   The patients plan of care was discussed with: Physical Therapist and Registered Nurse.   [x] Home safety education was provided and the patient/caregiver indicated understanding. [x] Patient/family have participated as able in goal setting and plan of care. [x] Patient/family agree to work toward stated goals and plan of care. [] Patient understands intent and goals of therapy, but is neutral about his/her participation. [] Patient is unable to participate in goal setting and plan of care. This patients plan of care is appropriate for delegation to [facility].     Thank you for this referral.  Niko Sood OT  Time Calculation: 23 mins

## 2018-02-08 NOTE — PROGRESS NOTES
I went to the patient room to take the patient off his  breathing treatment The patient started feel SOB sp 02 was 86 on room air the patient is also wheezing. I did inform the nurse about the situation, I also informed the nurse to call the doctor to get PRN breathing treatments.  I placed the patient on oxygen 4l/m and gave the patient a duoneb breathing treatment

## 2018-02-08 NOTE — PROGRESS NOTES
Pt has audible wheezing - reports he does not feel well - unable to lay down - charge nurse notified - provider notified - new orders for stat chest xray and lasix  VSS taken again - pt on oxygen - assisted with voiding - voided 30cc  Resting - pulse ox 93-94 on 4l nc - will cont to monitor.

## 2018-02-08 NOTE — PROGRESS NOTES
Pt cont to be conf - when asked if he needs toileting or why he is getting up he states I dont know  Assisted from bed to chair and back to bed - very unsteady and weak  Has had 300-400 cc urine OP since lasix, completed breathing treatmetn - ABG and cxr completed as well

## 2018-02-08 NOTE — PROGRESS NOTES
Bedside shift change report given to Iron (oncoming nurse) by Anabell Ayala (offgoing nurse). Report included the following information SBAR, Kardex, Procedure Summary, Intake/Output and Recent Results.

## 2018-02-08 NOTE — PROGRESS NOTES
Bedside shift change report given to alvarado  (oncoming nurse) by Mary Berger (offgoing nurse). Report included the following information SBAR, Kardex and Recent Results. Found pt in room siting in chair - confused - he states he does not feel very well. RT just completed providing him with a breathing treatment - found him to be 86 percent pulse ox and placed him on 4l of oxygen. Now 93-94 percent oxygen placed back on tele. Needs Prn order for albuterol inhaler.

## 2018-02-09 NOTE — PROGRESS NOTES
Stella Thomason MD   Phone/text: [phone] (7am to 7pm)  After 7pm please call  for hospitalist on call    Hospitalist Progress Note     2/9/2018   PCP:  Dr. Mariam Shin MD  Chief Complaint   Patient presents with    Fever    Wheezing       Admission Summary:   Patient is a 80-year-old gentleman with past medical history of atrial fibrillation, history of NPH with  shunt, history of abnormal EKG, hypertension, peripheral neuropathy, borderline diabetes, dyslipidemia, Meniere disease,  sensorineural hearing loss, orthostatic hypotension, history of CVA, hypertensive cardiovascular disease, CKD, mitral regurgitation and recurrent falls, who presents to the hospital with fever. Reason For Visit:  Influenza    Assessment/Plan   Influenza A (POA) - getting a bit better  - CT chest 2/5 with trace bilateral fluid, no infiltrate but limited by respiratory artifact  - Continue oseltamivir started 2/5  - Stopped cefepime    Acute diastolic heart failure - due to IV fluids, worsened 2/8 in the AM, better  - Echo 2/6 with LVEF 55-60%, no WMA, wall thickness mildly increased, mild MR  - BNP elevated but not much given age  - Stopped IV fluids  - PO lasix for a few more days    Paroxysmal Afib with RVR - appears to have aberrancy, likely being drive by the flu. Now starting to get a bit bradycardic  - Continue telemetry  - Changed duonebs to ipratroprium alone  - Reduce diltiazem back to home dose, stop amlodipine (2 ccb).  Continue digoxin  - Continue aspirin, not on OAC due to frequent falls    Metabolic encephalopathy - likely due to flu and hypoxia, confusion improving  - Re-orient as able    CKD3 - renal function stable, monitor with diuresis    Troponin elevation - likely due to strain from flu, afib on top of CKD  - Continue aspirin  - Rate control as above  - Cardiology consulted    Thrombocytopenia - unclear cause, no acute bleeding, monitor    DM2 (chronic) - A1c 6.3, which is a bit low for a 80year old. BG acceptable  - Stop glipizide and linagliptin  - SSI as needed    Hypothyroid (chronic) - continue home synthroid    See orders for other plans. VTE prophylaxis: enoxaparin  Discussed plan of care with Patient/Family, Nurse and    Pre-admission lived at home  Discharge plan: SNF  Estimated time to discharge: unclear     Subjective   Mr. Migdalia Kaur is feeling better. No SOB with oxygen on. He is still a bit confused but wondering how much longer it is going to take before his flu is resolved. Reviewed interval history  Physical examination     Visit Vitals    /51 (BP 1 Location: Right arm)    Pulse (!) 53    Temp 97.6 °F (36.4 °C)    Resp 16    Ht 6' (1.829 m)    Wt 77.8 kg (171 lb 8.3 oz)    SpO2 95%    BMI 23.26 kg/m2       Temp (24hrs), Av.8 °F (36.6 °C), Min:97 °F (36.1 °C), Max:98.5 °F (36.9 °C)      Intake/Output Summary (Last 24 hours) at 18 0812  Last data filed at 18 0438   Gross per 24 hour   Intake                0 ml   Output             1075 ml   Net            -1075 ml       Gen - NAD  HEENT - MMM  Neck - supple, full ROM  CV - regular rate, irregular rhythm, no MRG  Resp - lungs with scattered wheezing, no rales/rhonchi, normal WOB  GI - abdomen S, NT, ND, +BS. No hepatosplenomegaly   - no CVA tenderness, bladder non-palpable in lower abdomen  MSK - normal tone and bulk, no edema  Neuro - A&O, no focal deficits  Psych - calm and cooperative with normal affect    Data Review       Telemetry    ECG    Xray x   CT scan    MRI    Echocardiogram    Ultrasound              I have reviewed the flow sheet and recent notes  New labs and data personally reviewed.       Denise Mahan MD  Internal Medicine  2018

## 2018-02-09 NOTE — ROUTINE PROCESS
Bedside shift change report given to Angela (oncoming nurse) by Hector Sanchez (offgoing nurse). Report included the following information SBAR, Kardex, ED Summary, Procedure Summary, Intake/Output and MAR.

## 2018-02-09 NOTE — ADT AUTH CERT NOTES
2/8 Progress Note by Petty Wade RN        Review Status Review Entered       In Primary 2/8/2018       Details         2/8/2018   PCP:  Dr. Michelle Cardoso MD        Chief Complaint   Patient presents with    Fever    Wheezing         Admission Summary:   Patient is a 17-year-old gentleman with past medical history of atrial fibrillation, history of NPH with  shunt, history of abnormal EKG, hypertension, peripheral neuropathy, borderline diabetes, dyslipidemia, Meniere disease,  sensorineural hearing loss, orthostatic hypotension, history of CVA, hypertensive cardiovascular disease, CKD, mitral regurgitation and recurrent falls, who presents to the hospital with fever.     Reason For Visit:  Influenza     Assessment/Plan   Influenza A (POA) - seems to be improving but I am concerned that the new infiltrates are not pulmonary edmea but worsening viral pneumonia  - CT chest 2/5 with trace bilateral fluid, no infiltrate but limited by respiratory artifact  - Continue oseltamivir started 2/5  - Stopped cefepime     Acute diastolic heart failure - due to IV fluids, worsened 2/8 in the AM  - Echo 2/6 with LVEF 55-60%, no WMA, wall thickness mildly increased, mild MR  - Check BNP  - Stop IV fluids  - IV lasix     Paroxysmal Afib with RVR - appears to have aberrancy, likely being drive by the flu. Rate improved and complex narrow now  - Continue telemetry  - Changed duonebs to ipratroprium alone  - Increased diltiazem, stop amlodipine (2 ccb).  Continue digoxin  - Continue aspirin, not on OAC due to frequent falls     Metabolic encephalopathy - likely due to flu and hypoxia  - Re-orient as able     CKD3 - renal function stable, monitor with diuresis     Troponin elevation - likely due to strain from flu, afib on top of CKD  - Continue aspirin  - Rate control as above  - Cardiology consulted     Thrombocytopenia - unclear cause, no acute bleeding, monitor     DM2 (chronic) - A1c 6.3, which is a bit low for a 80year old  - Stop glipizide and linagliptin  - SSI as needed     Hypothyroid (chronic) - continue home synthroid     See orders for other plans. VTE prophylaxis: enoxaparin  Discussed plan of care with Patient/Family, Nurse and    Pre-admission lived at home  Discharge plan: home  Estimated time to discharge: unclear      Subjective   Mr. Celso Fam is feeling worse. He became acutely confused and hypoxic overnight. Today he is sitting up and says that he is feeling fine. He denies SOB even though he is actively wheezing.  Denies chest pain, palpitations.                   Systemic or Infectious Condition GRG - Care Day 3 (2/7/2018) by Deepthi Pulido RN        Review Status Review Entered       Completed 2/8/2018       Details              Care Day: 3 Care Date: 2/7/2018 Level of Care: Telemetry       Guideline Day 1        Clinical Status       ( ) * Clinical Indications met [H]              Interventions       (X) Inpatient interventions as needed                                   * Milestone              Additional Notes       Hospitalist Progress Note               2/7/2018        PCP:  Dr. Vance Ambriz MD              Chief Complaint       Patient presents with       o Fever       o Wheezing                       Admission Summary:        Patient is a 66-year-old gentleman with past medical history of atrial fibrillation, history of NPH with  shunt, history of abnormal EKG, hypertension, peripheral neuropathy, borderline diabetes, dyslipidemia, Meniere disease,  sensorineural hearing loss, orthostatic hypotension, history of CVA, hypertensive cardiovascular disease, CKD, mitral regurgitation and recurrent falls, who presents to the hospital with fever.               Reason For Visit:       Influenza               Assessment/Plan       Influenza A (POA) - improving       - CT chest 2/5 with trace bilateral fluid, no infiltrate but limited by respiratory artifact       - Continue oseltamivir started 2/5       - Stop cefepime               Wheezing (POA) - due to flu, improved but still a bit wheezy       - Changed duonebs to ipratroprium alone       - Monitor               Paroxysmal Afib with RVR - appears to have aberrancy, likely being drive by the flu. Rate improved and complex narrow now       - Continue telemetry       - Changed duonebs to ipratroprium alone       - Increased diltiazem, stop amlodipine (2 ccb). Continue digoxin       - Continue aspirin, not on OAC due to frequent falls               CKD3 - renal function stable, monitor               Troponin elevation - likely due to strain from flu, afib on top of CKD       - Echo 2/6 with LVEF 55-60%, no WMA, wall thickness mildly increased, mild MR       - Continue aspirin       - Rate control as above       - Cardiology consulted               Thrombocytopenia - unclear cause, no acute bleeding, monitor               DM2 (chronic) - A1c 6.3, which is a bit low for a 80year old       - Restart linagliptin, stop glipizide       - SSI as needed               Hypothyroid (chronic) - continue home synthroid               See orders for other plans.       VTE prophylaxis: enoxaparin       Discussed plan of care with Patient/Family, Nurse and         Pre-admission lived at home       Discharge plan: home       Estimated time to discharge: 1-2 days              RT NOTE: I went to the patient room to take the patient off his breathing treatment The patient started feel SOB sp 02 was 86 on room air the patient is also wheezing. I did inform the nurse about the situation, I also informed the nurse to call the doctor to get PRN breathing treatments.  I placed the patient on oxygen 4l/m and gave the patient a duoneb breathing treatment              LABS: WBC 4.3, HGB 13.1, HCT 40.4, , , K 3.9, CO2 25, BUN 25, Cr 1.37,        MEDS:DuoNeb q2h PRN x2, ASA 81mg PO, Digoxin 0.125mg PO, Cardizem 90mg PO., Lovneox 40mg SC, PEPCID 40mg PO, ATROVENT NEB x 5, Maxipime 2g IV       VITALS: T98.6, P75, RR 18, /74, 91%RA       OTHER OTHERS: Cardiac Diet, SCDs, Ambulate with assist, aspiration precautions              Cardiology Progress Note                  Subjective:       Denies CP, SOB or palpitations.  Feeling much better today                Assessment and Plan               1. Afib, chronic - rate controlled mid 70s-80s                             - RVR driven by Flu                             - Continue Dig, Dilt and Lovenox       2. Influenza A                             - per Primary team       3. CKD                             - stage III - follow labs       4. DM II                             - Home meds       5.  Hypothyroidism                             - home synthroid               Improved from symptoms standpoint related to Criss Cords now controlled on home med regimen.  Continue current cardiac therapies and advanced back to all home meds as able.  Needs no further cardiac testing at this time.  Will see again as needed.              Assessment/Plan/Discussion:Cardiology Attending:                Patient seen earlier today and examined  and agree with Advance Practice Provider (KUSH, NP,PA)  assessment and plans.       Edvin Arciniega is a 80 y.o. male        Limber and alert , mindfulness       No complaints or CP       Less SOB       Remains in AFib chronic with good rate control       Continue meds for rate       Will defer to good care of Hospitalist/Internal Medicine team and see back PRN

## 2018-02-09 NOTE — PROGRESS NOTES
Bedside and Verbal shift change report given to William Lawler RN (oncoming nurse) by Tere Ortiz (offgoing nurse). Report included the following information SBAR, Kardex, Intake/Output, MAR and Recent Results.

## 2018-02-09 NOTE — PROGRESS NOTES
Problem: Mobility Impaired (Adult and Pediatric)  Goal: *Acute Goals and Plan of Care (Insert Text)  Physical Therapy Goals  Initiated 2/8/2018  1. Patient will move from supine to sit and sit to supine  in bed with supervision/set-up within 7 day(s). 2.  Patient will transfer from bed to chair and chair to bed with supervision/set-up using the least restrictive device within 7 day(s). 3.  Patient will perform sit to stand with supervision/set-up within 7 day(s). 4.  Patient will ambulate with supervision/set-up for 150 feet with the least restrictive device within 7 day(s). physical Therapy TREATMENT  Patient: Mariya Abbasi (34 y.o. male)  Date: 2/9/2018  Diagnosis: Fever  Influenza A Fever       Precautions: Fall, DNR  Chart, physical therapy assessment, plan of care and goals were reviewed. ASSESSMENT:  Patient's continues to be unsafe with ambulation. He comes to sitting EOB with CGA. Stood with a RW and ambulated 30 feet. His gait fluctuated between being steady and then ataxic and haphazard so he isn't safe to be up alone without assist.  D[employer] to be up in the chair with the alarm using the gait belt and RW. Returned to the bedside chair after walking and was able to  place and march a few steps for 1 minute before he fatigued and needed to sit and rest.  Needs [facility] SNF rehab before returning to his ILR apt. Progression toward goals:  []      Improving appropriately and progressing toward goals  [x]      Improving slowly and progressing toward goals  []      Not making progress toward goals and plan of care will be adjusted     PLAN:  Patient continues to benefit from skilled intervention to address the above impairments. Continue treatment per established plan of care.   Discharge Recommendations:  Shakir Asher  Further Equipment Recommendations for Discharge:  none     SUBJECTIVE:   Patient stated I .    OBJECTIVE DATA SUMMARY:   Critical Behavior:  Neurologic State: Alert  Orientation Level: Oriented X4, Other (Comment) (periodic confusion)  Cognition: Impulsive, Impaired decision making, Follows commands, Poor safety awareness  Safety/Judgement: Awareness of environment, Decreased awareness of need for safety, Decreased insight into deficits, Fall prevention  Functional Mobility Training:  Bed Mobility:  Rolling: Minimum assistance;Assist x1;Additional time  Supine to Sit: Minimum assistance;Assist x1;Additional time     Scooting: Contact guard assistance; Additional time;Assist x1         Transfers:  Sit to Stand: Assist x1;Minimum assistance; Additional time  Stand to Sit: Minimum assistance; Additional time;Assist x1                             Balance:  Sitting: Intact  Standing: Impaired  Standing - Static: Constant support; Fair  Standing - Dynamic : Fair  Ambulation/Gait Training:  Distance (ft): 30 Feet (ft)  Assistive Device: Walker, rolling;Gait belt  Ambulation - Level of Assistance: Minimal assistance; Additional time;Assist x1        Gait Abnormalities: Decreased step clearance; Ataxic;Path deviations        Base of Support: Narrowed     Speed/Christina: Fluctuations  Step Length: Left shortened;Right shortened                               Pain:  Pain Scale 1: Numeric (0 - 10)  Pain Intensity 1: 0              Activity Tolerance:   Good. Fatigues fairly quickly  Please refer to the flowsheet for vital signs taken during this treatment.   After treatment:   [x] Patient left in no apparent distress sitting up in chair  [] Patient left in no apparent distress in bed  [x] Call bell left within reach  [x] Nursing notified  [] Caregiver present  [x] Bed alarm activated    COMMUNICATION/COLLABORATION:   The patients plan of care was discussed with: Registered Nurse and Certified Nursing Assistant/Patient Care Technician    Fabio Fritz PT   Time Calculation: 20 mins

## 2018-02-10 NOTE — PROGRESS NOTES
Greg Marcus MD   Phone/text: [phone] (7am to 7pm)  After 7pm please call  for hospitalist on call    Hospitalist Progress Note     2/10/2018   PCP:  Dr. Reyes Regulus, MD  Chief Complaint   Patient presents with    Fever    Wheezing       Admission Summary:   Patient is a 66-year-old gentleman with past medical history of atrial fibrillation, history of NPH with  shunt, history of abnormal EKG, hypertension, peripheral neuropathy, borderline diabetes, dyslipidemia, Meniere disease,  sensorineural hearing loss, orthostatic hypotension, history of CVA, hypertensive cardiovascular disease, CKD, mitral regurgitation and recurrent falls, who presents to the hospital with fever. Reason For Visit:  Influenza    Assessment/Plan   Influenza A (POA) - getting a bit better  - CT chest 2/5 with trace bilateral fluid, no infiltrate but limited by respiratory artifact  - Continue oseltamivir started 2/5, finishing today  - Stopped cefepime    Acute diastolic heart failure - due to IV fluids, worsened 2/8 in the AM, continues to improve  - Echo 2/6 with LVEF 55-60%, no WMA, wall thickness mildly increased, mild MR  - BNP elevated but not much given age  - Stopped IV fluids  - PO lasix for 1 more day    Paroxysmal Afib with RVR - appears to have aberrancy when he goes very fast, likely being drive by the flu. Rate good now  - Continue telemetry  - Changed duonebs to ipratroprium alone  - Reduce diltiazem back to home dose, stop amlodipine (2 ccb).  Continue digoxin  - Continue aspirin, not on OAC due to frequent falls    Metabolic encephalopathy - likely due to flu and hypoxia, back to baseline per family  - Re-orient as able    CKD3 - renal function stable, monitor with diuresis    Troponin elevation - likely due to strain from flu, afib on top of CKD  - Continue aspirin  - Rate control as above  - Cardiology consulted    Thrombocytopenia - unclear cause, no acute bleeding, monitor    DM2 (chronic) - A1c 6.3, which is a bit low for a 80year old. BG acceptable  - Stop glipizide and linagliptin  - SSI as needed    Hypothyroid (chronic) - continue home synthroid    See orders for other plans. VTE prophylaxis: enoxaparin  Discussed plan of care with Patient/Family, Nurse and    Pre-admission lived at home  Discharge plan: [facility] healthcare  Estimated time to discharge: tomorrow     Subjective   Mr. Temitope Flores is feeling back to his normal self. No SOB, confusion, palpitations. Reviewed interval history  Physical examination     Visit Vitals    /76 (BP 1 Location: Right arm, BP Patient Position: At rest)    Pulse 60    Temp 97.8 °F (36.6 °C)    Resp 16    Ht 6' (1.829 m)    Wt 72 kg (158 lb 11.7 oz)    SpO2 97%    BMI 21.53 kg/m2       Temp (24hrs), Av.8 °F (36.6 °C), Min:97.4 °F (36.3 °C), Max:98.4 °F (36.9 °C)      Intake/Output Summary (Last 24 hours) at 02/10/18 1730  Last data filed at 02/10/18 1224   Gross per 24 hour   Intake              620 ml   Output             1325 ml   Net             -705 ml       Gen - NAD  HEENT - MMM  Neck - supple, full ROM  CV - regular rate, irregular rhythm, no MRG  Resp - lungs cta b/l, normal WOB  GI - abdomen S, NT, ND, +BS. No hepatosplenomegaly   - no CVA tenderness, bladder non-palpable in lower abdomen  MSK - normal tone and bulk, no edema  Neuro - A&O, no focal deficits  Psych - calm and cooperative with normal affect    Data Review       Telemetry    ECG    Xray    CT scan    MRI    Echocardiogram    Ultrasound              I have reviewed the flow sheet and recent notes  New labs and data personally reviewed.       Cas Linda MD  Internal Medicine  2/10/2018

## 2018-02-10 NOTE — PROGRESS NOTES
Dr. Felisa Carrasco (Hospitalist on call) notified and aware that [facility] had called [place] at approximately 21:55 to let staff know that the patient had an episode of V-tach for 8 beats in approximately in 7 seconds. The V-tach episode happened at approximately 21:33 which was about the same time that the sitter had assisted the patient to ambulate to the bathroom. Shortly after that episode, patient's rhythm went back to A-Fib with the heart rate in the 60's. Patient usually runs A-fib with a heart rate between 50's and 70's. No new orders given. RN will continue patient's plan of care.

## 2018-02-10 NOTE — PROGRESS NOTES
Bedside shift change report given to [facility] KLAUDIA (oncoming nurse) by Nelda Cox (offgoing nurse). Report included the following information SBAR.

## 2018-02-10 NOTE — PROGRESS NOTES
ADULT PROTOCOL: JET AEROSOL ASSESSMENT    Patient  Devyn Montano     80 y.o.   male     2/10/2018  1:21 AM    Breath Sounds Pre Procedure: Right Breath Sounds: Diminished                               Left Breath Sounds: Diminished    Breath Sounds Post Procedure: Right Breath Sounds: Diminished                                 Left Breath Sounds: Diminished    Breathing pattern: Pre procedure Breathing Pattern: Regular          Post procedure Breathing Pattern: Regular    Heart Rate: Pre procedure Pulse: 53           Post procedure Pulse: 76    Resp Rate: Pre procedure Respirations: 16           Post procedure Respirations: 16    Cough: Pre procedure Cough: Non-productive               Post procedure      Suctioned: NO    Oxygen: O2 Device: Nasal cannula   Flow rate (L/min) 2     Changed: YES    SpO2: Pre procedure SpO2: 98 %   with oxygen              Post procedure SpO2: 95 %  with oxygen    Nebulizer Therapy: Current medications Aerosolized Medications: Ipratropium bromide      Changed: NO    Smoking History: no information in H&P notes    Problem List:   Patient Active Problem List   Diagnosis Code    Orthostatic hypotension I95.1    Dizziness  R42    Atrial fibrillation (HCC) I48.91    Stroke Cerebral Embolism With Cerebral Infract I63.40    HCVD (Hypertensive Cardiovasc Dis) Without Heart Failure I11.9    Peripheral neuropathy G62.9    CKD (chronic kidney disease) N18.9    Dyslipidemia E78.5    Neurological disorder G98.8    Mitral regurgitation I34.0    Right knee DJD M17.11    Pneumonia J18.9    NPH (normal pressure hydrocephalus) G91.2    Recurrent falls R29.6    Fever R50.9    Influenza A J10.1       Respiratory Therapist: Sabine Murray

## 2018-02-11 NOTE — DISCHARGE INSTRUCTIONS
Discharging provider: Avery Johnson MD    Primary care provider: Marisabel Frias MD    [address]:    Patient is a 42-year-old gentleman with past medical history of atrial fibrillation, history of NPH with  shunt, history of abnormal EKG, hypertension, peripheral neuropathy, borderline diabetes, dyslipidemia, Meniere disease,  sensorineural hearing loss, orthostatic hypotension, history of CVA, hypertensive cardiovascular disease, CKD, mitral regurgitation and recurrent falls, who presents to the hospital with fever. Influenza A (POA) - much improved  - CT chest 2/5 with trace bilateral fluid, no infiltrate but limited by respiratory artifact  - Received oseltamivir 2/5-2/10  - Stopped cefepime     Acute diastolic heart failure - due to IV fluids, worsened 2/8 in the AM, continues to improve  - Echo 2/6 with LVEF 55-60%, no WMA, wall thickness mildly increased, mild MR  - BNP elevated but not much given age  - Stopped IV fluids  - Received lasix     Paroxysmal Afib with RVR - appears to have aberrancy when he goes very fast, likely being drive by the flu. Rate controlled now  - Continue telemetry  - Changed duonebs to ipratroprium alone  - Reduce diltiazem back to home dose, stop amlodipine (2 ccb). Continue digoxin  - Continue aspirin, not on OAC due to frequent falls     Metabolic encephalopathy - likely due to flu and hypoxia, back to baseline per family  - Re-orient as able     CKD3 - renal function stable, monitor with diuresis     Troponin elevation - likely due to strain from flu, afib on top of CKD  - Continue aspirin  - Rate control as above  - Cardiology consulted     Thrombocytopenia - unclear cause, no acute bleeding, monitor     DM2 (chronic) - A1c 6.3, which is a bit low for a 80year old.  BG acceptable even without his oral hypoglycemics  - Stop glipizide and linagliptin  - Continue diabetic diet  - SSI as needed     Hypothyroid (chronic) - continue home synthroid    FOLLOW-UP CARE RECOMMENDATIONS:    APPOINTMENTS:  Follow-up Information     Follow up With Details Comments Contact Info    Ilia Jaquez MD Schedule an appointment as soon as possible for a visit in 1 week For follow up after hospitalization [address]  [phone]           It is very important that you keep follow-up appointment(s). Bring discharge papers, medication list (and/or medication bottles) to follow-up appointments for review by outpatient provider(s). ONGOING TREATMENT PLAN: PT/OT    Specific symptoms to watch for: chest pain, shortness of breath, fever, chills, nausea, vomiting, diarrhea, change in mentation, falling, weakness, bleeding. DIET:  Diabetic Diet    ACTIVITY:  Activity as tolerated and PT/OT Eval and Treat    GOALS OF CARE:  x  Eventual return to home/independent/assisted living     Long term SNF      Hospice     No rehospitalization     Patient condition at discharge:   Functional status    Poor    x  Deconditioned      Independent   Cognition    Lucid   x  Forgetful (some sensescence)     Dementia   Catheters/lines (plus indication)    Schmidt     PICC      PEG         Code status    Full code    x  DNR    . . . . . . . . . . . . . . . . . . . . . . . . . . . . . . . . . . . . . . . . . . . . . . . . . . . . . . . . . . . . . . . . . . . . . . . [facility]      CHRONIC MEDICAL CONDITIONS:  Problem List as of 2/11/2018  Date Reviewed: 2/5/2018          Codes Class Noted - Resolved    Influenza A ICD-10-CM: J10.1  ICD-9-CM: 487.1  2/6/2018 - Present        * (Principal)Fever ICD-10-CM: R50.9  ICD-9-CM: 780.60  2/5/2018 - Present        NPH (normal pressure hydrocephalus) ICD-10-CM: G91.2  ICD-9-CM: 331.5  11/30/2017 - Present        Recurrent falls ICD-10-CM: R29.6  ICD-9-CM: V15.88  11/30/2017 - Present        Pneumonia ICD-10-CM: J18.9  ICD-9-CM: 040  11/29/2017 - Present        Right knee DJD ICD-10-CM: M17.11  ICD-9-CM: 715.96  3/4/2011 - Present Overview Signed 3/4/2011 12:29 AM by Rick Vanegas PA-C     Scheduled for RIGHT TKR on 03/04/11               Peripheral neuropathy ICD-10-CM: G62.9  ICD-9-CM: 356.9  Unknown - Present        CKD (chronic kidney disease) ICD-10-CM: N18.9  ICD-9-CM: 585. 9  Unknown - Present        Dyslipidemia ICD-10-CM: E78.5  ICD-9-CM: 272.4  Unknown - Present        Neurological disorder ICD-10-CM: G98.8  ICD-9-CM: 349.9  Unknown - Present    Overview Signed 1/24/2011 11:00 AM by Asha Triana MD     dizziness, tilt table test 5-4-07 unremarkable             Mitral regurgitation ICD-10-CM: I34.0  ICD-9-CM: 424.0  Unknown - Present    Overview Signed 1/24/2011 11:00 AM by Asha Triana MD     echo 3-27-07             Orthostatic hypotension ICD-10-CM: I95.1  ICD-9-CM: 458.0  1/17/2011 - Present        Dizziness  ICD-10-CM: R42  ICD-9-CM: 780.4  1/17/2011 - Present        Atrial fibrillation (HCC) ICD-10-CM: I48.91  ICD-9-CM: 427.31  Unknown - Present    Overview Signed 1/24/2011 11:00 AM by Asha Triana MD     CHRONIC             Stroke Cerebral Embolism With Cerebral Infract ICD-10-CM: I63.40  ICD-9-CM: 434.11  1/17/2011 - Present        HCVD (Hypertensive Cardiovasc Dis) Without Heart Failure ICD-10-CM: I11.9  ICD-9-CM: 402.90  1/17/2011 - Present        RESOLVED: Acute confusion ICD-10-CM: R41.0  ICD-9-CM: 293.0  9/29/2012 - 9/30/2012        RESOLVED: Presyncope ICD-10-CM: R55  ICD-9-CM: 780.2  1/17/2011 - 11/30/2017

## 2018-02-11 NOTE — DISCHARGE SUMMARY
Discharge Summary     Patient:  Ana Martinez       MRN: [id number]       YOB: 1923       Age: 80 y.o. Date of admission:  2/5/2018    Date of discharge:  2/11/2018    Primary care provider: Dr. Papi Herrera MD    Admitting provider:  Jeanna Hernandez MD    Discharging provider:  Liliana Padilla 91.: [phone]. If unavailable, call [phone] and ask the  to page the triage hospitalist.    Consultations  · IP CONSULT TO HOSPITALIST  · IP CONSULT TO CARDIOLOGY    Procedures  · * No surgery found *    Discharge destination: [place] [address]. The patient is stable for discharge. Admission diagnosis  · Fever  · Influenza A    Current Discharge Medication List      START taking these medications    Details   ipratropium (ATROVENT) 0.02 % soln 2.5 mL by Nebulization route every six (6) hours as needed. For wheezing. Qty: 30 Vial, Refills: 0         CONTINUE these medications which have NOT CHANGED    Details   cranberry-vitamin c-vitamin e (CRANBERRY PLUS VITAMIN C) 140-100 mg cap Take 2 Caps by mouth daily. dilTIAZem CD (CARDIZEM CD) 120 mg ER capsule Take 1 Cap by mouth daily. Qty: 30 Cap      Lactobacillus Acidoph & Bulgar (FLORANEX) 1 million cell tab tablet Take 2 Tabs by mouth two (2) times a day. aspirin delayed-release 81 mg tablet Take 1 Tab by mouth daily. !! digoxin (LANOXIN) 0.125 mg tablet Take 0.125 mg by mouth daily. Daily in the AM and in the evenings on Mon, Wed, Thurs, Sat (see additional order)      DULoxetine (CYMBALTA) 30 mg capsule Take 30 mg by mouth nightly. levothyroxine (SYNTHROID) 100 mcg tablet Take 100 mcg by mouth Daily (before breakfast). fish oil-omega-3 fatty acids 340-1,000 mg capsule Take 1 Cap by mouth two (2) times a day.  Lunch and Bedtime      raNITIdine (ZANTAC) 300 mg tablet Take 300 mg by mouth nightly. simethicone (MYLICON) 80 mg chewable tablet Take 80 mg by mouth three (3) times daily. Indications: FLATULENCE      traZODone (DESYREL) 100 mg tablet Take 200 mg by mouth nightly. cholecalciferol, VITAMIN D3, (VITAMIN D3) 5,000 unit tab tablet Take 5,000 Units by mouth daily (with lunch). clindamycin (CLEOCIN) 300 mg capsule Take 600 mg by mouth once as needed (Prior to dental procedures). pregabalin (LYRICA) 200 mg capsule Take 400 mg by mouth nightly. pramipexole (MIRAPEX) 0.125 mg tablet Take 0.125 mg by mouth nightly. finasteride (PROSCAR) 5 mg tablet Take 5 mg by mouth daily. simvastatin (ZOCOR) 20 mg tablet Take 20 mg by mouth nightly. !! digoxin (LANOXIN) 0.125 mg tablet Take 0.125 mg by mouth four (4) days a week. Daily in the AM and in the evenings on Mon, Wed, Thurs, Sat (see additional order)      tamsulosin (FLOMAX) 0.4 mg capsule Take 0.4 mg by mouth nightly. omeprazole (PRILOSEC) 20 mg capsule Take 20 mg by mouth Daily (before breakfast). !! - Potential duplicate medications found. Please discuss with provider. STOP taking these medications       glipiZIDE SR (GLUCOTROL XL) 2.5 mg CR tablet Comments:   Reason for Stopping:         linagliptin (TRADJENTA) 5 mg tablet Comments:   Reason for Stopping:         amLODIPine (NORVASC) 5 mg tablet Comments:   Reason for Stopping: Follow-up Information     Follow up With Details Comments [address], [place] Schedule an appointment as soon as possible for a visit in 1 week For follow up after hospitalization [place] [address]  [phone]            Final discharge diagnoses and brief hospital course  Please also refer to the admission H&P for details on the presenting problem.     Patient is a 61-year-old gentleman with past medical history of atrial fibrillation, history of NPH with  shunt, history of abnormal EKG, hypertension, peripheral neuropathy, borderline diabetes, dyslipidemia, Meniere disease,  sensorineural hearing loss, orthostatic hypotension, history of CVA, hypertensive cardiovascular disease, CKD, mitral regurgitation and recurrent falls, who presents to the hospital with fever. Influenza A (POA) - much improved  - CT chest 2/5 with trace bilateral fluid, no infiltrate but limited by respiratory artifact  - Received oseltamivir 2/5-2/10  - Stopped cefepime     Acute diastolic heart failure - due to IV fluids, worsened 2/8 in the AM, continues to improve  - Echo 2/6 with LVEF 55-60%, no WMA, wall thickness mildly increased, mild MR  - BNP elevated but not much given age  - Stopped IV fluids  - Received lasix     Paroxysmal Afib with RVR - appears to have aberrancy when he goes very fast, likely being drive by the flu. Rate controlled now  - Continue telemetry  - Changed duonebs to ipratroprium alone  - Reduce diltiazem back to home dose, stop amlodipine (2 ccb). Continue digoxin  - Continue aspirin, not on OAC due to frequent falls     Metabolic encephalopathy - likely due to flu and hypoxia, back to baseline per family  - Re-orient as able     CKD3 - renal function stable, monitor with diuresis     Troponin elevation - likely due to strain from flu, afib on top of CKD  - Continue aspirin  - Rate control as above  - Cardiology consulted     Thrombocytopenia - unclear cause, no acute bleeding, monitor     DM2 (chronic) - A1c 6.3, which is a bit low for a 80year old. BG acceptable even without his oral hypoglycemics  - Stop glipizide and linagliptin  - Continue diabetic diet  - SSI as needed     Hypothyroid (chronic) - continue home synthroid    FOLLOW-UP CARE RECOMMENDATIONS:     It is very important that you keep follow-up appointment(s). Bring discharge papers, medication list (and/or medication bottles) to follow-up appointments for review by outpatient provider(s).     ONGOING TREATMENT PLAN: PT/OT    Specific symptoms to watch for: chest pain, shortness of breath, fever, chills, nausea, vomiting, diarrhea, change in mentation, falling, weakness, bleeding. DIET:  Diabetic Diet    ACTIVITY:  Activity as tolerated and PT/OT Eval and Treat    Physical examination at discharge  Visit Vitals    /80 (BP 1 Location: Right arm, BP Patient Position: At rest)    Pulse 63    Temp 97.5 °F (36.4 °C)    Resp 18    Ht 6' (1.829 m)    Wt 72 kg (158 lb 11.7 oz)    SpO2 95%    BMI 21.53 kg/m2     Gen - NAD  HEENT - MMM  Neck - supple, full ROM  CV - regular rate, irregular rhythm, no MRG  Resp - lungs cta b/l, normal WOB  GI - abdomen S, NT, ND, +BS. No hepatosplenomegaly   - no CVA tenderness, bladder non-palpable in lower abdomen  MSK - normal tone and bulk, no edema  Neuro - A&O, no focal deficits  Psych - calm and cooperative with normal affect    Pertinent imaging studies:    CXR 2/5/18  FINDINGS: A single frontal view of the chest at 1442 hours shows perihilar  prominence of lung markings with diffuse mild interstitial prominence, improved  since the prior study 12/1/2017, but worsened since the prior study 9/29/2012. Deepti Heaps The heart, mediastinum and pulmonary vasculature are stable . The bony thorax  is unremarkable for age. . Radiopaque tubing projects over the right hemithorax  suggesting ventriculoperitoneal shunt tubing.     IMPRESSION  IMPRESSION:  Developing interstitial edema is suspected    CT chest 2/8/18  Chest:      Evaluation is somewhat limited due to respiratory artifact.     Lungs: The lung parenchyma is clear bilaterally. There is no pulmonary  parenchymal nodule, mass or focal airspace process.      Lymph nodes: There is no axillary, mediastinal or hilar lymphadenopathy.      Heart: The heart is of normal size and there is no pericardial effusion.      Pleura: There is trace bilateral pleural fluid. There is no pneumothorax.     Bones:  There are several chronic right rib fracture deformities.      Upper abdomen: There is lipomatous replacement of the visualized pancreas. The  visualized abdominal structures are otherwise normal.     IMPRESSION  IMPRESSION: Trace bilateral pleural fluid. CXR 2/8/18  FINDINGS: Right chest wall tubing is unchanged. Cardiomegaly and atherosclerotic  aortic arch are unchanged. Pulmonary vasculature is mildly prominent.      Reticular interstitial opacities are increased and moderate. No focal airspace  opacity. No pneumothorax. Rib fractures are unchanged.     IMPRESSION  IMPRESSION:     Increased moderate CHF pattern of pulmonary edema. CXR 2/9/18  FINDINGS:   A portable AP radiograph of the chest was obtained at 0725 hours. There is a  ventriculoperitoneal shunt tube coursing over the right chest.  Lines and tubes: The patient is on a cardiac monitor. Lungs: The lungs are clear. Pleura: There is no pneumothorax or pleural effusion. Mediastinum: The cardiac silhouette is enlarged and the aorta is atherosclerotic  and tortuous. Bones and soft tissues: There are old healed right rib fractures.     IMPRESSION  IMPRESSION: Cardiomegaly. No airspace disease or other acute abnormality. Recent Labs      02/10/18   0258  02/09/18   0840  02/08/18   1850   WBC  3.3*  3.3*  4.4   HGB  12.7  13.0  13.6   HCT  39.0  39.0  40.4   PLT  144*  136*  127*     Recent Labs      02/10/18   0258  02/09/18   0840  02/08/18   1507   NA  135*  138  138   K  3.6  3.5  3.7   CL  99  101  100   CO2  28  27  29   BUN  30*  28*  24*   CREA  1.38*  1.40*  1.55*   GLU  110*  99  85   CA  9.3  9.2  9.3     Recent Labs      02/08/18   1507   SGOT  24   AP  76   TP  8.6*   ALB  3.2*   GLOB  5.4*     No results for input(s): INR, PTP, APTT in the last 72 hours. No lab exists for component: INREXT   No results for input(s): FE, TIBC, PSAT, FERR in the last 72 hours. No results for input(s): PH, PCO2, PO2 in the last 72 hours. No results for input(s): CPK, CKMB in the last 72 hours.     No lab exists for component: TROPONINI  No components found for: GLPOC    Chronic Diagnoses:    Problem List as of 2/11/2018  Date Reviewed: 2/5/2018          Codes Class Noted - Resolved    Influenza A ICD-10-CM: J10.1  ICD-9-CM: 487.1  2/6/2018 - Present        * (Principal)Fever ICD-10-CM: R50.9  ICD-9-CM: 780.60  2/5/2018 - Present        NPH (normal pressure hydrocephalus) ICD-10-CM: G91.2  ICD-9-CM: 331.5  11/30/2017 - Present        Recurrent falls ICD-10-CM: R29.6  ICD-9-CM: V15.88  11/30/2017 - Present        Pneumonia ICD-10-CM: J18.9  ICD-9-CM: 170  11/29/2017 - Present        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 9  Unknown - Present        Dyslipidemia ICD-10-CM: E78.5  ICD-9-CM: 272.4  Unknown - Present        Neurological disorder ICD-10-CM: G98.8  ICD-9-CM: 349.9  Unknown - Present    Overview Signed 1/24/2011 11:00 AM by Teresa Givens MD     dizziness, tilt table test 5-4-07 unremarkable             Mitral regurgitation ICD-10-CM: I34.0  ICD-9-CM: 424.0  Unknown - Present    Overview Signed 1/24/2011 11:00 AM by Teresa Givens MD     echo 3-27-07             Orthostatic hypotension ICD-10-CM: I95.1  ICD-9-CM: 458.0  1/17/2011 - Present        Dizziness  ICD-10-CM: R42  ICD-9-CM: 780.4  1/17/2011 - Present        Atrial fibrillation (HCC) ICD-10-CM: I48.91  ICD-9-CM: 427.31  Unknown - Present    Overview Signed 1/24/2011 11:00 AM by Teresa Givens MD     CHRONIC             Stroke Cerebral Embolism With Cerebral Infract ICD-10-CM: I63.40  ICD-9-CM: 434.11  1/17/2011 - Present        HCVD (Hypertensive Cardiovasc Dis) Without Heart Failure ICD-10-CM: I11.9  ICD-9-CM: 402.90  1/17/2011 - Present        RESOLVED: Acute confusion ICD-10-CM: R41.0  ICD-9-CM: 293.0  9/29/2012 - 9/30/2012        RESOLVED: Presyncope ICD-10-CM: R55  ICD-9-CM: 780.2  1/17/2011 - 11/30/2017              Time spent on discharge related activities today greater than 30 minutes.       Signed:  Mabel Ruth MD                 Hospitalist, Internal Medicine      Cc: Rivas Ibarra MD

## 2018-02-11 NOTE — PROGRESS NOTES
Bedside and Verbal shift change report given to [facility]  (oncoming nurse) by Ramiro Eric RN  (offgoing nurse). Report included the following information SBAR.

## 2018-02-11 NOTE — PROGRESS NOTES
Cm informed that patient is ready for discharge today and will need to go to the [place] unit at [facility]. Cm contacted the nursing supervisor and they have a bed for patient, room 9339, third floor. Cm put report number on the folder and faxed discharge instructions and progress notes to them. Cm discussed this with patient and he is in agreement. Son will provide transportation around 12:30.   Bubba Alas

## 2018-03-03 NOTE — ED NOTES
RN noticed expressive aphasia. Called and spoke with caregiver at [facility], [facility], patient normally lucid, alert and oriented.  Code S called

## 2018-03-03 NOTE — ED TRIAGE NOTES
PT presetns via EMS from [facility]. Daughter \"thinks\" PT had a ground level fall at some point today, but does not know when. PT is complaining of upper back and neck pain. PT has bruise and abrasion to Left Upper arm. PT states he did hit his head when he fell.

## 2018-03-03 NOTE — ED PROVIDER NOTES
HPI Comments: 81 y/o male with PMHx of DM, HTN, HLD, atrial fibrillation, RBBB, mitral regurgitation, CKD, CVA, normal pressure hydrocephalus and Meniere's disease, presenting with complaint of fall. The patient is a poor historian due to difficulty speaking; he does endorse a fall in which he hit his head, but is unable to provide any details about his fall. He endorses neck pain and upper back pain that is rated 7/10. ROS unable to be obtained due to patient's difficulty speaking. The history is provided by the patient and the EMS personnel. Past Medical History:   Diagnosis Date    Acute confusion 9/29/2012    Atrial fibrillation ([address]     CHRONIC    Borderline diabetes mellitus     CKD (chronic kidney disease)     Diet-controlled type 2 diabetes mellitus ([address]     Dyslipidemia     Hypertension     Hypertensive cardiovascular disease     Meniere disease     Mitral regurgitation     echo 3-27-07    Neurological disorder     dizziness, tilt table test 5-4-07 unremarkable    NPH (normal pressure hydrocephalus)     Shunt Dr Shruthi Dailey MCV     Orthostatic hypotension     Other ill-defined conditions(799.89)     planter facia,2 achilles    Peripheral neuropathy     Stroke ([address]     4 TIA    TIA (transient ischemic attack)        Past Surgical History:   Procedure Laterality Date    HX APPENDECTOMY      HX CHOLECYSTECTOMY      HX HERNIA REPAIR      HX ORTHOPAEDIC      Left tkr,,rotator cuff    HX TURP           Family History:   Problem Relation Age of Onset    Heart Disease Father        Social History     Social History    Marital status:      Spouse name: N/A    Number of children: N/A    Years of education: N/A     Occupational History    Not on file.      Social History Main Topics    Smoking status: Never Smoker    Smokeless tobacco: Never Used    Alcohol use Yes      Comment: weekly    Drug use: No    Sexual activity: Not Currently     Other Topics Concern    Not on file     Social History Narrative    Lives [place]         ALLERGIES: Erythromycin; Penicillins; and Tetanus vaccines and toxoid    Review of Systems   Unable to perform ROS: Mental status change   (aphasia)    Vitals:    03/03/18 1752   Pulse: 86   Resp: 16   Temp: 98.5 °F (36.9 °C)   SpO2: 98%   Weight: 75.3 kg (166 lb)            Physical Exam   Constitutional: He appears well-developed and well-nourished. No distress. HENT:   Head: Normocephalic and atraumatic. Eyes: Conjunctivae and EOM are normal.   Cardiovascular: Normal rate and normal heart sounds. Pulmonary/Chest: Effort normal and breath sounds normal.   Abdominal: Soft. He exhibits no distension. There is tenderness (mild LLQ tenderness). There is no rebound and no guarding. Musculoskeletal:   Cervical spine with left-sided tenderness to palpation. No midline tenderness of thoracic or lumbar spine. Left upper arm with well-localized area of swelling, ecchymosis and tenderness to palpation, with overlying abrasion. No active bleeding. Neurological: He is alert. Patient with expressive aphasia, difficulty with word-finding and speaking more than 1-2 words at a time. Able to answer yes/no questions appropriately. Moving all extremities symmetrically. No dysarthria or facial droop. Skin: Skin is warm and dry. He is not diaphoretic. Nursing note and vitals reviewed. MDM  Number of Diagnoses or Management Options     Amount and/or Complexity of Data Reviewed  Clinical lab tests: ordered and reviewed  Tests in the radiology section of CPT®: reviewed and ordered  Discuss the patient with other providers: yes (Dr. Sheila Hernandez, ED attending)    Patient Progress  Patient progress: stable        ED Course       Procedures    81 y/o male with PMHx of DM, HTN, HLD, atrial fibrillation, RBBB, mitral regurgitation, CKD, CVA, normal pressure hydrocephalus and Meniere's disease, presenting with complaint of fall.  Concern for CVA or intracranial hemorrhage due to expressive aphasia which is not baseline for him. Last time normal unknown. Will obtain CT head w/o contrast, CT cervical spine, XR thoracic spine, XR left humerus, CBC, CMP, UA, troponin. ED EKG interpretation:  Rhythm: atrial fib and RBBB; and regular . Rate (approx.): 88; Axis: left axis deviation; ST/T wave: normal.  Interpreted by Dr. Cheng Joseph, 6:38 PM      Progress Note - 7:10 PM  I was informed by nursing staff that the patient's aphasia is worsening, he is no longer able to name objects and is having increased difficulty with speech. Consult Note - 7:15 PM  I have spoken with tele-neuro Dr. Belkis Pedroza, he recommends adding CTA head/neck, but advises that the patient is not a tPA candidate. Imaging studies pending. Care of this patient transferred to Dr. Cheng Joseph at approx 7:40 PM - please see his note for further ED course and disposition. The patient remained hemodynamically stable during my care.

## 2018-03-04 NOTE — DISCHARGE INSTRUCTIONS
Fatigue: Care Instructions  Your Care Instructions    Fatigue is a feeling of tiredness, exhaustion, or lack of energy. You may feel fatigue because of too much or not enough activity. It can also come from stress, lack of sleep, boredom, and poor diet. Many medical problems, such as viral infections, can cause fatigue. Emotional problems, especially depression, are often the cause of fatigue. Fatigue is most often a symptom of another problem. Treatment for fatigue depends on the cause. For example, if you have fatigue because you have a certain health problem, treating this problem also treats your fatigue. If depression or anxiety is the cause, treatment may help. Follow-up care is a key part of your treatment and safety. Be sure to make and go to all appointments, and call your doctor if you are having problems. It's also a good idea to know your test results and keep a list of the medicines you take. How can you care for yourself at home? · Get regular exercise. But don't overdo it. Go back and forth between rest and exercise. · Get plenty of rest.  · Eat a healthy diet. Do not skip meals, especially breakfast.  · Reduce your use of caffeine, tobacco, and alcohol. Caffeine is most often found in coffee, tea, cola drinks, and chocolate. · Limit medicines that can cause fatigue. This includes tranquilizers and cold and allergy medicines. When should you call for help? Watch closely for changes in your health, and be sure to contact your doctor if:  ? · You have new symptoms such as fever or a rash. ? · Your fatigue gets worse. ? · You have been feeling down, depressed, or hopeless. Or you may have lost interest in things that you usually enjoy. ? · You are not getting better as expected. Where can you learn more? Go to http://blayne-felix.info/. Enter Y765 in the search box to learn more about \"Fatigue: Care Instructions. \"  Current as of: March 20, 2017  Content Version: 11.4  © 6816-0323 Healthwise, [employer]. Care instructions adapted under license by [employer] (which disclaims liability or warranty for this information). If you have questions about a medical condition or this instruction, always ask your healthcare professional. [address] any warranty or liability for your use of this information. Weakness: Care Instructions  Your Care Instructions    Weakness is a lack of physical or muscle strength. You may feel that you need to make extra effort to move your arms, legs, or other muscles. Generalized weakness means that you feel weak in most areas of your body. Another type of weakness may affect just one muscle or group of muscles. You may feel weak and tired after you have done too much activity, such as taking an extra-long hike. This is not a serious problem. It often goes away on its own. Feeling weak can also be caused by medical conditions like thyroid problems, depression, or a virus. Sometimes the cause can be serious. Your doctor may want to do more tests to try to find the cause of the weakness. The doctor has checked you carefully, but problems can develop later. If you notice any problems or new symptoms, get medical treatment right away. Follow-up care is a key part of your treatment and safety. Be sure to make and go to all appointments, and call your doctor if you are having problems. It's also a good idea to know your test results and keep a list of the medicines you take. How can you care for yourself at home? · Rest when you feel tired. · Be safe with medicines. If your doctor prescribed medicine, take it exactly as prescribed. Call your doctor if you think you are having a problem with your medicine. You will get more details on the specific medicines your doctor prescribes. · Do not skip meals. Eating a balanced diet may increase your energy level.   · Get some physical activity every day, but do not get too tired. When should you call for help? Call your doctor now or seek immediate medical care if:  ? · You have new or worse weakness. ? · You are dizzy or lightheaded, or you feel like you may faint. ? Watch closely for changes in your health, and be sure to contact your doctor if:  ? · You do not get better as expected. Where can you learn more? Go to http://blayne-felix.info/. Enter 237 [id number] [id number] in the search box to learn more about \"Weakness: Care Instructions. \"  Current as of: March 20, 2017  Content Version: 11.4  © 6564-6414 [employer]. Care instructions adapted under license by [employer] (which disclaims liability or warranty for this information). If you have questions about a medical condition or this instruction, always ask your healthcare professional. [address] any warranty or liability for your use of this information. Preventing Falls: Care Instructions  Your Care Instructions    Getting around your home safely can be a challenge if you have injuries or health problems that make it easy for you to fall. Loose rugs and furniture in walkways are among the dangers for many older people who have problems walking or who have poor eyesight. People who have conditions such as arthritis, osteoporosis, or dementia also have to be careful not to fall. You can make your home safer with a few simple measures. Follow-up care is a key part of your treatment and safety. Be sure to make and go to all appointments, and call your doctor if you are having problems. It's also a good idea to know your test results and keep a list of the medicines you take. How can you care for yourself at home? Taking care of yourself  · You may get dizzy if you do not drink enough water. To prevent dehydration, drink plenty of fluids, enough so that your urine is light yellow or clear like water. Choose water and other caffeine-free clear liquids.  If you have kidney, heart, or liver disease and have to limit fluids, talk with your doctor before you increase the amount of fluids you drink. · Exercise regularly to improve your strength, muscle tone, and balance. Walk if you can. Swimming may be a good choice if you cannot walk easily. · Have your vision and hearing checked each year or any time you notice a change. If you have trouble seeing and hearing, you might not be able to avoid objects and could lose your balance. · Know the side effects of the medicines you take. Ask your doctor or pharmacist whether the medicines you take can affect your balance. Sleeping pills or sedatives can affect your balance. · Limit the amount of alcohol you drink. Alcohol can impair your balance and other senses. · Ask your doctor whether calluses or corns on your feet need to be removed. If you wear loose-fitting shoes because of calluses or corns, you can lose your balance and fall. · Talk to your doctor if you have numbness in your feet. Preventing falls at home  · Remove raised doorway thresholds, throw rugs, and clutter. Repair loose carpet or raised areas in the floor. · Move furniture and electrical cords to keep them out of walking paths. · Use nonskid floor wax, and wipe up spills right away, especially on ceramic tile floors. · If you use a walker or cane, put rubber tips on it. If you use crutches, clean the bottoms of them regularly with an abrasive pad, such as steel wool. · Keep your house well lit, especially [place]ma Early, and outside walkways. Use night-lights in areas such as hallways and bathrooms. Add extra light switches or use remote switches (such as switches that go on or off when you clap your hands) to make it easier to turn lights on if you have to get up during the night. · Install sturdy handrails on stairways. · Move items in your cabinets so that the things you use a lot are on the lower shelves (about waist level).   · Keep a cordless phone and a flashlight with new batteries by your bed. If possible, put a phone in each of the main rooms of your house, or carry a cell phone in case you fall and cannot reach a phone. Or, you can wear a device around your neck or wrist. You push a button that sends a signal for help. · Wear low-heeled shoes that fit well and give your feet good support. Use footwear with nonskid soles. Check the heels and soles of your shoes for wear. Repair or replace worn heels or soles. · Do not wear socks without shoes on wood floors. · Walk on the grass when the sidewalks are slippery. If you live in an area that gets snow and ice in the winter, sprinkle salt on slippery steps and sidewalks. Preventing falls in the bath  · Install grab bars and nonskid mats inside and outside your shower or tub and near the toilet and sinks. · Use shower chairs and bath benches. · Use a hand-held shower head that will allow you to sit while showering. · Get into a tub or shower by putting the weaker leg in first. Get out of a tub or shower with your strong side first.  · Repair loose toilet seats and consider installing a raised toilet seat to make getting on and off the toilet easier. · Keep your bathroom door unlocked while you are in the shower. Where can you learn more? Go to http://blayne-felix.info/. Enter [id number] [id number] in the search box to learn more about \"Preventing Falls: Care Instructions. \"  Current as of: May 12, 2017  Content Version: 11.4  © 3489-9880 [employer]. Care instructions adapted under license by [employer] (which disclaims liability or warranty for this information). If you have questions about a medical condition or this instruction, always ask your healthcare professional. [address] any warranty or liability for your use of this information.

## 2018-03-04 NOTE — ED NOTES
Spoke with PA about possible straight catheretization for urine sample. PA does not want catheretization at this time.

## 2018-03-04 NOTE — ED NOTES
7:45 PM  Dr. Chuck Elliott: Change of shift. Care of patient taken over from [place], [place]; H&P reviewed, handoff complete. Awaiting labs/imaging/consultant. CONSULT NOTE:  7:52 PM Leonie Stroud MD spoke with Dr. Fatou Nguyễn, Consult for Tele-Neurology. Discussed available diagnostic tests and clinical findings. Provider is in agreement with care plans as outlined. Dr. Fatou Nguyễn recommends admitting the patient to the hospitalist for further work up. PROGRESS NOTE:  8:01 PM  Patient's son reports the patient has frequent similar stroke-like presentations with other metabolic causes. Agrees with going without a contrast study, which would put the patient's renal function at risk. 10:42 PM  Current evaluation otherwise unremarkable, will obtain urine for source of AMS, provide ativan for agitation. 11:01 PM  Complete evaluation unremarkable. Discussed with family at bedside and they are declining admission for further evaluation. Will DC home with return precautions and PCP follow up.

## 2018-03-04 NOTE — ED NOTES
Pt and family given discharge instructions, patient education, and follow-up information. Pt states understanding - all questions answered. Pt discharged to assisted living facility in private vehicle, ambulatory in wheelchair with family. Pt alert, RA, with pain controlled.

## 2018-03-04 NOTE — ED NOTES
Bedside report received from Alexandra Morris RN.    8858 Straight cath performed with sterile procedure.  Urine specimen obtained - drained 500 ml

## 2018-03-04 NOTE — ED NOTES
Report called and given to Sharron Hurt RN at [facility]. Patient's family as agreed to give pt ride back to facility. Dr Doni Chirinos has printed a prescription for \"Admit To Healthcare\" which JOSE RAMON Dempsey states should be fine for pt to return to facility.

## 2018-03-05 PROBLEM — S06.5XAA SUBDURAL HEMATOMA: Status: ACTIVE | Noted: 2018-01-01

## 2018-03-05 NOTE — PROGRESS NOTES
EMR reviewed (hospitalization 2/5/18-2/2/11/18). Patient is a resident at [facility]. Contact made w/ [facility] [phone] - informed [facility] his apartment on 3/4/18 facility requesting medical release form from 3/3/18 ED visit. CM noted son/DIONNE Velazquez. SSED/CM will follow for transitional care needs. [employer] is insurance provider. PCP is Latanya Kaur. Care Management Interventions  PCP Verified by CM:  Yes  Palliative Care Criteria Met (RRAT>21 & CHF Dx)?: No  Transition of Care Consult (CM Consult):  (hospitalization 2/5/18-2/11/18)  MyChart Signup: No  Discharge Durable Medical Equipment: No  Health Maintenance Reviewed: Yes  Physical Therapy Consult: No  Occupational Therapy Consult: No  Speech Therapy Consult: No  Current Support Network: Nursing Facility  Confirm Follow Up Transport:  (TBD)  Plan discussed with Pt/Family/Caregiver: Yes   Resource Information Provided?: No  Discharge Location  Discharge Placement: Skilled nursing facility

## 2018-03-05 NOTE — ED NOTES
2:01 PMChange of shift. Care of patient taken over from Dr Aleena Burrell; H&P reviewed. Awaiting labs/imaging/consultant. Handoff complete. 10:41 PM  Later entry - pt found to have right > left SDH with small amount of subarachnoid blood. Combative and restless throughout the ED stay. Repeated Ativan IV and finally on Ativan gtt. He was admitted to the hospitalist service and discussed with Dr Joeann Frankel as well as neurosurgery who will consult.

## 2018-03-05 NOTE — ED NOTES
Pt restless and attempting to get out of bed. RNx3 at bedside to reposition pt. IVF infusing. VSS.  Family at bedside

## 2018-03-05 NOTE — ED PROVIDER NOTES
HPI Comments: 80 y.o. male with past medical history significant for DM, HTN, HLD, atrial fibrillation, RBBB, mitral regurgitation, CKD, CVA, normal pressure hydrocephalus and Meniere's disease who presents from [facility] via EMS with chief complaint of AMS. Son reports that the pt was seen for fall two days ago in the ED and has had declining metal status ever since, but was not as bad yesterday as he is today. Son cites the pt as being more agitated and holding his head stating Annye Loop it stop. \" He has been decreasingly able to communicate otherwise, and son has not seen the pt up and walking since last week. Pt has not received any medications today PTA per son. EMS notes the pt's BGL to be 245. There are no other acute medical concerns at this time. PCP: Reyes Regulus, MD    Old Chart Review: Pt was seen in the ED 3/3/18 for fall and weakness. All imaging at that time was negative for any acute findings (XR chest, T-spine, Left humerus, and CT head and C-spine). Pt was discharged home to f/ with PCP. Full history, physical exam, and ROS unable to be obtained due to:  AMS. Note written by Jass Marcial, as dictated by Niurka Hummel MD 12:44 PM      The history is provided by the EMS personnel and a relative (son). The history is limited by the condition of the patient (AMS). No  was used.         Past Medical History:   Diagnosis Date    Acute confusion 9/29/2012    Atrial fibrillation (HCC)     CHRONIC    Borderline diabetes mellitus     CKD (chronic kidney disease)     Diet-controlled type 2 diabetes mellitus ([facility] [address]     Dyslipidemia     Hypertension     Hypertensive cardiovascular disease     Meniere disease     Mitral regurgitation     echo 3-27-07    Neurological disorder     dizziness, tilt table test 5-4-07 unremarkable    NPH (normal pressure hydrocephalus)     Shunt Dr Bettye Greene MCV     Orthostatic hypotension     Other ill-defined conditions(799.89)     planter facia,2 achilles    Peripheral neuropathy     Stroke ([facility] [address]     4 TIA    TIA (transient ischemic attack)        Past Surgical History:   Procedure Laterality Date    HX APPENDECTOMY      HX CHOLECYSTECTOMY      HX HERNIA REPAIR      HX ORTHOPAEDIC      Left tkr,,rotator cuff    HX TURP           Family History:   Problem Relation Age of Onset    Heart Disease Father        Social History     Social History    Marital status:      Spouse name: N/A    Number of children: N/A    Years of education: N/A     Occupational History    Not on file. Social History Main Topics    Smoking status: Never Smoker    Smokeless tobacco: Never Used    Alcohol use Yes      Comment: weekly    Drug use: No    Sexual activity: Not Currently     Other Topics Concern    Not on file     Social History Narrative    Lives [place]         ALLERGIES: Erythromycin; Penicillins; and Tetanus vaccines and toxoid    Review of Systems   Unable to perform ROS: Mental status change       There were no vitals filed for this visit. Physical Exam   Constitutional: He appears well-developed. No distress. HENT:   Head: Normocephalic and atraumatic. Mouth/Throat: Mucous membranes are dry. Eyes: Conjunctivae and EOM are normal.   Neck: Normal range of motion. Neck supple. Cardiovascular: Normal heart sounds. An irregularly irregular rhythm present. Tachycardia present. No murmur heard. Pulmonary/Chest: Effort normal and breath sounds normal. No respiratory distress. Abdominal: Soft. Bowel sounds are normal. He exhibits no distension. There is no rebound. Diffuse mild abdominal tenderness   Musculoskeletal: Normal range of motion. He exhibits no edema or tenderness. Left humerus tender and without any notable deformity. Neurological: He is alert. No cranial nerve deficit. He exhibits normal muscle tone. Coordination normal.   Skin: Skin is warm and dry. Area of ecchymosis to the proximal left humerus. Psychiatric:   Seems confused and agitated. Nursing note and vitals reviewed. Note written by Jass Dias, as dictated by Jud Duong MD 12:47 PM      [facility]      ED Course       Procedures    ED EKG interpretation (12:50 PM): Rhythm: atrial fib with RBBB; and irregular. Rate (approx.): 93; Axis: left axis deviation; ST/T wave: No STEMI. Note written by Jass Dias, as dictated by Jud Duong MD 1:15 PM         2:00 PM  Change of shift. Care of patient signed over to Dr. Arya Kathleen. Handoff complete. CONSULT NOTE:  6:08 PM Suly Fisher MD spoke with Dr. Amado Franco, Consult for Neurosurgery. Discussed available diagnostic tests and clinical findings. Dr. Amado Franco agrees to come see the patient in consult.

## 2018-03-05 NOTE — PROGRESS NOTES
SSED Pharmacy Consult:    Recommendations: The patient is taking pramipexole 0.125 mg nightly for restless leg syndrome. Pramipexole can worsen restless legs, cause orthostatic hypotension, and may cause or exacerbate dyskinesia. Patient is on a low dose, but risk of orthostatic hypotension is something to consider as patient has a history of falls. In addition, he is taking Lyrica 400 mg nightly for neuropathy. Lyrica can cause dizziness, drowsiness, and equilibrium disturbance. Medication list obtained from: external medical record ([address] medication list), Galilea (nurse at [facility])      Significant PMH/Disease States:   Past Medical History:   Diagnosis Date    Acute confusion 9/29/2012    Atrial fibrillation ([facility] [address]     CHRONIC    Borderline diabetes mellitus     CKD (chronic kidney disease)     Diet-controlled type 2 diabetes mellitus ([facility] [address]     Dyslipidemia     Hypertension     Hypertensive cardiovascular disease     Meniere disease     Mitral regurgitation     echo 3-27-07    Neurological disorder     dizziness, tilt table test 5-4-07 unremarkable    NPH (normal pressure hydrocephalus)     Shunt Dr Kofi Kimble MCV     Orthostatic hypotension     Other ill-defined conditions(799.89)     planter facia,2 achilles    Peripheral neuropathy     Stroke ([facility] [address]     4 TIA    TIA (transient ischemic attack)        Chief Complaint for this Admission:    Chief Complaint   Patient presents with    Altered mental status    Headache       Allergies:  Erythromycin; Penicillins; and Tetanus vaccines and toxoid    Prior to Admission Medications:   Prior to Admission Medications   Prescriptions Last Dose Informant Patient Reported? Taking? DULoxetine (CYMBALTA) 30 mg capsule   Yes Yes   Sig: Take 30 mg by mouth nightly. Lactobacillus Acidoph & Bulgar [facility]) 1 million cell tab tablet   Yes Yes   Sig: Take 2 Tabs by mouth every twelve (12) hours.    acetaminophen (TYLENOL) 325 mg tablet   Yes Yes   Sig: Take 650 mg by mouth every six (6) hours as needed for Pain. (Do not exceed 3 grams in 24 hours)   aspirin delayed-release 81 mg tablet   Yes Yes   Sig: Take 1 Tab by mouth daily. cholecalciferol, VITAMIN D3, (VITAMIN D3) 5,000 unit tab tablet   Yes Yes   Sig: Take 5,000 Units by mouth daily (with lunch). clindamycin (CLEOCIN) 300 mg capsule   Yes Yes   Sig: Take 600 mg by mouth once as needed (Prior to dental procedures). digoxin (LANOXIN) 0.125 mg tablet   Yes Yes   Sig: Take 0.125 mg by mouth daily. (On MON, WED, THURS, SAT patient takes an additional evening dose)   digoxin (LANOXIN) 0.125 mg tablet   Yes Yes   Sig: Take 0.125 mg by mouth four (4) days a week. Take QPM on MON, WED, THURS, SAT only. (In addition to AM dose)   dilTIAZem CD (CARDIZEM CD) 120 mg ER capsule   Yes Yes   Sig: Take 1 Cap by mouth daily. finasteride (PROSCAR) 5 mg tablet   Yes Yes   Sig: Take 5 mg by mouth daily. fish oil-omega-3 fatty acids 340-1,000 mg capsule   Yes Yes   Sig: Take 1 Cap by mouth two (2) times a day. Lunch and Bedtime   hydrALAZINE (APRESOLINE) 25 mg tablet   Yes Yes   Sig: Take 25 mg by mouth every eight (8) hours as needed. (For systolic BP >872 mmHg)   ipratropium (ATROVENT) 0.02 % soln   No Yes   Si.5 mL by Nebulization route every six (6) hours as needed. For wheezing. levothyroxine (SYNTHROID) 100 mcg tablet   Yes Yes   Sig: Take 100 mcg by mouth Daily (before breakfast). omeprazole (PRILOSEC) 20 mg capsule   Yes Yes   Sig: Take 20 mg by mouth Daily (before breakfast). pramipexole (MIRAPEX) 0.125 mg tablet   Yes Yes   Sig: Take 0.125 mg by mouth nightly. pregabalin (LYRICA) 200 mg capsule   Yes Yes   Sig: Take 400 mg by mouth nightly. raNITIdine (ZANTAC) 300 mg tablet   Yes Yes   Sig: Take 300 mg by mouth nightly. simethicone (MYLICON) 80 mg chewable tablet   Yes Yes   Sig: Take 80 mg by mouth three (3) times daily.  Indications: FLATULENCE   simvastatin (ZOCOR) 20 mg tablet   Yes Yes   Sig: Take 20 mg by mouth nightly. tamsulosin (FLOMAX) 0.4 mg capsule   Yes Yes   Sig: Take 0.4 mg by mouth nightly. traZODone (DESYREL) 100 mg tablet   Yes Yes   Sig: Take 200 mg by mouth nightly. Facility-Administered Medications: None         Thank you for this consult. Please contact the pharmacist () with any questions.     Robinson Matamoros, PharmD Candidate Class of [id number]

## 2018-03-05 NOTE — ED TRIAGE NOTES
Pt arrives via EMS from [facility] for [address] since Saturday but worse today. Pt c/o headache. Last known normal Saturday.  per EMS. Dr. Lee Sensor at bedside to evaluate pt.

## 2018-03-06 NOTE — ED NOTES
Patient transferred to ICU on monitor x3 with RN and Medic. VSS. Ativan infusion given to ICU RN, Gloria.

## 2018-03-06 NOTE — ROUTINE PROCESS
TRANSFER - OUT REPORT:    Verbal report given to Traci ALBRIGHT(name) on Edvin Arciniega  being transferred to ICU 7(unit) for routine progression of care       Report consisted of patients Situation, Background, Assessment and   Recommendations(SBAR). Information from the following report(s) SBAR, ED Summary, MAR, Recent Results and Cardiac Rhythm Afib was reviewed with the receiving nurse. Lines:   Peripheral IV 03/05/18 Left Antecubital (Active)   Site Assessment Clean, dry, & intact 3/5/2018  1:23 PM   Phlebitis Assessment 0 3/5/2018  1:23 PM   Infiltration Assessment 0 3/5/2018  1:23 PM   Dressing Status Clean, dry, & intact 3/5/2018  1:23 PM   Dressing Type Transparent 3/5/2018  1:23 PM   Hub Color/Line Status Patent; Flushed;Pink; Infusing 3/5/2018  1:23 PM   Action Taken Blood drawn 3/5/2018  1:23 PM   Alcohol Cap Used No 3/5/2018  1:23 PM       Peripheral IV 03/05/18 Left Arm (Active)   Site Assessment Clean, dry, & intact 3/5/2018  3:23 PM   Phlebitis Assessment 0 3/5/2018  3:23 PM   Infiltration Assessment 0 3/5/2018  3:23 PM   Dressing Status Clean, dry, & intact 3/5/2018  3:23 PM   Dressing Type Transparent 3/5/2018  3:23 PM   Hub Color/Line Status Pink;Patent; Flushed; Infusing 3/5/2018  3:23 PM   Alcohol Cap Used No 3/5/2018  3:23 PM        Opportunity for questions and clarification was provided.       Patient transported with:   Monitor  Registered Nurse  Tech

## 2018-03-06 NOTE — PROGRESS NOTES
Problem: Falls - Risk of  Goal: *Absence of Falls  Document Nancy Fall Risk and appropriate interventions in the flowsheet.    Outcome: Progressing Towards Goal  Fall Risk Interventions:  Mobility Interventions: Assess mobility with egress test, Patient to call before getting OOB, Strengthening exercises (ROM-active/passive), Utilize walker, cane, or other assitive device, Utilize gait belt for transfers/ambulation, PT Consult for assist device competence, PT Consult for mobility concerns         Medication Interventions: Assess postural VS orthostatic hypotension, Evaluate medications/consider consulting pharmacy, Patient to call before getting OOB, Teach patient to arise slowly, Utilize gait belt for transfers/ambulation    Elimination Interventions: Call light in reach, Toilet paper/wipes in reach, Toileting schedule/hourly rounds, Patient to call for help with toileting needs    History of Falls Interventions: Bed/chair exit alarm, Door open when patient unattended, Evaluate medications/consider consulting pharmacy, Investigate reason for fall, Room close to nurse's station, Utilize gait belt for transfer/ambulation

## 2018-03-06 NOTE — PROGRESS NOTES
Spiritual Care Assessment/Progress Note  [facility]      NAME: Jamaica Drake      MRN: [id number]  AGE: 80 y.o.  SEX: male  Moravian Affiliation: [facility]   Language: English     3/6/2018     Total Time (in minutes): 15     Spiritual Assessment begun in [facility] through conversation with:         []Patient        [x] Family    [] Friend(s)        Reason for Consult: Request by staff     Spiritual beliefs: (Please include comment if needed)     [x] Involved in a mindy tradition/spiritual practice:     [x] Supported by a mindy community:      [] Claims no spiritual orientation:      [] Seeking spiritual identity:           [] Adheres to an individual form of spirituality:      [] Not able to assess:                     Identified resources for coping:      [] Prayer                  [] Devotional reading               [] Music                  [] Guided Imagery     [x] Family/friends                 [] Pet visits     [] Other: mindy community       Interventions offered during this visit: (See comments for more details)    Patient Interventions: Interdisciplinary rounds     Family/Friend(s): Catharsis/review of pertinent events in supportive environment, Coping skills reviewed/reinforced, Iconic (affirming the presence of God/Higher Power), Normalization of emotional/spiritual concerns, Prayer (assurance of), Moravian beliefs/image of God discussed     Plan of Care:     [] Discuss Spiritual/Cultural needs    [] Support AMD and/or advance care planning process      [] Support grieving process   [] Coordinate Rites/Rituals    [] Coordination with community clergy   [x] No spiritual needs identified at this time   [] Detailed Plan of Care below (See Comments)  [] Make referral to Music Therapy  [] Make referral to Pet Therapy     [] Make referral to Addiction services  [] Make referral to [facility]  [] Make referral to Spiritual Care Partner  [] No future visits requested Responded to invitation from [address] [place] [address] to provide spiritual support to Mr. Benjamin Carrel and family. Arrived at room 7107 and found son Raquel Calderon, who lives in [place], in room. He welcomed visit and spoke affectionately of his father--acutely aware of his pending death. No indication of anxiety from Raquel Calderon. His father appeared to be resting comfortably and quietly---beyond an occasional leg movement. Raquel Calderon shared that his mother  2 years ago and his father has been residing at ---in the nursing unit after some recent medical issues. The family had been long-time members at [address] [place] [place] [address] until some internal distention at the [employer] resulted in a move to [employer] and his wife had been loyal and active members. Raquel Calderon indicated that his father was a person of mindy and that there was no immediate need of spiritual care. Assured him of prayer and of the availability of  support if needed. [facility]'s Staff  (Jay Anderson Patient Care Specialist)   Paging Service [phone])

## 2018-03-06 NOTE — CONSULTS
[address] [facility]  MR#: [id number]  : 1923  ACCOUNT #: [de-identified]   DATE OF SERVICE: 2018    REASON FOR CONSULTATION:  Intracranial hemorrhage. HISTORY OF PRESENT ILLNESS:  This is a 79-year-old gentleman who lives in an assisted living facility. He has had several recent falls. He had a fall on 2018. He was brought to the emergency room. Head CT at that time was unremarkable. However, he did seem to have a change in his mentation. He was becoming increasingly confused, combative, and lethargic. He was brought back to the emergency room yesterday evening. Of note, the son reported that there had been an additional fall since he had returned to his facility. Workup in the emergency room included a head CT that revealed small bilateral subdurals without significant mass effect. The patient was very agitated and very combative. He did have an Ativan IV drip in the emergency room for his agitation. He was then transferred to the ICU. In the ICU, he required a cardene IV drip until his blood pressure was finally well controlled. He then became increasingly obtunded and unresponsive even after he had not received Ativan for some period of time. He had a stat head CT and this head CT revealed marked increase in the size of his subdural collections. He has not had any witnessed seizure activity. He is not on any anticoagulation. He does have a  shunt that has been placed by Dr. Jackie Kaur  for normal pressure hydrocephalus. It was placed in  and was set at 80 according to the patient's son. PAST MEDICAL HISTORY:  Atrial fibrillation. He is not on any anticoagulation because of his risk of falls. Chronic kidney disease, hyperlipidemia, hypertension, normal pressure hydrocephalus, stroke.     PAST SURGICAL HISTORY:  Appendectomy, cholecystectomy, hernia repair, left rotator cuff surgery, TURP, placement of  shunt.    CURRENT MEDICATIONS:  Pepcid 20 mg IV q.12 h., Cardizem drip, titrate for heart rate less than 100, Ativan p.r.n., Cardene drip, titrate for BP less than 160, Keppra 1000 mg b.i.d. ALLERGIES:  ERYTHROMYCIN, PENICILLIN, TETANUS VACCINE. SOCIAL HISTORY:  He is . He lives in assisted living facility. No history of smoking or alcohol use. FAMILY HISTORY:  Cardiac disease. REVIEW OF SYSTEMS:  Unobtainable. PHYSICAL EXAMINATION:  VITAL SIGNS:  Blood pressure 140/62, pulse 96. GENERAL:  This is a well-developed, well-nourished gentleman who is examined lying in the hospital bed. NEUROLOGIC:  Eyes are closed to pain. His pupils are 3 mm and responsive. No verbal output. He has extensor posturing to central pain. No spontaneous movement. IMAGING STUDIES:  He has a head CT that shows increase in bilateral subdural collections with compression of the ventricles. This is a change when compared to previous study that showed very small subdural hematomas. LABORATORY STUDIES: Platelets 651. INR 1.2. ASSESSMENT AND PLAN:  This is a 69-year-old gentleman who has had multiple recent falls. He had a head CT earlier today that showed small bilateral subdurals without significant mass effect; however, he was very agitated and combative. This coincided with increased blood pressure. He did receive treatment for both blood pressure and agitation; however, he became increasingly obtunded. Head CT shows marked increase in the size of the subdural collections. I have adjusted his shunt valve, which by CT appears to be a Codman programmable valve. I have adjusted it to a setting of 200,essentially turning if off. Will see if this helps decrease his subdural collections. I have attempted to contact the patient's family. He is a DNR at this point. We will continue with aggressive medical treatment with keeping his blood pressure controlled and repeat his scan in several hours. Thank you for this consultation.       MD BRENDA Vegas / LILY  D: 03/06/2018 02:54     T: 03/06/2018 08:21  JOB #: [id number]

## 2018-03-06 NOTE — PROGRESS NOTES
Hospitalist Progress Note  Juni Iraheta MD  Answering service: [phone] OR [phone] from in house phone         Date of Service:  3/6/2018  NAME:  Abiel Cerda  :  1923  MRN:  [id number]      Admission Summary:     History of Present Illness:   Abiel Cerda is a 80 y.o. male with history that include chronic AFIB not on [address] due to fall risk and NPH with  shunt presents with AMS which has been worsening since a fall on 3/3/18. At that time CT of head was negative, his family noticed a change in mentation which continued to worsen. Today he was extremely confused, combative, and lethargic. He was brought back to the ER where he was this time found to have a right sided subdural hematoma. In addition the son reports that he sustained anoghter GLF shortly upon returning to the facility where he lives at following the ER visit on 3/3/18. Unable to obtain history or ROS due to clinical condition. He is very obtunded but slightly agitated due to confusion. Required ativan IV and then drip in ER to help with Agitation.         Interval history / Subjective:       3/6:  Spent time with son in pt's room, confimed: plan is for comfort care, seems best to go hospice        Assessment & Plan:       1. Acute encephalopathy secondary to subdural hematoma. 2. NPH with  shunt  3. Chronic AFIB not on  OAC due to fall risk  4. BPH  5. HTN essential with elevated BP POA  6. DNR          Code status: DNR  DVT prophylaxis: scd's    Care Plan discussed with: Patient/Family and Nurse  Disposition: Hospice  and TBD     Hospital Problems  Date Reviewed: 2018          Codes Class Noted POA    Subdural hematoma ([facility] [address] ICD-10-CM: I62.00  ICD-9-CM: 432.1  3/5/2018 Unknown                Review of Systems:   Review of systems not obtained due to patient factors. Vital Signs:    Last 24hrs VS reviewed since prior progress note.  Most recent are:  Visit Vitals    /63    Pulse 98    Temp (!) 102.4 °F (39.1 °C)    Resp (!) 38    Ht 6' (1.829 m)    Wt 70.5 kg (155 lb 6.4 oz)    SpO2 97%    BMI 21.08 kg/m2         Intake/Output Summary (Last 24 hours) at 03/06/18 1225  Last data filed at 03/06/18 1100   Gross per 24 hour   Intake          1553.67 ml   Output              345 ml   Net          1208.67 ml        Physical Examination:             Constitutional:  pre terminal                             Neurologic:  wd non verbal                  Labs:     Recent Labs      03/06/18   0448  03/05/18   1311   WBC  10.8  8.2   HGB  13.9  14.4   HCT  42.0  42.5   PLT  155  157     Recent Labs      03/06/18   0448  03/05/18   1311  03/03/18   1810   NA  144  141  140   K  3.8  3.8  3.8   CL  112*  107  106   CO2  22  22  26   BUN  30*  25*  22*   CREA  1.59*  1.62*  1.70*   GLU  279*  250*  193*   CA  9.2  9.9  10.0   MG   --   1.6   --      Recent Labs      03/05/18   1311   SGOT  28   ALT  15   AP  100   TBILI  2.5*   TP  9.0*   ALB  3.8   GLOB  5.2*     Recent Labs      03/05/18   1239  03/03/18   1809   INR  1.2*  1.1      No results for input(s): FE, TIBC, PSAT, FERR in the last 72 hours. No results found for: FOL, RBCF   No results for input(s): PH, PCO2, PO2 in the last 72 hours.   Recent Labs      03/05/18   1311  03/03/18   1810   TROIQ  <0.04  <0.04     Lab Results   Component Value Date/Time    Cholesterol, total 157 02/17/2011 05:22 AM    HDL Cholesterol 60 02/17/2011 05:22 AM    LDL, calculated 68.4 02/17/2011 05:22 AM    Triglyceride 143 02/17/2011 05:22 AM    CHOL/HDL Ratio 2.6 02/17/2011 05:22 AM     Lab Results   Component Value Date/Time    Glucose (POC) 268 (H) 03/05/2018 12:38 PM    Glucose (POC) 181 (H) 03/03/2018 06:07 PM    Glucose (POC) 219 (H) 02/11/2018 11:36 AM    Glucose (POC) 113 (H) 02/11/2018 06:13 AM    Glucose (POC) 219 (H) 02/10/2018 09:18 PM     Lab Results   Component Value Date/Time    Color YELLOW/STRAW 03/05/2018 01:11 PM    Appearance CLEAR 03/05/2018 01:11 PM    Specific gravity 1.021 03/05/2018 01:11 PM    Specific gravity >1.030 (H) 04/04/2010 04:00 PM    pH (UA) 6.5 03/05/2018 01:11 PM    Protein 100 (A) 03/05/2018 01:11 PM    Glucose 100 (A) 03/05/2018 01:11 PM    Ketone TRACE (A) 03/05/2018 01:11 PM    Bilirubin NEGATIVE  03/03/2018 10:08 PM    Urobilinogen 1.0 03/05/2018 01:11 PM    Nitrites NEGATIVE  03/05/2018 01:11 PM    Leukocyte Esterase NEGATIVE  03/05/2018 01:11 PM    Epithelial cells FEW 03/05/2018 01:11 PM    Bacteria NEGATIVE  03/05/2018 01:11 PM    WBC 0-4 03/05/2018 01:11 PM    RBC 0-5 03/05/2018 01:11 PM         Medications Reviewed:     Current Facility-Administered Medications   Medication Dose Route Frequency    levETIRAcetam (KEPPRA) 1,000 mg in 0.9% sodium chloride 100 mL IVPB  1,000 mg IntraVENous Q12H    haloperidol (HALDOL) 2 mg/mL oral solution 2 mg  2 mg SubLINGual Q6H PRN    Or    haloperidol lactate (HALDOL) injection 2 mg  2 mg IntraVENous Q6H PRN    LORazepam (ATIVAN) injection 1 mg  1 mg IntraVENous Q15MIN PRN    glycopyrrolate (ROBINUL) injection 0.2 mg  0.2 mg IntraVENous Q4H PRN    ketorolac (TORADOL) injection 30 mg  30 mg IntraVENous Q8H PRN    HYDROmorphone (PF) (DILAUDID) injection 0.5 mg  0.5 mg IntraVENous Q15MIN PRN    LORazepam (ATIVAN) injection 1 mg  1 mg IntraVENous QHS    sodium chloride (NS) flush 5-10 mL  5-10 mL IntraVENous Q8H    sodium chloride (NS) flush 5-10 mL  5-10 mL IntraVENous PRN    ondansetron (ZOFRAN) injection 4 mg  4 mg IntraVENous Q4H PRN    haloperidol lactate (HALDOL) injection 2 mg  2 mg IntraVENous Q4H PRN    acetaminophen (TYLENOL) suppository 650 mg  650 mg Rectal Q6H PRN     ______________________________________________________________________  EXPECTED LENGTH OF STAY: 4d 19h  ACTUAL LENGTH OF STAY:          1                 Khalif Figueroa MD

## 2018-03-06 NOTE — PROGRESS NOTES
2818: Attempted to take patient down for 0600 CT scan but CT tech informed me that she is unable to take patient at this time, will call when ready.

## 2018-03-06 NOTE — PROGRESS NOTES
Pt case discussed today with Palliative IDT. Pt is a member of [facility] and has been visited by his . Chaplains will attempt to visit for initial spiritual assessment and to offer support.     Angela Mazariegos, Palliative

## 2018-03-06 NOTE — PROGRESS NOTES
Day #1 of Levaquin  Indication:  sepsis  Current regimen:  500 mg Q 24 hr  Abx regimen: Levaquin+Clindamycin  Recent Labs      18   0448  18   1311  18   1810   WBC  10.8  8.2  6.9   CREA  1.59*  1.62*  1.70*   BUN  30*  25*  22*     Est CrCl: ~ 30  ml/min;     Temp (24hrs), Av.6 °F (38.1 °C), Min:98.4 °F (36.9 °C), Max:102 °F (38.9 °C)    Cultures: 3/ blood in process    Plan: Change to 750 mg q48 hr per renal dosing protocol

## 2018-03-06 NOTE — CDMP QUERY
To accurately capture the SOI & ROM please clarify if this patient is (was) being treated/managed for: Coma in the setting of Acute right frontoparietal subdural hematoma, AMS, worsening mentation, Lethargy, Obtunded, Marizol Coma Scale 6, posturing, requiring ICU monitoring.    => Other explanation of clinical findings  => Unable to determine (no explanation for clinical findings)    The medical record reflects the following clinical findings, treatment, and risk factors. Risk Factors:  SDH    Clinical Indicators:    H&P  He is very obtunded but slightly agitated due to confusion    Treatment: ICU monitoring     Please clarify and document your clinical opinion in the progress notes and discharge summary including the definitive and/or presumptive diagnosis, (suspected or probable), related to the above clinical findings. Please include clinical findings supporting your diagnosis.     Thank you  Monty Haile  [facility]  [phone]

## 2018-03-06 NOTE — PROGRESS NOTES
Reviewed repeat Ct with large left subdural with mass effect. Discussed further treatment options with family. They have requested comfort care and do not want surgery. I will consult palliative care.

## 2018-03-06 NOTE — H&P
History and Physical    PATIENT ID: Carlos Rodriguez  MRN: [id number]   YOB: 1923    DATE/TIME: 3/5/2018 7:01 PM  PRIMARY CARE PROVIDER: Jasiel Grey MD     CC:   AMS    History of Present Illness:   Carlos Rodriguez is a 80 y.o. male with history that include chronic AFIB not on [address] due to fall risk and NPH with  shunt presents with AMS which has been worsening since a fall on 3/3/18. At that time CT of head was negative, his family noticed a change in mentation which continued to worsen. Today he was extremely confused, combative, and lethargic. He was brought back to the ER where he was this time found to have a right sided subdural hematoma. In addition the son reports that he sustained anoghter GLF shortly upon returning to the facility where he lives at following the ER visit on 3/3/18. Unable to obtain history or ROS due to clinical condition. He is very obtunded but slightly agitated due to confusion. Required ativan IV and then drip in ER to help with Agitation.     Review of Systems:  Unable to obtain ROS    Past Medical History:   Diagnosis Date    Acute confusion 9/29/2012    Atrial fibrillation ([address]     CHRONIC    Borderline diabetes mellitus     CKD (chronic kidney disease)     Diet-controlled type 2 diabetes mellitus ([address]     Dyslipidemia     Hypertension     Hypertensive cardiovascular disease     Meniere disease     Mitral regurgitation     echo 3-27-07    Neurological disorder     dizziness, tilt table test 5-4-07 unremarkable    NPH (normal pressure hydrocephalus)     Shunt Dr Yan Erwin MCV     Orthostatic hypotension     Other ill-defined conditions(799.89)     planter facia,2 achilles    Peripheral neuropathy     Stroke ([address]     4 TIA    TIA (transient ischemic attack)       Past Surgical History:   Procedure Laterality Date    HX APPENDECTOMY      HX CHOLECYSTECTOMY      HX HERNIA REPAIR      HX ORTHOPAEDIC      Left tkr,,rotator cuff  HX TURP       Prior to Admission medications    Medication Sig Start Date End Date Taking? Authorizing Provider   acetaminophen (TYLENOL) 325 mg tablet Take 650 mg by mouth every six (6) hours as needed for Pain. (Do not exceed 3 grams in 24 hours)   Yes Historical Provider   hydrALAZINE (APRESOLINE) 25 mg tablet Take 25 mg by mouth every eight (8) hours as needed. (For systolic BP >396 mmHg)   Yes Historical Provider   digoxin (LANOXIN) 0.125 mg tablet Take 0.125 mg by mouth four (4) days a week. Take QPM on MON, WED, THURS, SAT only. (In addition to AM dose)   Yes Historical Provider   ipratropium (ATROVENT) 0.02 % soln 2.5 mL by Nebulization route every six (6) hours as needed. For wheezing. 2/11/18  Yes Jeanna Hernandez MD   dilTIAZem CD (CARDIZEM CD) 120 mg ER capsule Take 1 Cap by mouth daily. 12/4/17  Yes Jeff Epperson MD   Lactobacillus Acidoph & [facility]) 1 million cell tab tablet Take 2 Tabs by mouth every twelve (12) hours. Yes Alexandru SALMERON MD   aspirin delayed-release 81 mg tablet Take 1 Tab by mouth daily. 12/4/17  Yes Jeff Epperson MD   digoxin (LANOXIN) 0.125 mg tablet Take 0.125 mg by mouth daily. (On MON, WED, THURS, SAT patient takes an additional evening dose)   Yes Historical Provider   DULoxetine (CYMBALTA) 30 mg capsule Take 30 mg by mouth nightly. Yes Historical Provider   levothyroxine (SYNTHROID) 100 mcg tablet Take 100 mcg by mouth Daily (before breakfast). Yes Historical Provider   fish oil-omega-3 fatty acids 340-1,000 mg capsule Take 1 Cap by mouth two (2) times a day. Lunch and Bedtime   Yes Historical Provider   raNITIdine (ZANTAC) 300 mg tablet Take 300 mg by mouth nightly. Yes Historical Provider   simethicone (MYLICON) 80 mg chewable tablet Take 80 mg by mouth three (3) times daily. Indications: FLATULENCE   Yes Historical Provider   traZODone (DESYREL) 100 mg tablet Take 200 mg by mouth nightly.    Yes Historical Provider cholecalciferol, VITAMIN D3, (VITAMIN D3) 5,000 unit tab tablet Take 5,000 Units by mouth daily (with lunch). Yes Historical Provider   clindamycin (CLEOCIN) 300 mg capsule Take 600 mg by mouth once as needed (Prior to dental procedures). Yes Historical Provider   pregabalin (LYRICA) 200 mg capsule Take 400 mg by mouth nightly. Yes Historical Provider   pramipexole (MIRAPEX) 0.125 mg tablet Take 0.125 mg by mouth nightly. Yes Historical Provider   finasteride (PROSCAR) 5 mg tablet Take 5 mg by mouth daily. Yes Janis Price, MD   simvastatin (ZOCOR) 20 mg tablet Take 20 mg by mouth nightly. Yes Historical Provider   tamsulosin (FLOMAX) 0.4 mg capsule Take 0.4 mg by mouth nightly. Yes Historical Provider   omeprazole (PRILOSEC) 20 mg capsule Take 20 mg by mouth Daily (before breakfast). Yes Historical Provider     Allergies   Allergen Reactions    Erythromycin Rash    Penicillins Rash    Tetanus Vaccines And Toxoid Rash      Family History   Problem Relation Age of Onset    Heart Disease Father         SOCIAL HISTORY:  Patient resides at [facility]  Smoking history: never  Alcohol history: occasionally per chart    Objective:     Physical Exam:   Visit Vitals    /82    Pulse 99    Temp 98.4 °F (36.9 °C)    Resp 29    Ht 6' (1.829 m)    Wt 72.9 kg (160 lb 12.8 oz)    SpO2 98%    BMI 21.81 kg/m2     General appearance: uncooperative, combative, mild distress, slowed mentation, appears stated age, confused  Head: Normocephalic, without obvious abnormality, atraumatic  Eyes: Pin poin pupils  Ears: normal TM's and external ear canals AU  Nose: Nares normal. Septum midline. Mucosa normal. No drainage or sinus tenderness. Throat: Can not assess due to mental status  Neck: Appears subtle  Lungs: clear to auscultation bilaterally  Chest wall: Appears normal  Heart: irregularly irregular rhythm, S1, S2 normal  Abdomen: No distension and no tenderness BS present  Extremities: No edema or cyanosis. Bruising left upper arm appears several days old. Ecchymoses knees bilaterally  Pulses: 2+ and symmetric  Skin: Skin color, texture, turgor normal. No rashes or lesions execpt for ecchymoses mentioned    Data Review: All diagnostic labs and studies have been reviewed. CT of Head:  IMPRESSION:  Development of relatively small right frontoparietal acute subdural hematoma with small amount of adjacent subarachnoid hemorrhage since 3/3/2018. Mild mass effect on the underlying parenchyma. No midline shift. Development of minimal left frontotemporal acute subdural hematoma. Assessment:   1. Acute right frontoparietal subdural hematoma as result of head trauma from GLF. 2. Acute encephalopathy secondary to subdural hematoma. 3. NPH with  shunt  4. Chronic AFIB not on  OAC due to fall risk  5. BPH  6. HTN essential with elevated BP POA  7. DNR      Plan:   -  Will admit for further workup and treatment. -  Vitals per protocol  -  Bed rest  -  NPO until bedside evaluation is performed  -  IV Ativan drip to help with agitation  -  Hydralazine IV prn HTN  -  Will hold home meds given current mentation and risk of aspiration  Labs:  CMP, CBC  Consult(s):   -  Neurosurgery:  Per ER physician it appears it will be treated conservatively. -  PCCM for ICU care  -  Speech eval when better but for now NPO  -  PT/OT when better    **Chart reviewed including medications, vitals, notes, labs and pertinent studies.   **Assessment, condition, and plan reviewed and all questions and concerns addressed with family        Signed By: Evy Owens MD     March 5, 2018

## 2018-03-06 NOTE — CDMP QUERY
To accurately capture the SOI & ROM please clarify if this patient is (was) being treated/managed for: Cerebral edema & Brain compression in the setting of Acute right frontoparietal subdural hematoma, Head CT with left cerebral mass effect with 5 mm of rightward midline shift requiring Neurology consult and ICU monitoring.    => Other explanation of clinical findings  => Unable to determine (no explanation for clinical findings)    The medical record reflects the following clinical findings, treatment, and risk factors. Risk Factors:  SDH    Clinical Indicators:  3/6 HEAD CT  IMPRESSION:   1. There is a new predominantly high attenuation holohemispheric right subdural hematoma measuring up to 2.4 cm in thickness. This results in diffuse left cerebral mass effect with 5 mm of rightward midline shift. There is decrease in size of the lateral ventricles. Treatment: Neurosurgery consult, ICU monitoring     Please clarify and document your clinical opinion in the progress notes and discharge summary including the definitive and/or presumptive diagnosis, (suspected or probable), related to the above clinical findings. Please include clinical findings supporting your diagnosis.     Thank you  [facility]  [facility]  [phone]

## 2018-03-06 NOTE — PROGRESS NOTES
Intensivist    81 yo with progressive SDH with shift and herniation  Has been having sz activity  Posturing now    NS has spoken with family with no plans for surgery and plans for comfort measures only given his severe brain injury    Palliative care has been consulted    --will stop abx  --d/c EEG  --palliative care has been consulted  --Pt is a DNR  --comfort care orders     Argentina Blue MD

## 2018-03-06 NOTE — PROGRESS NOTES
2106: Received patient from the ED for routine progression of care. Upon assessment, patient lethargic (received ativan prior to arrival to ICU per report), eyes do not open to pain, spontaneous movement of extremities, pupils brisk round 3/3. No command following. Temp 100.2 axillary will monitor. BP reading 156/112. Atrial fibrillation on the monitor at uncontrolled rate. Patient has history of chronic a fibb. 2117: Nasal cannula 3L placed on patient for O2 sats dropping to 89-91% on room air. O2 increased to 93-96%. Will monitor. 2120: Dr. Ami Snyder paged with the hospitalist regarding BP and uncontrolled heart rate. 2130: Family present at bedside. Updated them on patient's condition. 2140: Dr. Ami Snyder never called back repaged. 2151: Spoke with Dr. Ami Snyder and informed of patient's BP and heart rate. Patient also has no IV fluids ordered. New orders received for normal saline at 75 ml/hr, 10 mg of cardizem ordered IV push followed by cardizem drip to titrate. 2218: Cardizem drip infusing at 5 mg/hr after 10 mg of IV push cardizem given. Will monitor. 2225: Paged Dr. Ami Snyder for elevated temperature of 101.8 axillary. Orders received for prn tylenol suppository and paired blood cultures. 2231: 10 mg of hydralazine given for prn order for elevated BP in the 170s-190's/100s. 2246: BP decreased to 153/87 will monitor. Temp 100 without any tylenol given. Will recheck. 2311: Cardizem drip increased to 10 mg/hr due to no improvement in vitals. 2354: SBP consistently sustained in the 160's-170s. Paged Dr. Ray Lazcano to make her aware. Spoke with Dr. Ray Lazcano. New orders received for cardene drip. 0040: BP improving after starting cardene drip. Will continue to monitor. Patient still lethargic not responding to questions since ativan given in ED. Tremorous activity noted in left arm. Asked Elier Hair neuro NP on floor to see patient. Gee Lemon NP at bedside. Informed her of patient.  Stat CT scan ordered and keppra ordered for possible seizure activity. 0103: Patient taken down for CT scan of head without contrast.     0130: Returned to ICU from CT scan. Patient tolerated procedure well. Received a phone call from radiologist on results of CT scan that are worse from previous. 7192: Paged Dr. Staci He to make her aware. She stated she would look at the scan. 0200: Dr. Staci He called back and will be in to see patient. 0215: Dr. Staci He here to see patient. Patient noted to have abnormal posturing during neuro exam. Pupils brisk 3/3. Right eye intermittently deviates to the left. 0245: Informed by Dr. Staci He that she called son but was unable to reach him. Orders received to have a repeat CT scan at 6 am and reevaluate based on results. 2842: 650 mg of tylenol given via rectum for temp of 101.6 axillary. 8132: Received a phone call from patient's son, Fernie Cuello. Update given. [facility] requested to speak with Dr. Staci He. Dr. Staci He paged. 8172: Dr. Staci He called back and informed her to call [facility] (number given). 0400: Temperature now 102, patient cooled with ice packs under arms and groin. 0515: Patient's son Senia Aviles called unit and update given. Son requesting to speak with Dr. Staci He. Dr. Staci He paged. 0: Spoke with Dr. Staci He and informed her that Senia Aviles   (son) would like to speak with her. Number provided given to Dr. Staci He. 0600: Family at bedside at this time. Update given. 0630: Patient taken down for CT scan of head. 0700: Returned to ICU. Patient tolerated procedure well. VSS. Patient's temp down to 100.2 after cooling with ice bags. VSS with cardizem drip and cardene. Patient remains in atrial fibrillation but heart rate controlled in the 90's. Patient continues to have declining neuro status.

## 2018-03-06 NOTE — PROGRESS NOTES
Neurology Note    Admit Date: 3/5/2018  Length of Stay: 1  Primary Care: Reyes Regulus, MD   Principle Problem: <principal problem not specified>     Assessment:    SDH    Plan:    Repeat CT. Suspect worsening of SDH. Personal review of CT shows worsening left sided SDH with right shift. Nursing paging neurosurgery. Possible seizure activity. Abnormal movements to LUE. Keppra load and then 1,000mg q12hr. Will get EEG. Will need improved BP control. -200/110-120s in ED. Cardene gtt in place. Cardizem gtt for rate control with a-fib. History: Mr. Elvin Avitia is a 79yo  male with PMH of chronic afib (not on AC due to fall risk), NPH with VPS, DM, HTN, hyperlipidemia, and stroke who presented with AMS that has been worsening since fall on 3/3/18. He was brought to the ED with extreme confusion, lethargy, and combativeness. CT showed bilateral SDH with largest on right. Dr. Stephane Fabian from NS saw earlier and felt he was non-surgical at that time. I have been asked to see since his admission to ICU from the ED for worsening LOC and abnormal movements. Results reviewed:     CT Results (most recent): FINDINGS:     INTRACRANIAL HEMORRHAGE:    1. New small right posterior frontal/anterior parietal high attenuation  extra-axial collection measuring a maximum of 9 mm in size with mild mass effect  on the underlying cortex. Small amount of adjacent subarachnoid hemorrhage. 2. Trace of high attenuation extra-axial collection along the surface of the  left inferior frontal and temporal lobes, also new. VENTRICULAR SYSTEM:  Normal for age. Unchanged size. Right frontal  ventriculostomy catheter in region of septum pellucidum, unchanged. BASAL CISTERNS:  Normal.  CEREBRAL PARENCHYMA:  Relatively mild low-attenuation areas, most consistent  with intracranial small vessel disease. MIDLINE SHIFT:  None.   CEREBELLUM:  Normal.  BRAINSTEM: Normal.  CALVARIUM: No fractures. Right frontal may hole. PARANASAL SINUSES AND MASTOID AIR CELLS: Clear. VISUALIZED ORBITS: No significant abnormalities. SELLA: Normal.  SKULL BASE: Intact.     IMPRESSION  IMPRESSION:  Development of relatively small right frontoparietal acute subdural  hematoma with small amount of adjacent subarachnoid hemorrhage since 3/3/2018. .  Mild mass effect on the underlying parenchyma. No midline shift. Development of  minimal left frontotemporal acute subdural hematoma.     MRI Results (most recent):  NA    Latest Hemoglobin A1C:  Lab Results   Component Value Date/Time    Hemoglobin A1c 6.3 02/07/2018 03:51 AM       Latest Cardiology Procedure:  Results for orders placed or performed during the hospital encounter of 03/05/18   EKG, 12 LEAD, INITIAL   Result Value Ref Range    Ventricular Rate 93 BPM    Atrial Rate 43 BPM    QRS Duration 110 ms    Q-T Interval 350 ms    QTC Calculation (Bezet) 435 ms    Calculated R Axis -79 degrees    Calculated T Axis 27 degrees    Diagnosis       Atrial fibrillation  Left axis deviation  Right bundle branch block    When compared with ECG of 03-MAR-2018 18:09,  No significant change  Confirmed by Renan Chao M.D., Melvin Navin ([id number]) on 3/5/2018 5:08:38 PM         Important Labs:  No results found for: FOL, RBCF  Lab Results   Component Value Date/Time    Cholesterol, total 157 02/17/2011 05:22 AM    HDL Cholesterol 60 02/17/2011 05:22 AM    LDL, calculated 68.4 02/17/2011 05:22 AM    VLDL, calculated 28.6 02/17/2011 05:22 AM    Triglyceride 143 02/17/2011 05:22 AM    CHOL/HDL Ratio 2.6 02/17/2011 05:22 AM     Lab Results   Component Value Date/Time    TSH 1.17 03/05/2018 01:11 PM    T4, Free 1.1 03/05/2018 01:11 PM    T4, Total 5.7 03/27/2010 04:05 PM     No results found for: DS35, PHEN, PHENO, PHENT, DILF, DS39, PHENY, PTN, VALF2, VALAC, VALP, VALPR, DS6, CRBAM, CRBAMP, CARB2, XCRBAM  Discussed with: nursing    Allergies:    Allergies   Allergen Reactions    Erythromycin Rash    Penicillins Rash    Tetanus Vaccines And Toxoid Rash      Review of Systems: unable to contribute   Y  N   Constitutional: [] [] recent weight change  [] [] fever  [] [] sleep difficulties   ENT/Mouth:  [] [] hearing loss  [] [] swallowing problems  [] [] slurred speech   Cardiovascular:  [] [] chest pain   [] [] palpitations    Respiratory: [] [] cough with swallow  [] [] shortness of breath  [] [] sleep apnea  [] [] intubated   Gastrointestinal: [] [] abdominal pain  [] [] nausea   Genitourinary: [] [] frequent urination  [] [] incontinence    Musculoskeletal:   [] [] joint pain  [] [] muscle pain   Integument:   [] [] rash/itching   Neurological:  [] [] dizziness/vertigo  [] [] sedation  [] [] confusion  [] [] agitation/combativeness  [] [] loss of consciousness  [] [] numbness/tingling sensation  [] [] tremors  [] [] weakness in limbs  [] [] difficulty with balance  [] [] frequent or recurring headaches  [] [] memory loss   [] [] comatose  [] [] seizures   Psychiatric:   [] [] depression  [] [] hallucinations   Endocrine: [] [] excessive thirst or urination   [] [] heat or cold intolerance   Hematologic/Lymphatic: [] [] bleeding tendency  [] [] enlarged lymph nodes     PMH:   Past Medical History:   Diagnosis Date    Acute confusion 9/29/2012    Atrial fibrillation ([facility]     CHRONIC    Borderline diabetes mellitus     CKD (chronic kidney disease)     Diet-controlled type 2 diabetes mellitus ([facility]     Dyslipidemia     Hypertension     Hypertensive cardiovascular disease     Meniere disease     Mitral regurgitation     echo 3-27-07    Neurological disorder     dizziness, tilt table test 5-4-07 unremarkable    NPH (normal pressure hydrocephalus)     Shunt  Labels That Talk MCV     Orthostatic hypotension     Other ill-defined conditions(149.89)     planter facia,2 achilles    Peripheral neuropathy     Stroke ([facility]     4 TIA    TIA (transient ischemic attack)         Problem List: Active Problems: Subdural hematoma (HCC) (3/5/2018)        FH:   Family History   Problem Relation Age of Onset    Heart Disease Father         SH:   Social History     Social History    Marital status:      Spouse name: N/A    Number of children: N/A    Years of education: N/A     Social History Main Topics    Smoking status: Never Smoker    Smokeless tobacco: Never Used    Alcohol use Yes      Comment: weekly    Drug use: No    Sexual activity: Not Currently     Other Topics Concern    None     Social History Narrative    Lives [place]          Vital Signs:   Visit Vitals    /73    Pulse 100    Temp 98.4 °F (36.9 °C)    Resp 27    Ht 6' (1.829 m)    Wt 69.9 kg (154 lb)    SpO2 96%    BMI 20.89 kg/m2      Neurological examination:    Appearance: appears ill. Restless. Kussmaul breathing.  Cardiovascular: Heart is tachycardic and irregular. Peripheral edema is absent.  Mental status examination: Obtunded. Opens eyes briefly to noxious stimuli. Does not track or focus. Eyes deviated to right, head to right. No attempts at speech. Does not follow commands.  Cranial Nerves:     I: smell Not tested   II: visual fields No blink to visual threat   II: pupils Equal, round, reactive to light   III,VII: ptosis none   III,IV,VI: extraocular muscles  Eyes deviated to right. Unable to overcome with oculocephalic   V: facial light touch sensation     V,VII: corneal reflex     VII: facial muscle function     VIII: hearing    IX: soft palate elevation     IX,X: gag reflex    XI: sternocleidomastoid strength    XII: tongue strength          Motor exam: Station, gait:  deferred. Semipurposeful with LUE. RUE extended. Withdrawals BLE to noxious R>L. Having tremors/twitching to LUE.  Sensory: unable to assess.   Withdrawals to noxious stimulus L>R   Coordination: unable to test   Reflexes: unable to test          Medications:      [] REVIEWED  Current Facility-Administered Medications   Medication    levETIRAcetam (KEPPRA) 1,500 mg in 0.9% sodium chloride 100 mL IVPB    levETIRAcetam (KEPPRA) 1,000 mg in 0.9% sodium chloride 100 mL IVPB    sodium chloride (NS) flush 5-10 mL    sodium chloride (NS) flush 5-10 mL    ondansetron (ZOFRAN) injection 4 mg    haloperidol lactate (HALDOL) injection 2 mg    famotidine (PF) (PEPCID) 20 mg in sodium chloride (NS) 10 mL injection    hydrALAZINE (APRESOLINE) 20 mg/mL injection 10 mg    LORazepam (ATIVAN) 1 mg/mL in D5W infusion    0.9% sodium chloride infusion    dilTIAZem (CARDIZEM) 125 mg in dextrose 5% 125 mL infusion    acetaminophen (TYLENOL) suppository 650 mg    niCARdipine (CARDENE) 25 mg in 0.9% sodium chloride 250 mL infusion     Data:      [] REVIEWED  Recent Results (from the past 24 hour(s))   GLUCOSE, POC    Collection Time: 03/05/18 12:38 PM   Result Value Ref Range    Glucose (POC) 268 (H) 65 - 100 mg/dL    Performed by Sindy Montanez    POC INR    Collection Time: 03/05/18 12:39 PM   Result Value Ref Range    INR (POC) 1.2 (H) <1.2     EKG, 12 LEAD, INITIAL    Collection Time: 03/05/18 12:49 PM   Result Value Ref Range    Ventricular Rate 93 BPM    Atrial Rate 43 BPM    QRS Duration 110 ms    Q-T Interval 350 ms    QTC Calculation (Bezet) 435 ms    Calculated R Axis -79 degrees    Calculated T Axis 27 degrees    Diagnosis       Atrial fibrillation  Left axis deviation  Right bundle branch block    When compared with ECG of 03-MAR-2018 18:09,  No significant change  Confirmed by Alyssa Guzman M.D., [place] ([id number]) on 3/5/2018 5:08:38 PM     CBC WITH AUTOMATED DIFF    Collection Time: 03/05/18  1:11 PM   Result Value Ref Range    WBC 8.2 4.1 - 11.1 K/uL    RBC 4.62 4.10 - 5.70 M/uL    HGB 14.4 12.1 - 17.0 g/dL    HCT 42.5 36.6 - 50.3 %    MCV 92.0 80.0 - 99.0 FL    MCH 31.2 26.0 - 34.0 PG    MCHC 33.9 30.0 - 36.5 g/dL    RDW 14.0 11.5 - 14.5 %    PLATELET 894 510 - 575 K/uL    MPV 12.6 8.9 - 12.9 FL    NRBC 0.0 0  WBC ABSOLUTE NRBC 0.00 0.00 - 0.01 K/uL    NEUTROPHILS 76 (H) 32 - 75 %    LYMPHOCYTES 12 12 - 49 %    MONOCYTES 10 5 - 13 %    EOSINOPHILS 1 0 - 7 %    BASOPHILS 1 0 - 1 %    IMMATURE GRANULOCYTES 0 0.0 - 0.5 %    ABS. NEUTROPHILS 6.2 1.8 - 8.0 K/UL    ABS. LYMPHOCYTES 1.0 0.8 - 3.5 K/UL    ABS. MONOCYTES 0.8 0.0 - 1.0 K/UL    ABS. EOSINOPHILS 0.1 0.0 - 0.4 K/UL    ABS. BASOPHILS 0.1 0.0 - 0.1 K/UL    ABS. IMM. GRANS. 0.0 0.00 - 0.04 K/UL    DF AUTOMATED     METABOLIC PANEL, COMPREHENSIVE    Collection Time: 03/05/18  1:11 PM   Result Value Ref Range    Sodium 141 136 - 145 mmol/L    Potassium 3.8 3.5 - 5.1 mmol/L    Chloride 107 97 - 108 mmol/L    CO2 22 21 - 32 mmol/L    Anion gap 12 5 - 15 mmol/L    Glucose 250 (H) 65 - 100 mg/dL    BUN 25 (H) 6 - 20 MG/DL    Creatinine 1.62 (H) 0.70 - 1.30 MG/DL    BUN/Creatinine ratio 15 12 - 20      GFR est AA 48 (L) >60 ml/min/1.73m2    GFR est non-AA 40 (L) >60 ml/min/1.73m2    Calcium 9.9 8.5 - 10.1 MG/DL    Bilirubin, total 2.5 (H) 0.2 - 1.0 MG/DL    ALT (SGPT) 15 12 - 78 U/L    AST (SGOT) 28 15 - 37 U/L    Alk.  phosphatase 100 45 - 117 U/L    Protein, total 9.0 (H) 6.4 - 8.2 g/dL    Albumin 3.8 3.5 - 5.0 g/dL    Globulin 5.2 (H) 2.0 - 4.0 g/dL    A-G Ratio 0.7 (L) 1.1 - 2.2     TROPONIN I    Collection Time: 03/05/18  1:11 PM   Result Value Ref Range    Troponin-I, Qt. <0.04 <0.05 ng/mL   MAGNESIUM    Collection Time: 03/05/18  1:11 PM   Result Value Ref Range    Magnesium 1.6 1.6 - 2.4 mg/dL   LACTIC ACID    Collection Time: 03/05/18  1:11 PM   Result Value Ref Range    Lactic acid 2.8 (HH) 0.4 - 2.0 MMOL/L   URINALYSIS W/MICROSCOPIC    Collection Time: 03/05/18  1:11 PM   Result Value Ref Range    Color YELLOW/STRAW      Appearance CLEAR CLEAR      Specific gravity 1.021 1.003 - 1.030      pH (UA) 6.5 5.0 - 8.0      Protein 100 (A) NEG mg/dL    Glucose 100 (A) NEG mg/dL    Ketone TRACE (A) NEG mg/dL    Blood TRACE (A) NEG      Urobilinogen 1.0 0.2 - 1.0 EU/dL    Nitrites NEGATIVE  NEG      Leukocyte Esterase NEGATIVE  NEG      WBC 0-4 0 - 4 /hpf    RBC 0-5 0 - 5 /hpf    Epithelial cells FEW FEW /lpf    Bacteria NEGATIVE  NEG /hpf    Hyaline cast 0-2 0 - 5 /lpf   URINE CULTURE HOLD SAMPLE    Collection Time: 03/05/18  1:11 PM   Result Value Ref Range    Urine culture hold        URINE ON HOLD IN MICROBIOLOGY DEPT FOR 3 DAYS. IF UNPRESERVED URINE IS SUBMITTED, IT CANNOT BE USED FOR ADDITIONAL TESTING AFTER 24 HRS, RECOLLECTION WILL BE REQUIRED.    BILIRUBIN, CONFIRM    Collection Time: 03/05/18  1:11 PM   Result Value Ref Range    Bilirubin UA, confirm NEGATIVE  NEG     DIGOXIN    Collection Time: 03/05/18  1:11 PM   Result Value Ref Range    Digoxin level 0.8 (L) 0.9 - 2.0 NG/ML   T4, FREE    Collection Time: 03/05/18  1:11 PM   Result Value Ref Range    T4, Free 1.1 0.8 - 1.5 NG/DL   TSH 3RD GENERATION    Collection Time: 03/05/18  1:11 PM   Result Value Ref Range    TSH 1.17 0.36 - 3.74 uIU/mL   LACTIC ACID    Collection Time: 03/05/18  8:39 PM   Result Value Ref Range    Lactic acid 1.4 0.4 - 2.0 MMOL/L       Chelsea Bates NP

## 2018-03-06 NOTE — PROGRESS NOTES
0800 Bedside and Verbal shift change report given to Jason Napoles RN (oncoming nurse) by Carol Ortiz RN (offgoing nurse). Report included the following information SBAR, Kardex, ED Summary, Procedure Summary, Intake/Output, MAR and Recent Results. 0800 Dr. Ray Lazcano at bedside to discuss plan of care with pt's family, opportunities for questions provided. Palliative consult ordered as family considering proceeding with comfort measures. 3349 Dr. Austyn Hoffman rounding on patient. Orders received. 3412 Palliative group at bedside. Will return when children arrive. 1040 Palliative at bedside for family meeting. 1130 Pt transitioned to full comfort measures. 2000 Bedside and Verbal shift change report given to JOSE RAMON Raya (oncoming nurse) by Jason Napoles RN (offgoing nurse). Report included the following information SBAR, Kardex, ED Summary, Procedure Summary, Intake/Output, MAR and Recent Results.

## 2018-03-06 NOTE — CONSULTS
Palliative Medicine Consult  [place]: [phone] ([phone])    Patient Name: Gilberto Alva  YOB: 1923    Date of Initial Consult: 3/6/18  Reason for Consult:  Care decisions  Requesting Provider: Dr. Inocente Bui  Primary Care Physician: Karyna Tabor MD     SUMMARY:   Gilberto Alva is a 80 y.o. Jaclyn Saas,  since July 2017, retired , with a past history of CKD, HTN, TURP,  shunt for hydrocephalus, afib, who was admitted on 3/5/2018 from \[facility]\"" with a diagnosis of fall/ subdural hematoma. Current medical issues leading to Palliative Medicine involvement include:  Progression of recent subdural bleed, now with posturing, increase R shift. , not a candidate for neurosurgery intervention    Patient has 3 sons - Selma Blanton and one daughter Michele Nicole. Son Zeyad Snowden is medical and durable POA. PALLIATIVE DIAGNOSES:   1. Altered mental status  2. Subdural hematoma, bilateral and subarachnoid hemorrhage, R shift. 3. Prognosis for meaningful recovery poor. Surgery not recommended. 4. Agitation  5. Fever       PLAN:   1. Family Meeting held with mPOA son Senia Aviles and his wife Adarsh Castillo, and also son Alia He. Caprice Ochoa chose to stay at dad's side)  See also Madie Bellamy note. 1. Reviewed recent medical events. Family has good understanding of medical information shared already by other teams. 2. [place] about patient history, family. 3. Family in agreement, if not going to have good meaningful recovery to function, would want focus of care to be comfort. 4. Discussed what comfort care looks like, symptom management, starting that care in ICU and moving to floor bed, expected prognosis (hours to few to several days). , possible referral to hospice for inpatient care. Questions answered about drips, iv antibiotics etc.  5. Family to call with questions. 2. Initial consult note routed to primary continuity provider  3.  Communicated plan of care with: Palliative IDT, neurosurgery, bedside nurse. GOALS OF CARE / TREATMENT PREFERENCES:     GOALS OF CARE:  Patient/Health Care Proxy Stated Goals: Comfort      TREATMENT PREFERENCES:   Code Status: DNR    Advance Care Planning:  Advance Care Planning 3/7/2018   Patient's Healthcare Decision Maker is: Legal Next of Ayo Brito   Primary Decision Maker Name Cori Jeffrey   Primary Decision Maker Phone Number [phone]   Primary Decision Maker Relationship to Patient Adult child   Confirm Advance Directive Yes, on file   Does the patient have other document types Do Not Resuscitate; Power of        Medical Interventions: Comfort measures   Other Instructions:   Artificially Administered Nutrition: No feeding tube     Other:    As far as possible, the palliative care team has discussed with patient / health care proxy about goals of care / treatment preferences for patient.            HISTORY:     History obtained from: chart, family    CHIEF COMPLAINT: admitted after fall at home    HPI/SUBJECTIVE:    The patient is:   [] Verbal and participatory  [x] Non-participatory due to:     80year old male with several falls 3/3/18, seen in ER, head CT negative,  [place] some more at home and returned with AMS, confusion lethargy, now CT showing small subdurals bilaterally    Clinical Pain Assessment (nonverbal scale for severity on nonverbal patients):   Clinical Pain Assessment  Severity: 0     Activity (Movement): Lying quietly, normal position    Duration: for how long has pt been experiencing pain (e.g., 2 days, 1 month, years)  Frequency: how often pain is an issue (e.g., several times per day, once every few days, constant)     FUNCTIONAL ASSESSMENT:     Palliative Performance Scale (PPS):  PPS: 10       PSYCHOSOCIAL/SPIRITUAL SCREENING:     Palliative IDT has assessed this patient for cultural preferences / practices and a referral made as appropriate to needs (Cultural Services, Patient Advocacy, Ethics, etc.)    Advance Care Planning:  Advance Care Planning 3/7/2018   Patient's Healthcare Decision Maker is: Legal Next of Ayo Brito   Primary Decision Maker Name Fawad Palm   Primary Decision Maker Phone Number [phone]   Primary Decision Maker Relationship to Patient Adult child   Confirm Advance Directive Yes, on file   Does the patient have other document types Do Not Resuscitate; Power of        Any spiritual / Spiritism concerns:  [] Yes /  [x] No    Caregiver Burnout:  [] Yes /  [x] No /  [] No Caregiver Present      Anticipatory grief assessment:   [x] Normal  / [] Maladaptive       ESAS Anxiety:      ESAS Depression:          REVIEW OF SYSTEMS:     Positive and pertinent negative findings in ROS are noted above in HPI. The following systems were [] reviewed / [x] unable to be reviewed as noted in HPI  Other findings are noted below. Systems: constitutional, ears/nose/mouth/throat, respiratory, gastrointestinal, genitourinary, musculoskeletal, integumentary, neurologic, psychiatric, endocrine. Positive findings noted below. Modified ESAS Completed by: provider           Pain: 0           Dyspnea: 0                    PHYSICAL EXAM:     From RN flowsheet:  Wt Readings from Last 3 Encounters:   03/07/18 70.3 kg (155 lb)   03/03/18 75.3 kg (166 lb)   02/10/18 72 kg (158 lb 11.7 oz)     Blood pressure 157/81, pulse 93, temperature 100.1 °F (37.8 °C), resp. rate 22, height 6' (1.829 m), weight 70.3 kg (155 lb), SpO2 94 %.     Pain Scale 1: Adult Nonverbal Pain Scale  Pain Intensity 1: 0              Pain Intervention(s) 1: Medication (see MAR)  Last bowel movement, if known:     Constitutional:  Elderly male  Not moving spontaneously during exam  Shallow, rapid breathing  Not awake, not following commands     HISTORY:     Active Problems:    Subdural hematoma ([address] (3/5/2018)      Past Medical History:   Diagnosis Date    Acute confusion 9/29/2012    Atrial fibrillation ([address]     CHRONIC    Borderline diabetes mellitus     CKD (chronic kidney disease)     Diet-controlled type 2 diabetes mellitus ([facility]     Dyslipidemia     Hypertension     Hypertensive cardiovascular disease     Meniere disease     Mitral regurgitation     echo 3-27-07    Neurological disorder     dizziness, tilt table test 5-4-07 unremarkable    NPH (normal pressure hydrocephalus)     Shunt Dr Alejandro Villegas MCV     Orthostatic hypotension     Other ill-defined conditions(799.89)     planter facia,2 achilles    Peripheral neuropathy     Stroke ([facility]     4 TIA    TIA (transient ischemic attack)       Past Surgical History:   Procedure Laterality Date    HX APPENDECTOMY      HX CHOLECYSTECTOMY      HX HERNIA REPAIR      HX ORTHOPAEDIC      Left tkr,,rotator cuff    HX TURP        Family History   Problem Relation Age of Onset    Heart Disease Father       History reviewed, no pertinent family history.   Social History   Substance Use Topics    Smoking status: Never Smoker    Smokeless tobacco: Never Used    Alcohol use Yes      Comment: weekly     Allergies   Allergen Reactions    Erythromycin Rash    Penicillins Rash    Tetanus Vaccines And Toxoid Rash      Current Facility-Administered Medications   Medication Dose Route Frequency    levETIRAcetam (KEPPRA) 1,000 mg in 0.9% sodium chloride 100 mL IVPB  1,000 mg IntraVENous Q12H    haloperidol (HALDOL) 2 mg/mL oral solution 2 mg  2 mg SubLINGual Q6H PRN    Or    haloperidol lactate (HALDOL) injection 2 mg  2 mg IntraVENous Q6H PRN    LORazepam (ATIVAN) injection 1 mg  1 mg IntraVENous Q15MIN PRN    glycopyrrolate (ROBINUL) injection 0.2 mg  0.2 mg IntraVENous Q4H PRN    ketorolac (TORADOL) injection 30 mg  30 mg IntraVENous Q8H PRN    HYDROmorphone (PF) (DILAUDID) injection 0.5 mg  0.5 mg IntraVENous Q15MIN PRN    LORazepam (ATIVAN) injection 1 mg  1 mg IntraVENous QHS    sodium chloride (NS) flush 5-10 mL  5-10 mL IntraVENous Q8H    sodium chloride (NS) flush 5-10 mL  5-10 mL IntraVENous PRN    ondansetron (ZOFRAN) injection 4 mg  4 mg IntraVENous Q4H PRN    haloperidol lactate (HALDOL) injection 2 mg  2 mg IntraVENous Q4H PRN    acetaminophen (TYLENOL) suppository 650 mg  650 mg Rectal Q6H PRN          LAB AND IMAGING FINDINGS:     Lab Results   Component Value Date/Time    WBC 10.8 03/06/2018 04:48 AM    HGB 13.9 03/06/2018 04:48 AM    PLATELET 519 56/43/0791 04:48 AM     Lab Results   Component Value Date/Time    Sodium 144 03/06/2018 04:48 AM    Potassium 3.8 03/06/2018 04:48 AM    Chloride 112 (H) 03/06/2018 04:48 AM    CO2 22 03/06/2018 04:48 AM    BUN 30 (H) 03/06/2018 04:48 AM    Creatinine 1.59 (H) 03/06/2018 04:48 AM    Calcium 9.2 03/06/2018 04:48 AM    Magnesium 1.6 03/05/2018 01:11 PM    Phosphorus 2.4 (L) 09/30/2012 04:45 AM      Lab Results   Component Value Date/Time    AST (SGOT) 28 03/05/2018 01:11 PM    Alk. phosphatase 100 03/05/2018 01:11 PM    Protein, total 9.0 (H) 03/05/2018 01:11 PM    Albumin 3.8 03/05/2018 01:11 PM    Globulin 5.2 (H) 03/05/2018 01:11 PM     (H) 03/27/2010 04:05 PM     Lab Results   Component Value Date/Time    INR 1.2 (H) 09/30/2012 04:45 AM    INR (POC) 1.2 (H) 03/05/2018 12:39 PM    Prothrombin time 12.5 (H) 09/30/2012 04:45 AM    aPTT 31.1 09/30/2012 04:45 AM      No results found for: IRON, FE, TIBC, IBCT, PSAT, FERR   No results found for: PH, PCO2, PO2  No components found for: [place]   Lab Results   Component Value Date/Time     (H) 11/30/2017 03:37 AM    CK - MB 2.2 10/24/2013 04:39 PM                Total time: 70min  Counseling / coordination time, spent as noted above: 45  > 50% counseling / coordination?: yes    Prolonged service was provided for  []30 min   []75 min in face to face time in the presence of the patient, spent as noted above. Time Start:   Time End:   Note: this can only be billed with 02654 (initial) or 83092 (follow up). If multiple start / stop times, list each separately.

## 2018-03-06 NOTE — PROGRESS NOTES
TRANSFER - IN REPORT:    Verbal report received from Calin Navarro (name) on Nicanor Jane  being received from ED(unit) for routine progression of care      Report consisted of patients Situation, Background, Assessment and   Recommendations(SBAR). Information from the following report(s) SBAR, Kardex, Intake/Output, MAR, Recent Results, Cardiac Rhythm Sinus Tach and Alarm Parameters  was reviewed with the receiving nurse. Opportunity for questions and clarification was provided. Assessment completed upon patients arrival to unit and care assumed.

## 2018-03-06 NOTE — CONSULTS
Full consult to follow. Briefly, 89yo with VPS for NPH. Presents with AMS and CT shows small bilateral SDH, right greater than left. No neurosurgical intervention required at this time. Agree with repeat CT in am and BP control. Thank you for this consultation.

## 2018-03-06 NOTE — PROGRESS NOTES
Renal Dosing/Monitoring  Medication: Famotidine   Current regimen:  20 mg IV every 12 hr  Recent Labs      03/06/18   0448  03/05/18   1311  03/03/18   1810   CREA  1.59*  1.62*  1.70*   BUN  30*  25*  22*     Estimated CrCl:  ~25-30 ml/min  Plan: Change to 20 mg IV every 24 hours per [facility] P&T Committee Protocol with respect to renal function. Pharmacy will continue to monitor patient daily and will make dosage adjustments based upon changing renal function.

## 2018-03-06 NOTE — PROGRESS NOTES
Called to review repeat CT. Patient had  previously been agitated but now has become obtunded. Stat head CT revealed increase in size of left SDH with compression of ventricles. On exam he is unresponsive with reactive pupils and abnormal posturing. I turned his shunt valve (which appears from xrays to be a Codman programmable valve) to \"200\", essentially turning it off. In addition, he has received keppra in the event that some of his decreased mental status is related to seizures. I attempted to contact his son and left a message. At this time, we will continue with aggressive BP control and conservative treatment.   We will repeat CT at 6am.

## 2018-03-06 NOTE — ROUTINE PROCESS
Bedside and Verbal shift change report given to Keli Vasquez (oncoming nurse) by Mercedes Lang (offgoing nurse). Report included the following information SBAR, Kardex, Intake/Output, Accordion, Recent Results, Cardiac Rhythm atrial fibrillation and Alarm Parameters .

## 2018-03-06 NOTE — PALLIATIVE CARE
Palliative Medicine Social Work  ADDENDUM:  1120  Dr. Sanket Vicente and I met with patient's family which included two sons, Jhonatan Cristina and Asaf Moe; daughter-in-law, Cali Pinzon. Daughter, Jesse Connelly remained at bedside with patient during our meeting. Patient has another son, Thomas Beauchamp, who visited earlier this morning. Patient has completed an AMD which appoints, Asaf Moe, as [facility]. Family related their understanding that he was neurologically devastated from SDH and that surgery was not an option. They all state a desire to focus on comfort. They, however, had some questions about his ability to recover; state they were informed of a slim chance; wondered if it might be beneficial to consider course of antibiotics. Talked more about patient as a person; wishes outlined in AMD; his benchmark for quality of life. Family confident that he would not want his life prolonged in any way if he could not recover to baseline (walking, talking, knowing his family). Clarified for family that recovery to this benchmark was not possible; that any recovery would be long and arduous and complicated with ongoing risks. Family appreciative of this clarification which makes the decision for comfort easier. Talked more about what shift in focus to comfort would look like; discontinuing things that have no influence on his comfort. Informed care would begin in ICU; transfer to another floor if stable; request hospice to evaluate for GIP. Prepared that prognosis could be hours to days. Assured nothing would be done to hasten death; that we would only treat symptoms witnessed; will provide medications to prevent seizures. Addressed questions they had about importance of presence 24/7. Talked about the phenomenon of control/final gifts around time of death and who is present.   Discussed the importance of balancing what it important for family/patient; benefit of anticipating each time they see him may be the last, taking advantage of saying important things for no regret; giving permission. Patient is described as stoic, sarcastic and not emotionally engaging. He is a retired  in [employer]; [address]. He lost his wife to cancer last July. She was a volunteer here at [address] [place] [address]. Family appears to be coping adaptively with good sense of humor. Will continue to support. Dr. Austen Blanchard and I checked in on patient and family this morning. Daughter-in-law, [employer]; and  from [facility]) were at bedside. Agreed to return with an hour or so when son ,Jeffery Juárez; and two of patient's siblings present. Contact information left for family. Thank you for the opportunity to be involved in the care of Mr. Qamar Church and his family. Shikha Sahni, JAYANT, [facility]  Palliative Medicine   Respecting Choices ® ACP Facilitator   [phone]

## 2018-03-06 NOTE — PROGRESS NOTES
SPEECH THERAPY SCREENING:  SERVICES ARE NOT INDICATED AT THIS TIME    [facility] for speech therapy based on results obtained during the nursing admission assessment. The patients chart was reviewed and the patient is not appropriate for a skilled therapy evaluation at this time. Please consult speech therapy if any therapy needs arise. Thank you.     Kim Bradford, SLP

## 2018-03-07 NOTE — PROGRESS NOTES
Hospitalist Progress Note  Etienne Lal MD  Answering service: [phone] OR [phone] from in house phone         Date of Service:  3/7/2018  NAME:  Hunter Shane  :  1923  MRN:  [id number]      Admission Summary:     History of Present Illness:   Hunter Shane is a 80 y.o. male with history that include chronic AFIB not on [address] due to fall risk and NPH with  shunt presents with AMS which has been worsening since a fall on 3/3/18. At that time CT of head was negative, his family noticed a change in mentation which continued to worsen. Today he was extremely confused, combative, and lethargic. He was brought back to the ER where he was this time found to have a right sided subdural hematoma. In addition the son reports that he sustained anoghter GLF shortly upon returning to the facility where he lives at following the ER visit on 3/3/18. Unable to obtain history or ROS due to clinical condition. He is very obtunded but slightly agitated due to confusion. Required ativan IV and then drip in ER to help with Agitation.         Interval history / Subjective:       3/6:  Spent time with son in pt's room, confimed: plan is for comfort care, seems best to go hospice     3/7:  Again spent time with pt and son/family , recommended Hospice, , on this bases, Hospice to see for possible intake soon. , seems tomorrow for family meeting        Assessment & Plan:       1. Acute encephalopathy secondary to subdural hematoma. 2. Coma in the setting of Acute right frontoparietal subdural hematoma, AMS, worsening mentation, Lethargy, Obtunded, Marizol Coma Scale 6, posturing, requiring ICU monitoring. 3.  Cerebral edema & Brain compression in the setting of Acute right frontoparietal subdural hematoma, Head CT with left cerebral mass effect with 5 mm of  rightward midline shift requiring Neurology consult and ICU monitoring  4.  NPH with  shunt  5. Chronic AFIB not on  OAC due to fall risk  6. BPH  7. HTN essential with elevated BP POA  8. DNR          Code status: DNR  DVT prophylaxis: scd's    Care Plan discussed with: Patient/Family and Nurse  Disposition: Hospice  and TBD     Hospital Problems  Date Reviewed: 2/5/2018          Codes Class Noted POA    Subdural hematoma ([facility] [address] ICD-10-CM: I62.00  ICD-9-CM: 432.1  3/5/2018 Unknown                Review of Systems:   Review of systems not obtained due to patient factors. Vital Signs:    Last 24hrs VS reviewed since prior progress note. Most recent are:  Visit Vitals    /81 (BP 1 Location: Left arm, BP Patient Position: At rest;Lying left side)    Pulse 93    Temp 100.1 °F (37.8 °C)    Resp 22    Ht 6' (1.829 m)    Wt 70.3 kg (155 lb)    SpO2 94%    BMI 21.02 kg/m2         Intake/Output Summary (Last 24 hours) at 03/07/18 1344  Last data filed at 03/07/18 0800   Gross per 24 hour   Intake              200 ml   Output              660 ml   Net             -460 ml        Physical Examination:             Constitutional:  pre terminal    Pu;L clear  CV: rr                             Neurologic:  wd non verbal                  Labs:     Recent Labs      03/06/18   0448  03/05/18   1311   WBC  10.8  8.2   HGB  13.9  14.4   HCT  42.0  42.5   PLT  155  157     Recent Labs      03/06/18   0448  03/05/18   1311   NA  144  141   K  3.8  3.8   CL  112*  107   CO2  22  22   BUN  30*  25*   CREA  1.59*  1.62*   GLU  279*  250*   CA  9.2  9.9   MG   --   1.6     Recent Labs      03/05/18   1311   SGOT  28   ALT  15   AP  100   TBILI  2.5*   TP  9.0*   ALB  3.8   GLOB  5.2*     Recent Labs      03/05/18   1239   INR  1.2*      No results for input(s): FE, TIBC, PSAT, FERR in the last 72 hours. No results found for: FOL, RBCF   No results for input(s): PH, PCO2, PO2 in the last 72 hours.   Recent Labs      03/05/18   1311   TROIQ  <0.04     Lab Results   Component Value Date/Time    Cholesterol, total 157 02/17/2011 05:22 AM    HDL Cholesterol 60 02/17/2011 05:22 AM    LDL, calculated 68.4 02/17/2011 05:22 AM    Triglyceride 143 02/17/2011 05:22 AM    CHOL/HDL Ratio 2.6 02/17/2011 05:22 AM     Lab Results   Component Value Date/Time    Glucose (POC) 268 (H) 03/05/2018 12:38 PM    Glucose (POC) 181 (H) 03/03/2018 06:07 PM    Glucose (POC) 219 (H) 02/11/2018 11:36 AM    Glucose (POC) 113 (H) 02/11/2018 06:13 AM    Glucose (POC) 219 (H) 02/10/2018 09:18 PM     Lab Results   Component Value Date/Time    Color YELLOW/STRAW 03/05/2018 01:11 PM    Appearance CLEAR 03/05/2018 01:11 PM    Specific gravity 1.021 03/05/2018 01:11 PM    Specific gravity >1.030 (H) 04/04/2010 04:00 PM    pH (UA) 6.5 03/05/2018 01:11 PM    Protein 100 (A) 03/05/2018 01:11 PM    Glucose 100 (A) 03/05/2018 01:11 PM    Ketone TRACE (A) 03/05/2018 01:11 PM    Bilirubin NEGATIVE  03/03/2018 10:08 PM    Urobilinogen 1.0 03/05/2018 01:11 PM    Nitrites NEGATIVE  03/05/2018 01:11 PM    Leukocyte Esterase NEGATIVE  03/05/2018 01:11 PM    Epithelial cells FEW 03/05/2018 01:11 PM    Bacteria NEGATIVE  03/05/2018 01:11 PM    WBC 0-4 03/05/2018 01:11 PM    RBC 0-5 03/05/2018 01:11 PM         Medications Reviewed:     Current Facility-Administered Medications   Medication Dose Route Frequency    levETIRAcetam (KEPPRA) 1,000 mg in 0.9% sodium chloride 100 mL IVPB  1,000 mg IntraVENous Q12H    haloperidol (HALDOL) 2 mg/mL oral solution 2 mg  2 mg SubLINGual Q6H PRN    Or    haloperidol lactate (HALDOL) injection 2 mg  2 mg IntraVENous Q6H PRN    LORazepam (ATIVAN) injection 1 mg  1 mg IntraVENous Q15MIN PRN    glycopyrrolate (ROBINUL) injection 0.2 mg  0.2 mg IntraVENous Q4H PRN    ketorolac (TORADOL) injection 30 mg  30 mg IntraVENous Q8H PRN    HYDROmorphone (PF) (DILAUDID) injection 0.5 mg  0.5 mg IntraVENous Q15MIN PRN    LORazepam (ATIVAN) injection 1 mg  1 mg IntraVENous QHS    sodium chloride (NS) flush 5-10 mL  5-10 mL IntraVENous Q8H  sodium chloride (NS) flush 5-10 mL  5-10 mL IntraVENous PRN    ondansetron (ZOFRAN) injection 4 mg  4 mg IntraVENous Q4H PRN    haloperidol lactate (HALDOL) injection 2 mg  2 mg IntraVENous Q4H PRN    acetaminophen (TYLENOL) suppository 650 mg  650 mg Rectal Q6H PRN     ______________________________________________________________________  EXPECTED LENGTH OF STAY: 4d 19h  ACTUAL LENGTH OF STAY:          2                 Laverta Romberg, MD

## 2018-03-07 NOTE — PROGRESS NOTES
1930:Bedside and Verbal shift change report given to Julia Martinez RN (oncoming nurse) by Darleen Carroll RN (offgoing nurse). Report included the following information SBAR, Kardex, Intake/Output, MAR, Accordion, Recent Results, Med Rec Status and Cardiac Rhythm A-fib.     0000: Temperature 101.5, PRN tylenol suppository given per order. -125 PRN dilaudid given per order. Pt repositioned in the bed. Family leaving for the night.

## 2018-03-07 NOTE — HOSPICE
[facility]  [phone]    Inpatient Nursing Admission   Patient Name: Nicanor Jane  YOB: 1923  Age: 80 y.o. Date of Hospice Admission: 3/7/2018  Hospice Attending Elected by Patient: Gale Fabian MD  Primary Care Physician: Vance Ambriz MD  Admitting RN: Lieutenant Alfreda ALBRIGHT  : Rosemarie Gaines     Level of Care (GIP/Routine/Respite): GIP  Facility of Care: [facility]  Patient Room: 605/01     HOSPICE SUMMARY   ER Visits/ Hospitalizations in past year: 4 ED/ 3 ADM  Hospice Diagnosis: SDH  Onset Date of Hospice Diagnosis: 3/5/18  Summary of Disease Progression Leading to Hospice Diagnosis: pt had an unwitnessed GLF at assisted living facility causing SDH. Hemorrhage persisted with R midline shift leaving pt unresponsive with severe agitation and pain. With respect for pt's expressed wishes, surgery not considered an option and decision made by collective family members including mpoa to admit to hospice for symptom management and end of life care. 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 Heart Failure I11.9    Peripheral neuropathy G62.9    CKD (chronic kidney disease) N18.9    Dyslipidemia E78.5    Neurological disorder G98.8    Mitral regurgitation I34.0    Right knee DJD M17.11    Pneumonia J18.9    NPH (normal pressure hydrocephalus) G91.2    Recurrent falls R29.6    Fever R50.9    Influenza A J10.1    Subdural hematoma (HCC) I62.00     Diagnoses RELATED to the terminal prognosis: Subdural hematoma  Other Diagnoses: CKD    Rationale for a prognosis of life expectancy of 6 months or less if the disease follows its normal course (Disease Specific History):     Nicanor Jane is a 80 y. o. who was admitted to [facility]. The patient's principle diagnosis of SDH has resulted in unresponsiveness and severe agtiation. Functionally, the patient's Palliative Performance Scale has declined over a period of days and is estimated at 10%. Objective information that support this patients limited prognosis includes: Cr 1.59  CT 3/6/18  IMPRESSION:   Overall stable bilateral subdural hematomas, left larger than right. Stable  right subarachnoid hemorrhage. Increased rightward midline shift. Mildly  increased size of the lateral ventricles. No new acute intracranial hemorrhage.     .  The patient/family chose comfort measures with the support of Hospice. Patient meets for GIP LOC as evidenced by non verbal signs of pain and agitation unrelieved by PRN doses of dilaudid and ativan. Prognosis estimated based on 03/07/18 clinical assessment is:   [] Few to Many Hours  [x] Hours to Days   [] Few to Many Days   [] Days to Weeks   [] Few to Many Weeks   [] Weeks to Months   [] Few to Many Months    ASSESSMENT    Patient self-reports:  []  Yes    [x] No    SYMPTOMS: pain, agiation    SIGNS/PHYSICAL FINDINGS: pt is unresponsive, incontinent requiring condom cath, IV patent, questionable seizure activity AEB tremors with movement and agitation. KARNOFSKY: 10%    FAST for all dementia:      Learning Assessment:  Patient  N/A pt is unresponsive  Is patient willing/able to learn? What is the highest level of education completed? Learning preference (written material, demonstration, visual)? Learning barriers (ESOL, [place], poor vision)? Caregiver  Fracisco Yin  Is caregiver willing to learn care for patient? yes  What is the highest level of education completed?  college  Learning preference (written material, demonstration, visual)? any  Learning barriers (ESOL, [place], poor vision)?   Non expressed    CLINICAL INFORMATION     Wt Readings from Last 3 Encounters:   03/07/18 70.3 kg (155 lb)   03/03/18 75.3 kg (166 lb) 02/10/18 72 kg (158 lb 11.7 oz)     Ht Readings from Last 3 Encounters:   03/05/18 6' (1.829 m)   02/05/18 6' (1.829 m)   11/29/17 6' 1\" (1.854 m)     There is no height or weight on file to calculate BMI. There were no vitals taken for this visit. LAB VALUES  No results found for this visit on 03/07/18 (from the past 12 hour(s)). No results found for this visit on 03/07/18 (from the past 6 hour(s)). Lab Results   Component Value Date/Time    Protein, total 9.0 (H) 03/05/2018 01:11 PM    Albumin 3.8 03/05/2018 01:11 PM       Currently this patient has:  [] Supplemental O2 [x] Peripheral IV  [] PICC    [] PORT   [] Schmidt Catheter [] NG Tube   [] PEG Tube [] Ostomy    [] AICD: Has ICD been deactivated? [] Yes [] No:______    PLAN     1. Admit GIP level of care for pain and agitation  2. Pain:  Scheduled dilaudid 0.5mg IV every 4 hrs with PRN availability  3. Aggitaiton:  1mg IV ativan every 15min PRN  4. Seizures:  IV keppra 1000mg IV every 12 hrs. PRN ativan every 15min for seizure activity. 5.  Emotional and spiritual support from hospice team  6. Discharge to [facility] should pt stabilize. Also discussed option of [facility] as alternative. Hospice Team Frequency Orders:  Skilled Nurse -   Daily x 7 days /every other day x 7 days  with 5 PRN visits for symptom control. MSW  1 visit for initial assessment/evaluation for family support and need for volunteer services. Neel Arora  1 visit for initial assessment/evaluation for spiritual support.      ADVANCE CARE PLANNING (Complete in ACP Flow Sheet)   Code Status: DNR  Durable DNR: [x]  Yes  []  No  Code Status Discussed/Confirmed: confirmed desire for no life prolonging measures  Preference for Other Life Sustaining Treatment Discussed/Confirmed:  No life prolonging measures desired  Hospitalization Preference: family prefers pt to remain at [address] [place] [address] as pt is too unstable to transport  Advance Care Planning 3/7/2018   Patient's Healthcare Decision Maker is: Legal Next of Kin   Primary Decision Maker Name Fawad Palm   Primary Decision Maker Phone Number [phone]   Primary Decision Maker Relationship to Patient Adult child   Confirm Advance Directive Yes, on file   Does the patient have other document types Do Not Resuscitate; Power of         Service: [x] Yes  []  No      [] Unknown  WW[employer] vet  Appropriate for Pinning Ceremony:  [] Yes     [x] No  Mandaen: PRESBYTERIAN   Home: cremation. Deciding between [facility] or [facility] and [address] [place] [address]     1. Discharge Plan: Discharge to [facility] should pt stabilize. Also discussed option of [facility] as alternative. 2. Patient/Family teaching: end of live process, use of artificial nutrition and hydration  3. Response to patient/family teaching: expressed understanding    SOCIAL/EMOTIONAL/SPIRITUAL NEEDS     Spiritual Issues Identified: to be assessed by hospice chaplain    Psych/ Social/ Emotional Issues Identified: 4 children, 3 present at time of admission. Grateful for pt's advanced directive and previous conversations with pt about desire for no extraordinary measures at end of life. Pt's wife dies a few months ago. Family adjusting to loss of both parents in a short period of time. Caregiver Support:  [x] Provided information on End of Life Care   [] Material Provided: Gone From My Sight or Concha Leap   Dr. Dr Zulema García contacted, discharge to hospice order received  Dr. Sienna Ferrer contacted, agrees to serve as attending provider for hospice and provided verbal certification of terminal illness with life expectancy of 6 months or less. Orders for hospice admission, medications and plan of treatment received. Medication reconciliation completed.   MEDS: See medication list below  DME: Per hospital  Supplies: Per hospital  IDT communication to include MD, SN, SW, CH and support team    ALLERGIES AND MEDICATIONS     Allergies:    Allergies   Allergen Reactions    Erythromycin Rash    Penicillins Rash    Tetanus Vaccines And Toxoid Rash     Current Facility-Administered Medications   Medication Dose Route Frequency    [START ON 3/8/2018] levETIRAcetam (KEPPRA) 1,000 mg in 0.9% sodium chloride 100 mL IVPB  1,000 mg IntraVENous Q12H    LORazepam (ATIVAN) injection 1 mg  1 mg IntraVENous Q15MIN PRN    acetaminophen (TYLENOL) suppository 650 mg  650 mg Rectal Q4H PRN    ketorolac (TORADOL) injection 30 mg  30 mg IntraVENous Q8H PRN    scopolamine (TRANSDERM-SCOP) 1 mg over 3 days 1 Patch  1 Patch TransDERmal Q72H    bisacodyl (DULCOLAX) suppository 10 mg  10 mg Rectal DAILY PRN    HYDROmorphone (PF) (DILAUDID) injection 0.5 mg  0.5 mg IntraVENous Q15MIN PRN    HYDROmorphone (PF) (DILAUDID) injection 0.5 mg  0.5 mg IntraVENous Q4H    saline peripheral flush soln 5 mL  5 mL InterCATHeter PRN

## 2018-03-07 NOTE — PROGRESS NOTES
Bedside shift change report given to jermaine (oncoming nurse) by Brain Riley (offgoing nurse). Report included the following information SBAR, Kardex, MAR and Recent Results.

## 2018-03-07 NOTE — PROGRESS NOTES
11:26 AM  Chart Reviewed. CM received a Hospice Consult for End of Life Care. Per Palliative notes possible that patient will pass here in hospital in next hours to days. If stable for some time , hospice may work to transition him back to [facility] for EOL care. [facility]. CM currently awaiting receipt of referral.      CM contacted Regis WILCOX with [employer] [facility] [phone] to provide updates. No one was present at the time of the call. CM will continue to follow and assist with disposition needs as they arise. 3:03 PM  CM received notification that patient will be admitted into Inpatient Hospice. There are no other CM needs at this time. CM provided updates to [address] [place] [address]. Facility requesting to be notified when patient transitions.   CM will notify RN of request.    Екатерина Mehta, NEVA/CRM

## 2018-03-07 NOTE — HOSPICE
[facility] RN note:  Met with sons Alysa Mas ([employer] [facility] [employer]), Lauren Bean and Anuja Mcelroy Reviewed events leading to hospitalization and decision to not surgically intervene with SDH. Discussed hospice philosophy and services as they relate to inpt hospice. Reportedly pt was very clear with advanced directive, DNR and no extraordinary measures. Pt's wife  last summer and had a brother die of SDH at which time pt made decisions to allow natural death. Family expressed with no ambivalence desire for comfort care through end of life. Pt has required 7 PRN doses of IV dilaudid (about every 2-5 hrs) with incomplete relief, facial grimacing with movement. Family describe extreme agitation, pulling at clothes and holding head in what they believe to be pain. 1 PRN dose of ativan given with reported good relief, \"we finally could relax once he was comfortable. \"  Approval for inpt hospice at [facility] level of care per Dr Tim Stafford. Consent forms for hospice to be signed by son Alysa Mas with  [employer]. Thank you for the opportunity to care for this pt and family. Please contact hospice at [phone] with any questions or concerns.

## 2018-03-07 NOTE — HOSPICE
[facility] Help to Those in Need  [phone]    Patient Name: Carlos Rodriguez  YOB: 1923  Age: 80 y.o. 190 [facility] RN Note:     Call to son Archana Shah message left requesting return call to schedule hospice information meeting. Thank you for the opportunity to be of service to this patient.     April Ricks RN

## 2018-03-07 NOTE — CONSULTS
Palliative Medicine Consult  Riley: [phone] ([phone])    Patient Name: Jordana Enriquez  YOB: 1923    Date of Initial Consult: 3/6/18  Reason for Consult:  Care decisions  Requesting Provider: Dr. Caridad Whitten  Primary Care Physician: Toya Segundo MD     SUMMARY:   Jordana Enriquez is a 80 y.o. Jonne Arn,  since July 2017, retired , with a past history of CKD, HTN, TURP,  shunt for hydrocephalus, afib, who was admitted on 3/5/2018 from [facility] with a diagnosis of fall/ subdural hematoma. Current medical issues leading to Palliative Medicine involvement include:  Progression of recent subdural bleed, now with posturing, increase R shift. , not a candidate for neurosurgery intervention    Patient has 3 sons - Niko Carlton and one daughter Analilia Spine. Son Caio Perkins is medical and durable POA. PALLIATIVE DIAGNOSES:   1. Altered mental status  2. Subdural hematoma, bilateral and subarachnoid hemorrhage, R shift. 3. Prognosis for meaningful recovery poor. Surgery not recommended. 4. Agitation  5. Fever       PLAN:   1. Symptoms managed overnight with PRN medications. 2. Family rotating bedside hobson  3. Met with MICHELLE Cadena  1. Patient's grandchildren visiting later today. 2. Answered questions about helping her children (6th grader/ 10th grader) say goodbye to patient. (one son has not wanted to visit -- we discussed option of having him write a letter or hold phone up to patient's ear. Acknowledge that people grieve in their own way, he is old enough to be given honest info (they have) and choice about how to say goodbye)  3. Discussed hospice consult. Explained that patient will move to new room today (605), care will remain same, focus on comfort. Possible that he will pass here in hospital in next hours to days. If stable for some time , hospice may work to transition him back to [employer] for EOL care. 4. Communicated plan of care with: Palliative IDT, attending MD, bedside nurse        GOALS OF CARE / TREATMENT PREFERENCES:     GOALS OF CARE:  Patient/Health Care Proxy Stated Goals: Comfort      TREATMENT PREFERENCES:   Code Status: DNR    Advance Care Planning:  Advance Care Planning 3/7/2018   Patient's Healthcare Decision Maker is: Legal Next of Aoy 69   Primary Decision Maker Name Thiago Beyer   Primary Decision Maker Phone Number [phone]   Primary Decision Maker Relationship to Patient Adult child   Confirm Advance Directive Yes, on file   Does the patient have other document types Do Not Resuscitate; Power of        Medical Interventions: Comfort measures   Other Instructions:   Artificially Administered Nutrition: No feeding tube     Other:    As far as possible, the palliative care team has discussed with patient / health care proxy about goals of care / treatment preferences for patient.            HISTORY:     History obtained from: chart, family    CHIEF COMPLAINT: admitted after fall at home    HPI/SUBJECTIVE:    The patient is:   [] Verbal and participatory  [x] Non-participatory due to:     80year old male with several falls 3/3/18, seen in ER, head CT negative,  Wendy Human some more at home and returned with AMS, confusion lethargy, now CT showing small subdurals bilaterally    Clinical Pain Assessment (nonverbal scale for severity on nonverbal patients):   Clinical Pain Assessment  Severity: 0     Activity (Movement): Lying quietly, normal position    Duration: for how long has pt been experiencing pain (e.g., 2 days, 1 month, years)  Frequency: how often pain is an issue (e.g., several times per day, once every few days, constant)     FUNCTIONAL ASSESSMENT:     Palliative Performance Scale (PPS):  PPS: 10       PSYCHOSOCIAL/SPIRITUAL SCREENING:     Palliative IDT has assessed this patient for cultural preferences / practices and a referral made as appropriate to needs (Cultural Services, Patient Advocacy, Ethics, etc.)    Advance Care Planning:  Advance Care Planning 3/7/2018   Patient's Healthcare Decision Maker is: Legal Next of Ayo Brito   Primary Decision Maker Name Carlos Brar   Primary Decision Maker Phone Number [phone]   Primary Decision Maker Relationship to Patient Adult child   Confirm Advance Directive Yes, on file   Does the patient have other document types Do Not Resuscitate; Power of        Any spiritual / Caodaism concerns:  [] Yes /  [x] No    Caregiver Burnout:  [] Yes /  [x] No /  [] No Caregiver Present      Anticipatory grief assessment:   [x] Normal  / [] Maladaptive       ESAS Anxiety:      ESAS Depression:          REVIEW OF SYSTEMS:     Positive and pertinent negative findings in ROS are noted above in HPI. The following systems were [] reviewed / [x] unable to be reviewed as noted in HPI  Other findings are noted below. Systems: constitutional, ears/nose/mouth/throat, respiratory, gastrointestinal, genitourinary, musculoskeletal, integumentary, neurologic, psychiatric, endocrine. Positive findings noted below. Modified ESAS Completed by: provider           Pain: 0           Dyspnea: 0                    PHYSICAL EXAM:     From RN flowsheet:  Wt Readings from Last 3 Encounters:   03/07/18 70.3 kg (155 lb)   03/03/18 75.3 kg (166 lb)   02/10/18 72 kg (158 lb 11.7 oz)     Blood pressure 157/81, pulse 93, temperature 100.1 °F (37.8 °C), resp. rate 22, height 6' (1.829 m), weight 70.3 kg (155 lb), SpO2 94 %.     Pain Scale 1: Adult Nonverbal Pain Scale  Pain Intensity 1: 0              Pain Intervention(s) 1: Medication (see MAR)  Last bowel movement, if known:     Constitutional:  Elderly male  Not moving spontaneously during exam  Shallow, rapid breathing  Not awake, not following commands     HISTORY:     Active Problems:    Subdural hematoma ([address] (3/5/2018)      Past Medical History:   Diagnosis Date    Acute confusion 9/29/2012    Atrial fibrillation ([facility]     CHRONIC    Borderline diabetes mellitus     CKD (chronic kidney disease)     Diet-controlled type 2 diabetes mellitus ([facility]     Dyslipidemia     Hypertension     Hypertensive cardiovascular disease     Meniere disease     Mitral regurgitation     echo 3-27-07    Neurological disorder     dizziness, tilt table test 5-4-07 unremarkable    NPH (normal pressure hydrocephalus)     Shunt Dr Graeme Lobato MCV     Orthostatic hypotension     Other ill-defined conditions(799.89)     planter facia,2 achilles    Peripheral neuropathy     Stroke ([facility]     4 TIA    TIA (transient ischemic attack)       Past Surgical History:   Procedure Laterality Date    HX APPENDECTOMY      HX CHOLECYSTECTOMY      HX HERNIA REPAIR      HX ORTHOPAEDIC      Left tkr,,rotator cuff    HX TURP        Family History   Problem Relation Age of Onset    Heart Disease Father       History reviewed, no pertinent family history.   Social History   Substance Use Topics    Smoking status: Never Smoker    Smokeless tobacco: Never Used    Alcohol use Yes      Comment: weekly     Allergies   Allergen Reactions    Erythromycin Rash    Penicillins Rash    Tetanus Vaccines And Toxoid Rash      Current Facility-Administered Medications   Medication Dose Route Frequency    levETIRAcetam (KEPPRA) 1,000 mg in 0.9% sodium chloride 100 mL IVPB  1,000 mg IntraVENous Q12H    haloperidol (HALDOL) 2 mg/mL oral solution 2 mg  2 mg SubLINGual Q6H PRN    Or    haloperidol lactate (HALDOL) injection 2 mg  2 mg IntraVENous Q6H PRN    LORazepam (ATIVAN) injection 1 mg  1 mg IntraVENous Q15MIN PRN    glycopyrrolate (ROBINUL) injection 0.2 mg  0.2 mg IntraVENous Q4H PRN    ketorolac (TORADOL) injection 30 mg  30 mg IntraVENous Q8H PRN    HYDROmorphone (PF) (DILAUDID) injection 0.5 mg  0.5 mg IntraVENous Q15MIN PRN    LORazepam (ATIVAN) injection 1 mg  1 mg IntraVENous QHS    sodium chloride (NS) flush 5-10 mL  5-10 mL IntraVENous Q8H    sodium chloride (NS) flush 5-10 mL  5-10 mL IntraVENous PRN    ondansetron (ZOFRAN) injection 4 mg  4 mg IntraVENous Q4H PRN    haloperidol lactate (HALDOL) injection 2 mg  2 mg IntraVENous Q4H PRN    acetaminophen (TYLENOL) suppository 650 mg  650 mg Rectal Q6H PRN          LAB AND IMAGING FINDINGS:     Lab Results   Component Value Date/Time    WBC 10.8 03/06/2018 04:48 AM    HGB 13.9 03/06/2018 04:48 AM    PLATELET 659 04/66/2824 04:48 AM     Lab Results   Component Value Date/Time    Sodium 144 03/06/2018 04:48 AM    Potassium 3.8 03/06/2018 04:48 AM    Chloride 112 (H) 03/06/2018 04:48 AM    CO2 22 03/06/2018 04:48 AM    BUN 30 (H) 03/06/2018 04:48 AM    Creatinine 1.59 (H) 03/06/2018 04:48 AM    Calcium 9.2 03/06/2018 04:48 AM    Magnesium 1.6 03/05/2018 01:11 PM    Phosphorus 2.4 (L) 09/30/2012 04:45 AM      Lab Results   Component Value Date/Time    AST (SGOT) 28 03/05/2018 01:11 PM    Alk. phosphatase 100 03/05/2018 01:11 PM    Protein, total 9.0 (H) 03/05/2018 01:11 PM    Albumin 3.8 03/05/2018 01:11 PM    Globulin 5.2 (H) 03/05/2018 01:11 PM     (H) 03/27/2010 04:05 PM     Lab Results   Component Value Date/Time    INR 1.2 (H) 09/30/2012 04:45 AM    INR (POC) 1.2 (H) 03/05/2018 12:39 PM    Prothrombin time 12.5 (H) 09/30/2012 04:45 AM    aPTT 31.1 09/30/2012 04:45 AM      No results found for: IRON, FE, TIBC, IBCT, PSAT, FERR   No results found for: PH, PCO2, PO2  No components found for: [place]   Lab Results   Component Value Date/Time     (H) 11/30/2017 03:37 AM    CK - MB 2.2 10/24/2013 04:39 PM                Total time: 35min  Counseling / coordination time, spent as noted above: 25  > 50% counseling / coordination?: yes    Prolonged service was provided for  []30 min   []75 min in face to face time in the presence of the patient, spent as noted above. Time Start:   Time End:   Note: this can only be billed with 58795 (initial) or 26423 (follow up).   If multiple start / stop times, list each separately.

## 2018-03-07 NOTE — HOSPICE
[facility]  [phone]    Social Work Admission Note  Patient Name: Ekaterina Hardy  YOB: 1923  Age: 80 y.o. Date of Visit: 03/07/18  Facility of Care: [facility] [place]  Patient Room: 605/01     Hospice Attending: No att. providers found  Hospice Diagnosis: Subdural hematoma ([address]    Level of Care:    [x]  GIP    []  Respite   []  Routine    NARRATIVE    This MSW and Jerod RN met with pt's son and Jeremy Lopez for [facility] Hospice admission. Pt is unrespsonsive. Pt is a 81 y/o CM with a hospice dx of SDH with comorbid A-fib, CKD  NPH, and agitation. Pt is , his wife passed away 7/2017. Pt and wife are the parents of 3 sons Paula Pearl, and Crystal Epstein, and daughter Carlos Jameson. Pt is a retired , and [employer] [place] [employer]. Per chart review the family describes pt as stoic and sarcastic. Son Crystal Epstein reports pt enjoyed fishing at his home in [place]. Son is coping as well as can be expected considering the loss of mother 7/2017. MSW provided emotional support and supportive counseling for son in coping with pts EOL. Son noted\" things are closer now\". MSW validated his feelings of grief and loss. Son and MSW discussed hospice and EOL care. MSW provided information re EOL signs and symptoms. MSW and son discussed grandchildren's coping. Son reported grandson from [de-identified] was reading letters to pt. Son and MSW discussed the grief process and reliving grief from loss of mother 7/17. MSW provided grief support and education. Many family members are at bedside with pt. Son would like each to have some time alone with pt to say their goodbyes. MSW and son discussed resources for cremation. Son will let staff know FH determination.         ADVANCE CARE PLANNING    Code Status: DDNR  Durable DNR: Priscilla Zafar  _ No  Advance Care Planning 3/7/2018   Patient's Healthcare Decision Maker is: Legal Next of Ayo 69   Primary Decision Maker Name Gaviota Mcdonald   Primary Decision Maker Phone Number [phone]   Primary Decision Maker Relationship to Patient Adult child   Confirm Advance Directive Yes, on file   Does the patient have other document types Do Not Resuscitate; Power of        Relationship Status:  []  Single     []        []      []  Domestic Partner     [x]  /  []  Common Law  []    []  Unknown    If in a relationship, name of partner/spouse:  Duration of relationship:    Anabaptist: [employer]     Home: CREMATION, FAMILY IS MAKING CREMATION DETERMINATION   Resources Provided: Cass Champion93    Social Work Initial Assessment     Gender:  male    Race/Ethnicity: (soledad all that apply)  []  American [place] [facility] or [place] Native  []    []  Black or [place] American  []   or   []  [facility] or [facility]  [x]  White  []  Unknown      Service:    [x]  Yes   []  No       []  Unknown  Appropriate for Pinning Ceremony:   []  Yes      [x]  No  Is patient using VA benefits?    []  Yes      []  No     Primary Language: ENGLISH  []   Needed  []   utilized during visit    Ability to express thoughts/needs/feelings  []  Expressed thoughts/feelings/needs without difficulty  []  Requires extra time and cuing  []  Speech limited single words  []  Uses only gestures (eye, blinking eye or head movement/pointing)  []  Unable to express thoughts/feelings/needs (speech unintelligible or inappropriate)  [x]  Unresponsive  Notes:      Mental Status:  []  Alert-oriented to:     []  Person     []  Place     []  Time  []  Comatose-responds to:    []   Verbal stimuli    []  Tactile stimuli    []  Painful stimuli  []  Forgetful  []  Disoriented/Confused  []  Lethargic  []  Agitated  [x]  Other (specify): Unresponsive     Notes:      Patients description of Illness/Current Health Status:    [x]  Patient unable to discuss Unresponsive     []  Patient unwilling to discuss  []  (Specify)        Knowledge/Understanding of Disease Process  Patient:    []  Demonstrates knowledge/understanding of disease process   []  Demonstrates knowledge/understanding of treatment plan   []  Demonstrates knowledge/understanding of prognosis   []  Demonstrates acceptance of prognosis   []  Demonstrates knowledge/understanding of resuscitation status   [x]  Other (specify)Unresponsive    Caregiver:   [x]  Demonstrates knowledge/understanding of disease process   [x]  Demonstrates knowledge/understanding of treatment plan   [x]  Demonstrates knowledge/understanding of prognosis   [x]  Demonstrates acceptance of prognosis   [x]  Demonstrates knowledge/understanding of resuscitation status   [x]  Other (specify) MOTHER  IN . Notes:      Patients living arrangement/care setting:  Use the PRIOR COLUMN when the PATIENTS current health status necessitated a change in his/her primary residence. Prior Current Response              []             []    Patients own home/residence              []             []    Home of family member/friend              []             []    Boarding home              [x]             []    Assisted living facility/half-way center              [x]             []    Hospital/Acute care facility              []             []    Skilled nursing facility              []             []    Long term care facility/Nursing home              []             [x]    Hospice in Patient      Primary Caregiver:  []  No Primary Caregiver  Name of Primary Caregiver: Eula Raman   Relationship or Primary Caregiver:    []  Spouse/Significant other       [x]  Natural Child        []  Step child       []  Sibling   []  Parent   []  Friend/Neighbor   []  Community/Baptist Volunteer   []  Paid help   []  Other (specify):___________  Notes:       Family members/Significant others:  Name: Hilda Pimentel   Relationship: SON  Phone Number: [phone]  Actively involved in care?   [x]  Yes  []  No    Name:  Relationship:  Phone Number:  Actively involved in care? []  Yes  []  No    Name:  Relationship:  Phone Number:  Actively involved in care? []  Yes  []  No    Social support systems: (select ONE best description)  [x]  Excellent social support system which includes three or more family members or friends  []  Good social support system which includes two or less members or friends  []  [address] support which includes one family member or friend  []  Poor social support; no family members or friends; basically ALONE  Notes:      Emotional Status: (soledad all that apply)    Patient Caregiver Response                 [x]                [x]    Mood/Affect stable and appropriate                   []                []    Angry                 []                []    Anxious                 []                []    Apprehensive                 []                []    Avoidant                 []                []    Clinging                 []                []    Depressed                 []                []    Distraught                 []                []    Elated                 []                []    Euphoric                 []                []    Fearful                 []                []    Flat Affect                 []                []    Helpless                 []                []    Hostile                 []                []    Impulsive                 []                []    Irritable                 []                []    Labile                 []                []    Manic                 []                []    Restlessness                 []                [x]    Sad                 []                []    Suspicious                 []                []    Tearful                 []                []    Withdrawn     Notes:     Coping Skills (strengths/weakness):    Patient: Coping Skills (strength/weakness):  Unresponsive     Family/caregiver (strength/weakness): FAMILY IS WELL SUPPORTED, REALISTIC, ACCEPTING/LOST MOTHER 7/17, GRIEVING MOTHER.   Donner of care (soledad all that apply):     [x]  No burden evident   []  Family must administer medications   []  Illness causing financial strain   []  Family/Support feels overwhelmed   []  Family/Support sleep disturbed with patients care   []  Patients care causes extra physical stress  of death  []  Illness causes changes in family lifestyle  []  Illness impacting family/support employment  []  Family experiencing increased time demands  []  Patients behavior endangers family  []  Denial of patients illness  []  Concern over outcome of illness/fear  []  Patients behavior embarrassing to family   Notes:      Risk Factors: (soledad all that apply):    [x]  No burden evident   []  Alcohol abuse  []  Financial resources inadequate to meet basic needs (food/house/etc)  []  Financial resources inadequate to meet health care needs (supplies/equipment/medications)  []  Food/nutrition resources inadequate  []  Home environment unsafe/inadequate for home care  []  Homicidal risk  []  Lives alone or without concerned relatives  []  Multiple medications/complex schedule  []  Physical limitations increase likelihood of falls  []  Plan of care/treatments complicated  []  Substance use/abuse  []  Suicidal risk  []  Visual impairment threatens safety/ability to perform self-care  []  Other (specify):     Abuse/Neglect (actual/potential risks):  [x]  No signs of abuse/neglect  []  History of abuse/neglect                 []  [id number]          []  Sexual  []  History of domestic violence  []  Lacks adequate physical care  []  Lacks emotional nurturing/support  []  Lacks appropriate stimulation/cognitive experiences  []  Left alone inappropriately  []  Lacks necessary supervision  []  Inadequate or delayed medical care  []  Unsafe environment (i.e guns/drug use/history of violence in the home/etc.)  []  Bruising or other physical signs of injury present  []  Other (specify):  Notes:   []  Refer to child/adult protective services      Current Sources of Stress (in Addition to Current Illness):   []  None reported  []  Bills/Debt    []  Career/Job change    []   (short term)    []   (long term)    []  Death of a child (recent)    [x]  Death of a parent (recent)   []  Death of a spouse (recent)   []  Employment status changed   []  Family discord    []  Financial loss/Inadequate inther (specify):come  []  Job loss  []  Legal issues unresolved  []  Lifestyle change  []  Marital discord  []  Marriage within the last year  []  Paperwork (insurance/legal/etc) overwhelming  []  Separation/Divorce  []  Other (specify):  Notes:      Current [employer] Being Utilized     1. [facility] AT [facility]        Interventions/Plan of Care     1. Assess social and emotional factors related to coping with end of life issues  2. Community resource planning/referral   3. Relocation to different care setting if/when symptoms stabilize  4.      Discharge Planning     [facility] AT [facility] [place]  MSW Assessment Completed by: Fara Valles  03/07/18    Time In: 2;40 PM     Time Out:4;00 PM

## 2018-03-07 NOTE — HOSPICE
[facility]  [phone]     Patient Name: Jarocho Goyal  YOB: 1923  Age: 80 y.o. 190 [facility] RN Note:  Hospice consult received, reviewing chart. Will follow up with Unit Nurse and Care Manager to discuss plan of care, patient status and discharge disposition within the hour. Thank you for the opportunity to be of service to this patient.     Smitha Garnett RN

## 2018-03-07 NOTE — DISCHARGE SUMMARY
Discharge Summary       PATIENT ID: Eufemia Smith  MRN: [id number]   YOB: 1923    DATE OF ADMISSION: 3/5/2018 12:29 PM    DATE OF DISCHARGE: 3/7/2018    PRIMARY CARE PROVIDER: Jonel Chavez MD     ATTENDING PHYSICIAN: Ranjana Fernandez MD   DISCHARGING PROVIDER: Ranjana Fernandez MD    To contact this individual call [phone] and ask the  to page. If unavailable ask to be transferred the Adult Hospitalist Department. CONSULTATIONS: IP CONSULT TO NEUROSURGERY  IP CONSULT TO NEUROSURGERY  IP CONSULT TO HOSPITALIST  IP CONSULT TO PALLIATIVE CARE - PROVIDER    PROCEDURES/SURGERIES: * No surgery found *    ADMITTING [address] [place] [address] COURSE:     Pt admitted to [facility]note 3[facility]0 [place] , made comfort care 3/6. Admission Summary:      History of Present Illness:   Martha Needs a 80 y. o. male with history that include chronic AFIB not on [address] due to fall risk and NPH with  shunt presents with AMS which has been worsening since a fall on 3/3/18.  At that time CT of head was negative, his family noticed a change in mentation which continued to worsen. Today he was extremely confused, combative, and lethargic. Stephanie Diallo was brought back to the ER where he was this time found to have a right sided subdural hematoma.  In addition the son reports that he sustained anoghter GLF shortly upon returning to the facility where he lives at following the ER visit on 3/3/18.  Unable to obtain history or ROS due to clinical condition. Stephanie Diallo is very obtunded but slightly agitated due to confusion.  Required ativan IV and then drip in ER to help with Agitation.            Interval history / Subjective:         3/6:  Spent time with son in pt's room, confimed: plan is for comfort care, seems best to go hospice      3/7:  Again spent time with pt and son/family , recommended Hospice, , on this bases, Hospice to see for possible intake soon.  , seems tomorrow for family meeting          Assessment & Plan:       1. Acute encephalopathy secondary to subdural hematoma. 2. Coma in the setting of Acute right frontoparietal subdural hematoma, AMS, worsening mentation, Lethargy, Obtunded, [place] Coma Scale 6, posturing, requiring ICU monitoring. 3.  Cerebral edema & Brain compression in the setting of Acute right frontoparietal subdural hematoma, Head CT with left cerebral mass effect with 5 mm of  rightward midline shift requiring Neurology consult and ICU monitoring  4. NPH with  shunt  5. Chronic AFIB not on  OAC due to fall risk  6. BPH  7. HTN essential with elevated BP POA  8. DNR             Code status: DNR  DVT prophylaxis: scd's     Care Plan discussed with: Patient/Family and Nurse  Disposition: Hospice  and TBD      Hospital Problems  Date Reviewed: 2/5/2018             Codes Class Noted POA     Subdural hematoma ([facility] [address] ICD-10-CM: I62.00  ICD-9-CM: 189. 1   3/5/2018 Unknown                     Review of Systems:   Review of systems not obtained due to patient factors.         Vital Signs:    Last 24hrs VS reviewed since prior progress note.  Most recent are:       Visit Vitals    /81 (BP 1 Location: Left arm, BP Patient Position: At rest;Lying left side)    Pulse 93    Temp 100.1 °F (37.8 °C)    Resp 22    Ht 6' (1.829 m)    Wt 70.3 kg (155 lb)    SpO2 94%    BMI 21.02 kg/m2            Intake/Output Summary (Last 24 hours) at 03/07/18 1344  Last data filed at 03/07/18 0800    Gross per 24 hour   Intake              200 ml   Output              660 ml   Net             -460 ml         Physical Examination:             Constitutional:  pre terminal    Pu;L clear  CV: rr                                    Neurologic:  wd non verbal                      Labs:           Recent Labs       03/06/18   0448  03/05/18   1311   WBC  10.8  8.2   HGB  13.9  14.4   HCT  42.0  42.5   PLT  155  157           Recent Labs       03/06/18   0448  03/05/18   1311   NA  144  141   K  3.8  3.8   CL  112*  107   CO2 22  22   BUN  30*  25*   CREA  1.59*  1.62*   GLU  279*  250*   CA  9.2  9.9   MG   --   1.6          Recent Labs       03/05/18   1311   SGOT  28   ALT  15   AP  100   TBILI  2.5*   TP  9.0*   ALB  3.8   GLOB  5.2*          Recent Labs       03/05/18   1239   INR  1.2*      No results for input(s): FE, TIBC, PSAT, FERR in the last 72 hours. No results found for: FOL, RBCF   No results for input(s): PH, PCO2, PO2 in the last 72 hours.       Recent Labs       03/05/18   1311   TROIQ  <0.04            Lab Results   Component Value Date/Time     Cholesterol, total 157 02/17/2011 05:22 AM     HDL Cholesterol 60 02/17/2011 05:22 AM     LDL, calculated 68.4 02/17/2011 05:22 AM     Triglyceride 143 02/17/2011 05:22 AM     CHOL/HDL Ratio 2.6 02/17/2011 05:22 AM            Lab Results   Component Value Date/Time     Glucose (POC) 268 (H) 03/05/2018 12:38 PM     Glucose (POC) 181 (H) 03/03/2018 06:07 PM     Glucose (POC) 219 (H) 02/11/2018 11:36 AM     Glucose (POC) 113 (H) 02/11/2018 06:13 AM     Glucose (POC) 219 (H) 02/10/2018 09:18 PM            Lab Results   Component Value Date/Time     Color YELLOW/STRAW 03/05/2018 01:11 PM     Appearance CLEAR 03/05/2018 01:11 PM     Specific gravity 1.021 03/05/2018 01:11 PM     Specific gravity >1.030 (H) 04/04/2010 04:00 PM     pH (UA) 6.5 03/05/2018 01:11 PM     Protein 100 (A) 03/05/2018 01:11 PM     Glucose 100 (A) 03/05/2018 01:11 PM     Ketone TRACE (A) 03/05/2018 01:11 PM     Bilirubin NEGATIVE  03/03/2018 10:08 PM     Urobilinogen 1.0 03/05/2018 01:11 PM     Nitrites NEGATIVE  03/05/2018 01:11 PM     Leukocyte Esterase NEGATIVE  03/05/2018 01:11 PM     Epithelial cells FEW 03/05/2018 01:11 PM     Bacteria NEGATIVE  03/05/2018 01:11 PM     WBC 0-4 03/05/2018 01:11 PM     RBC 0-5 03/05/2018 01:11 PM            Medications Reviewed:             Current Facility-Administered Medications   Medication Dose Route Frequency    levETIRAcetam (KEPPRA) 1,000 mg in 0.9% sodium chloride 100 mL IVPB  1,000 mg IntraVENous Q12H    haloperidol (HALDOL) 2 mg/mL oral solution 2 mg  2 mg SubLINGual Q6H PRN     Or    haloperidol lactate (HALDOL) injection 2 mg  2 mg IntraVENous Q6H PRN    LORazepam (ATIVAN) injection 1 mg  1 mg IntraVENous Q15MIN PRN    glycopyrrolate (ROBINUL) injection 0.2 mg  0.2 mg IntraVENous Q4H PRN    ketorolac (TORADOL) injection 30 mg  30 mg IntraVENous Q8H PRN    HYDROmorphone (PF) (DILAUDID) injection 0.5 mg  0.5 mg IntraVENous Q15MIN PRN    LORazepam (ATIVAN) injection 1 mg  1 mg IntraVENous QHS    sodium chloride (NS) flush 5-10 mL  5-10 mL IntraVENous Q8H    sodium chloride (NS) flush 5-10 mL  5-10 mL IntraVENous PRN    ondansetron (ZOFRAN) injection 4 mg  4 mg IntraVENous Q4H PRN    haloperidol lactate (HALDOL) injection 2 mg  2 mg IntraVENous Q4H PRN    acetaminophen (TYLENOL) suppository 650 mg  650 mg Rectal Q6H PRN              DISCHARGE DIAGNOSES / PLAN:      1.  see above           DISPOSITION:    Home With:   OT  PT  HH  RN       Long term SNF/Inpatient Rehab    Independent/assisted living   xx Hospice    Other:       PATIENT CONDITION AT DISCHARGE:     Functional status   x Poor     Deconditioned     Independent      Cognition     Lucid     Forgetful    x Coma      Catheters/lines (plus indication)   x Schmidt     PICC     PEG     None      Code status     Full code    x DNR      PHYSICAL EXAMINATION AT DISCHARGE:   Refer to Progress Note      Greater than  20  minutes were spent with the patient on counseling and coordination of care    Signed:   Eneida Gibbons MD  3/7/2018  4:28 PM

## 2018-03-07 NOTE — PROGRESS NOTES
TRANSFER - IN REPORT:    Verbal report received from Vika(name) on Karlo Farnsworth  being received from ICU(unit) for routine progression of care      Report consisted of patients Situation, Background, Assessment and   Recommendations(SBAR). Information from the following report(s) SBAR, Kardex, Intake/Output, MAR and Recent Results was reviewed with the receiving nurse. Opportunity for questions and clarification was provided. Assessment completed upon patients arrival to unit and care assumed. 1100:  Primary Nurse Zahida Phillips RN and Romy Roca RN performed a dual skin assessment on this patient No impairment noted  Brendan score is 10    Scattered bruising noted to bilateral upper extremities.

## 2018-03-07 NOTE — PROGRESS NOTES
0730- Bedside and Verbal shift change report given to Sarath Marie (oncoming nurse) by Indira [address] (offgoing nurse). Report included the following information SBAR, Kardex, ED Summary, Intake/Output, MAR, Recent Results and Cardiac Rhythm AFib.

## 2018-03-08 NOTE — PROGRESS NOTES
Patient asleep during this encounter. Patient being attended to by two of his sons. Provided empathic listening presence as the sons explained that they had been through this experience with their mother not too long ago. Assured them of prayer and emotionally encouraged them. They were watching the [place] basketball game because their dad is a big fan; occasionally, they call out the score to their father.

## 2018-03-08 NOTE — PROGRESS NOTES
Bedside shift change report given to jermaine (oncoming nurse) by Manuel Joyner (offgoing nurse). Report included the following information SBAR, Kardex, MAR and Recent Results.

## 2018-03-08 NOTE — PROGRESS NOTES
Bedside shift change report given to Lucero Morin (oncoming nurse) by Ronna Perez (offgoing nurse). Report included the following information SBAR.

## 2018-03-08 NOTE — PROGRESS NOTES
NUTRITION       Nutrition screening referral was triggered based on results obtained during nursing admission assessment. The patient's chart was reviewed and nutrition assessment is not indicated at this time. Pt under Hospice at this time. Please consult if this nutrition team can be of assistance. Thank you.      [address]

## 2018-03-08 NOTE — PROGRESS NOTES
03/08/18 0748   Vital Signs   Temp 98.8 °F (37.1 °C)   Temp Source Axillary   Pulse (Heart Rate) 89   Heart Rate Source Monitor   Resp Rate 20   O2 Sat (%) 92 %   Level of Consciousness (!) Responds to Pain   /79   MAP (Calculated) 99   BP 1 Method Automatic   BP 1 Location Left arm   BP Patient Position At rest   MEWS Score 3   Hospice.

## 2018-03-08 NOTE — HOSPICE
[facility] Help to Those in Need  [phone]    Patient Name: Alla Valerio  YOB: 1923  Age: 80 y.o. [facility] RN Note:      Courtesy visit to pt's room. Pt appeared well palliated, no signs of dyspnea, breathing unlabored on RA. No signs of nonverbal pain or agitation. Son Everardo Goss was reading to pt and says his dad loved basketball and he was going to put on TV soon so pt could hear. Gave him GFMS booklet. New order from Dr. Samir Blanchard to change LOC to routine. Thank you for the opportunity to be of service to this patient.     Andrew Hernandes RN [facility]

## 2018-03-08 NOTE — H&P
[facility] Help to Those in Need  [phone]    Patient Name: Taylor Bailey  YOB: 1923    Date of Provider Hospice Visit: 03/08/18    Level of Care:   [] General Inpatient (GIP)    [x] Routine   [] Respite    Location of Care:  [x] [facility] [place] [] [facility] [] [facility] [] [facility] [] Elsa Garcia [address] [place] [address]    Date of [facility] Admission: 03/08/18  Hospice Medical Director at time of admission: Dr. Khang Villar Diagnosis: Acute Subdural hemorrhage  Diagnoses RELATED to the terminal prognosis: Atrial fibrillation, hx of CVA embolic, CAD, CHF, CKD, recurrent falls  Other Diagnoses: DJD, Mitral regurgitation, neuropathy     HOSPICE SUMMARY   Do not cut and paste chart information other than imaging findings    Taylor Bailey is a 80y.o. year old who was admitted to [facility]. The patient's principle diagnosis of acute subdural hemorrhage has resulted in further rapid progression of disease and steady decline in function. Pt initially admitted to hospital s/p fall that led to SDH with significant right midline shift. Pt doing poorly, currently unresponsive with symptoms pf worsening pain and agitation, also had a seizure like activity. Family expressed wishes for no surgery or aggressive interventions. Refer to LCD     Functionally, the patient's Karnofsky and/or Palliative Performance Scale has declined over a period of days and is estimated at 10-20%. The patient is dependent on the following ADLs:all ADL's    Objective information that support this patients limited prognosis includes: CT 3/6/18  IMPRESSION:   Overall stable bilateral subdural hematomas, left larger than right. Stable  right subarachnoid hemorrhage. Increased rightward midline shift. Mildly  increased size of the lateral ventricles. No new acute intracranial hemorrhage. .      The patient/family chose comfort measures with the support of Hospice. HOSPICE DIAGNOSES   Active Symptoms:  1. Restlessness/agitation; intermittent  2. Decreased responsiveness  3. Inability to eat/drink  4. Pain; non verbal     PLAN   1. Pt initially admitted for GIP level care due to symptoms of pain and agitation and decreased responsiveness. Pt had received several prn doses of dilaudid and ativan for managing pain and agitation and required titration of medications, close monitoring for seizures while on IV keppra. Now pt has stabilized somewhat and not needed prn medications much so will switch to routine level hospice care  2. Continue current regimen of medications    3.  and SW to support family needs  4. Disposition: home with hospice as symptoms being stabilized and pt survives beyond next few days. Prognosis estimated based on 03/08/18 clinical assessment is:   [x] Hours to Days    [] Days to Weeks    [] Other:    Communicated plan of care with: Hospice Case Manager; Hospice IDT; Care Team     GOALS OF CARE     Resuscitation Status: DNR  Durable DNR: [x] Yes [] No    Advance Care Planning 3/8/2018   Patient's Healthcare Decision Maker is: Legal Next of Ayo 69   Primary Decision Maker Name Vivek Zendejas   Primary Decision Maker Phone Number [phone]   Primary Decision Maker Relationship to Patient Adult child   Confirm Advance Directive Yes, on file   Does the patient have other document types Do Not Resuscitate        HISTORY     History obtained from: chart, family, hospice staff    CHIEF COMPLAINT: N/A  The patient is:   [] Verbal  [x] Nonverbal  [x] Unresponsive    HPI/SUBJECTIVE:  Pt seen lethargic and unresponsive, breathing slow and shallow, family at bedside, poor oral hygiene, pt becomes agitated when moved or during mouth care. Pt has received scheduled dilaudid every 4 hours since yesterday post admission, but no prn dilaudid and has gotten 1 prn ativan.    Pt is also on Keppra twice daily       REVIEW OF SYSTEMS     The following systems were: [] reviewed  [x] unable to be reviewed      Adult Non-Verbal Pain Assessment Score: 1    Face  [x] 0   No particular expression or smile  [] 1   Occasional grimace, tearing, frowning, wrinkled forehead  [] 2   Frequent grimace, tearing, frowning, wrinkled forehead    Activity (movement)  [x] 0   Lying quietly, normal position  [] 1   Seeking attention through movement or slow, cautious movement  [] 2   Restless, excessive activity and/or withdrawal reflexes    Guarding  [] 0   Lying quietly, no positioning of hands over areas of body  [x] 1   Splinting areas of the body, tense  [] 2   Rigid, stiff    Physiology (vital signs)  [x] 0   Stable vital signs  [] 1   Change in any of the following: SBP > 20mm Hg; HR > 20/minute  [] 2   Change in any of the following: SBP > 30mm Hg; HR > 25/minute    Respiratory  [x] 0   Baseline RR/SpO2, compliant with ventilator  [] 1   RR > 10 above baseline, or 5% drop SpO2, mild asynchrony with ventilator  [] 2   RR > 20 above baseline, or 10% drop SpO2, asynchrony with ventilator     FUNCTIONAL ASSESSMENT     Palliative Performance Scale (PPS):10-20%     PSYCHOSOCIAL/SPIRITUAL ASSESSMENT     Active Problems:    Subdural hematoma (HCC) (3/5/2018)      Past Medical History:   Diagnosis Date    Acute confusion 9/29/2012    Atrial fibrillation ([address]     CHRONIC    Borderline diabetes mellitus     CKD (chronic kidney disease)     Diet-controlled type 2 diabetes mellitus (HCC)     Dyslipidemia     Hypertension     Hypertensive cardiovascular disease     Meniere disease     Mitral regurgitation     echo 3-27-07    Neurological disorder     dizziness, tilt table test 5-4-07 unremarkable    NPH (normal pressure hydrocephalus)     Shunt Dr Liyah Kamara MCV     Orthostatic hypotension     Other ill-defined conditions(799.89)     planter facia,2 achilles    Peripheral neuropathy     Stroke ([address]     4 TIA    TIA (transient ischemic attack)       Past Surgical History:   Procedure Laterality Date    HX APPENDECTOMY      HX CHOLECYSTECTOMY      HX HERNIA REPAIR      HX ORTHOPAEDIC      Left tkr,,rotator cuff    HX TURP        Social History   Substance Use Topics    Smoking status: Never Smoker    Smokeless tobacco: Never Used    Alcohol use Yes      Comment: weekly     Family History   Problem Relation Age of Onset    Heart Disease Father       Allergies   Allergen Reactions    Erythromycin Rash    Penicillins Rash    Tetanus Vaccines And Toxoid Rash      Current Facility-Administered Medications   Medication Dose Route Frequency    levETIRAcetam (KEPPRA) 1,000 mg in 0.9% sodium chloride 100 mL IVPB  1,000 mg IntraVENous Q12H    LORazepam (ATIVAN) injection 1 mg  1 mg IntraVENous Q15MIN PRN    acetaminophen (TYLENOL) suppository 650 mg  650 mg Rectal Q4H PRN    ketorolac (TORADOL) injection 30 mg  30 mg IntraVENous Q8H PRN    scopolamine (TRANSDERM-SCOP) 1 mg over 3 days 1 Patch  1 Patch TransDERmal Q72H    bisacodyl (DULCOLAX) suppository 10 mg  10 mg Rectal DAILY PRN    HYDROmorphone (PF) (DILAUDID) injection 0.5 mg  0.5 mg IntraVENous Q15MIN PRN    HYDROmorphone (PF) (DILAUDID) injection 0.5 mg  0.5 mg IntraVENous Q4H    saline peripheral flush soln 5 mL  5 mL InterCATHeter PRN        PHYSICAL EXAM     Wt Readings from Last 3 Encounters:   03/07/18 70.3 kg (155 lb)   03/03/18 75.3 kg (166 lb)   02/10/18 72 kg (158 lb 11.7 oz)       Visit Vitals    /79 (BP 1 Location: Left arm, BP Patient Position: At rest)    Pulse 89    Temp 98.8 °F (37.1 °C)    Resp 20    SpO2 92%       Supplemental O2  [x] Yes  [] NO  Last bowel movement:     Currently this patient has:  [x] Peripheral IV [] PICC  [] PORT [] ICD    [x] Schmidt Catheter [] NG Tube   [] PEG Tube    [] Rectal Tube [] Drain  [] Other:     Constitutional: lethargic, pale, thin, frail, unresponsive, responds only to painful stimuli and movements  Eyes: closed  ENMT: dry oral mucosa, dried coating on tongue  Cardiovascular: distant heart sounds, tachycardia  Respiratory: Slightly labored breathing,  diminished air entry  Gastrointestinal: sunken belly, soft, BS present, non tender  Musculoskeletal:thin, contractures, deformities, tender, non tender  Skin: warm, dry, no rash, stasis circulation changes  Neurologic: lethargic, unresponsive  Psychiatric: N/A  Other: mcintyre's catheter draining           Pertinent Lab and or Imaging Tests:  Lab Results   Component Value Date/Time    Sodium 144 03/06/2018 04:48 AM    Potassium 3.8 03/06/2018 04:48 AM    Chloride 112 (H) 03/06/2018 04:48 AM    CO2 22 03/06/2018 04:48 AM    Anion gap 10 03/06/2018 04:48 AM    Glucose 279 (H) 03/06/2018 04:48 AM    BUN 30 (H) 03/06/2018 04:48 AM    Creatinine 1.59 (H) 03/06/2018 04:48 AM    BUN/Creatinine ratio 19 03/06/2018 04:48 AM    GFR est AA 49 (L) 03/06/2018 04:48 AM    GFR est non-AA 41 (L) 03/06/2018 04:48 AM    Calcium 9.2 03/06/2018 04:48 AM     Lab Results   Component Value Date/Time    Protein, total 9.0 (H) 03/05/2018 01:11 PM    Albumin 3.8 03/05/2018 01:11 PM           Total time: 70mts  Counseling / coordination time: 20mts spent discussing case with family and hospice staff  > 50% counseling / coordination?: no

## 2018-03-09 NOTE — HOSPICE
[facility]  [phone]    Routine Nursing Note   Patient Name: Alla Valerio  YOB: 1923  Age: 80 y.o. Date of Visit: 03/09/18  Facility of Care: [facility]  Patient Room: 60/     Hospice Attending: Mamie Wooten MD  Hospice Diagnosis: SDH  Subdural hematoma ([facility] [address]    Level of Care: Routine    ASSESSMENT, PLAN AND INTERVENTIONS     1. Continue Routine Level of Care  2. Nonverbal pain. Increase Dilaudid to 1mg IV q4 and PRN. 3. Seizure prophylaxis. Discontinue Keppra IV and start Ativan 2mg IV q4 and PRN  4. Urinary retention. Schmidt catheter placed. 5.  Support and educate family. 6. MSW and [address] [place] [address] Road visits. Spiritual Interventions:  visits as needed to support pt and family. Psych/ Social/ Emotional Interventions: Pt has 3 sons, two are alternating holding hobson, also DILs. They are encouraged to read and speak to him. Obviously a well loved parent. Care Coordination Needs: with Arua Ordaz RN    Care plan and New Orders discussed / approved with Dr. Samir Blanchard. Description History and Chart Review     List number of doses of PRN medications in last 24 hours: 4  Medication 1: Dilaudid 0.5mg IV  Number of doses: 2    Medication 2: Ativan 1mg IV  Number of doses: 2    Medication 3:   Number of doses:    DISCHARGE PLANNING     1. Discharge Plan: Remain inpatient as not stable for transfer  2. Patient/Family teaching: Signs and symptoms of actively dying, medications to manage. 3. Response to patient/family teaching: accepting and good understanding    ASSESSMENT    KARNOFSKY: 10%    Prognosis estimated based on 03/09/18 clinical assessment is:   [x] Few to Many Hours    Quality Measure: Patient self-reports:  [x]  No    ESAS:   Time of Assessment: 1400  Pain (1-10):5  Fatigue (1-10): 0  Shortness of breath (1-10):0  Nausea (1-10): 0  Appetite (1-10):    Anxiety: (1-10):   Depression: (1-10):   Well-being: (1-10):   Constipation: _ Yes  _ No  LAST BM: unknown    CLINICAL INFORMATION   Patient Vitals for the past 12 hrs:   Temp Pulse Resp BP SpO2   03/09/18 0848 98 °F (36.7 °C) 98 18 156/78 90 %       Currently this patient has:  [] Supplemental O2   [x] IV    [] PICC      [] PORT   [] NG Tube    [] PEG Tube   [] Ostomy     [x] Schmidt draining _amber______ urine  [] Other:     SIGNS/PHYSICAL FINDINGS     Skin (including wound):  [] Warm, dry, supple, intact and color normal for race  [x] Warm   [x] Dry   [] Cool     [] Clammy       [] Diaphoretic    Turgor   [] Normal   [x] Decreased  Color:   [] Pink   [x] Pale   [] Cyanotic   [] Erythema   [] Jaundice   [] Normal for Race  []  Wounds:    Neuro:  [] Lethargy  [] Restlessness / agitation  [] Confusion / delirium  [] Hallucinations  [x] Responds to maximal stimulation  [] Unresponsive  [] Seizures     Cardiac:  [] Dyspnea on Exertion  [] JVD  [] Murmur  [] Palpitations  [] Hypotension  [x] Hypertension  [] Tachycardia  [] Bradycardia  [] Irregular HR  [] Pulses Decreased  [] Pulses Absent  [] Edema:       (Location, Grade and Pitting)  [] Mottling:      (Location)    Respiratory:  Breath sounds:    [] Diminished   [] Wheeze   [x] Rhonchi   [] Rales   [x] Even and unlabored  [] Labored:            [] Cough   [] Non Productive   [] Productive    [] Description:           [] Deep suctioned   [] O2 at ___ LPM  [] High flow oxygen greater than 10 LPM  [] Bi-Pap    GI  [] Abdomen (describe)   [] Ascites  [] Nausea  [] Vomiting  [] Incontinent of bowels  [x] Bowel sounds hypo  [] Diarrhea  [] Constipation (see above including last bowel movement)  [] Checked for impaction  [x] Last BM unknown    Nutrition  Diet:____NPO______  Appetite:   [] Good   [] Fair   [] Poor   [] Tube Feeding       [] Voiding  [] Incontinent   [x] Schmidt    Musculoskeletal  [] Balance/[place] Unsteady   [] Weak   Strength:    [] Normal    [] Limited    [x] Decreasing   Activities:    [] Up as tolerated   [x] Bedridden    [] Specify:    SAFETY  [x] 24 hr. Caregiver   [x] Side rails ? [x] Hospital bed   [] Reviewed Falls & Safety     ALLERGIES AND MEDICATIONS     Allergies:    Allergies   Allergen Reactions    Erythromycin Rash    Penicillins Rash    Tetanus Vaccines And Toxoid Rash       Current Facility-Administered Medications   Medication Dose Route Frequency    LORazepam (ATIVAN) injection 2 mg  2 mg IntraVENous Q4H    HYDROmorphone (PF) (DILAUDID) injection 1 mg  1 mg IntraVENous Q4H    LORazepam (ATIVAN) injection 1 mg  1 mg IntraVENous Q15MIN PRN    acetaminophen (TYLENOL) suppository 650 mg  650 mg Rectal Q4H PRN    ketorolac (TORADOL) injection 30 mg  30 mg IntraVENous Q8H PRN    scopolamine (TRANSDERM-SCOP) 1 mg over 3 days 1 Patch  1 Patch TransDERmal Q72H    bisacodyl (DULCOLAX) suppository 10 mg  10 mg Rectal DAILY PRN    HYDROmorphone (PF) (DILAUDID) injection 0.5 mg  0.5 mg IntraVENous Q15MIN PRN    saline peripheral flush soln 5 mL  5 mL InterCATHeter PRN          Visit Time In: 1300  Visit Time Out: 1935

## 2018-03-09 NOTE — PROGRESS NOTES
Bedside shift change report given to Marcheta Lesch, RN (oncoming nurse) by Marv Solorzano RN (offgoing nurse). Report included the following information SBAR, Kardex, Intake/Output, MAR and Recent Results.

## 2018-03-09 NOTE — PROGRESS NOTES
Bedside shift change report given to kassie(oncoming nurse) by  Praneeth(offgoing nurse). Report included the following information SBAR.

## 2018-03-09 NOTE — H&P
[facility]  [phone]    Patient Name: Robert Hoffmann  YOB: 1923    Date of Provider Hospice Visit: 03/09/18    Level of Care:   [] General Inpatient (GIP)    [x] Routine   [] Respite    Location of Care:  [x] [facility] [place] [] [facility] [] [facility] [] [facility] [] Elsa Garcia [address] [place] [address]    Date of [address] Admission: 03/08/18  Hospice Medical Director at time of admission: Dr. Maria C Lopez Diagnosis: Acute Subdural hemorrhage  Diagnoses RELATED to the terminal prognosis: Atrial fibrillation, hx of CVA embolic, CAD, CHF, CKD, recurrent falls  Other Diagnoses: DJD, Mitral regurgitation, neuropathy     HOSPICE SUMMARY   Do not cut and paste chart information other than imaging findings    Robert Hoffmann is a 80y.o. year old who was admitted to [facility]. The patient's principle diagnosis of acute subdural hemorrhage has resulted in further rapid progression of disease and steady decline in function. Pt initially admitted to hospital s/p fall that led to SDH with significant right midline shift. Pt doing poorly, currently unresponsive with symptoms pf worsening pain and agitation, also had a seizure like activity. Family expressed wishes for no surgery or aggressive interventions. Refer to LCD     Functionally, the patient's Karnofsky and/or Palliative Performance Scale has declined over a period of days and is estimated at 10-20%. The patient is dependent on the following ADLs:all ADL's    Objective information that support this patients limited prognosis includes: CT 3/6/18  IMPRESSION:   Overall stable bilateral subdural hematomas, left larger than right. Stable  right subarachnoid hemorrhage. Increased rightward midline shift. Mildly  increased size of the lateral ventricles. No new acute intracranial hemorrhage. .      The patient/family chose comfort measures with the support of Hospice. HOSPICE DIAGNOSES   Active Symptoms:  1. Restlessness/agitation; intermittent  2. Decreased responsiveness  3. Inability to eat/drink  4. Pain; non verbal     PLAN   1. Change to routine level care, more comfortable today, actively dying  2. Continue current regimen of medications  3. Increased dilaudid to 1mg every 4 hours  4. Continue keppra  5. Transderm scop patch    6.  and SW to support family needs  7. Disposition: home with hospice as symptoms being stabilized and pt survives beyond next few days. Prognosis estimated based on 03/09/18 clinical assessment is:   [x] Hours to Days    [] Days to Weeks    [] Other:    Communicated plan of care with: Hospice Case Manager; Hospice IDT; Care Team     GOALS OF CARE     Resuscitation Status: DNR  Durable DNR: [x] Yes [] No    Advance Care Planning 3/8/2018   Patient's Healthcare Decision Maker is: Legal Next of Ayo 69   Primary Decision Maker Name Vanita Carson   Primary Decision Maker Phone Number [phone]   Primary Decision Maker Relationship to Patient Adult child   Confirm Advance Directive Yes, on file   Does the patient have other document types Do Not Resuscitate        HISTORY     History obtained from: chart, family, hospice staff    CHIEF COMPLAINT: N/A  The patient is:   [] Verbal  [x] Nonverbal  [x] Unresponsive    HPI/SUBJECTIVE:  Pt seen lethargic and unresponsive, breathing slow and shallow, family at bedside, poor oral hygiene, pt becomes agitated when moved or during mouth care. Pt has received scheduled dilaudid every 4 hours since yesterday post admission, but no prn dilaudid and has gotten 1 prn ativan.    Pt is also on Keppra twice daily       REVIEW OF SYSTEMS     The following systems were: [] reviewed  [x] unable to be reviewed      Adult Non-Verbal Pain Assessment Score: 1    Face  [x] 0   No particular expression or smile  [] 1   Occasional grimace, tearing, frowning, wrinkled forehead  [] 2   Frequent grimace, tearing, frowning, wrinkled forehead    Activity (movement)  [x] 0   Lying quietly, normal position  [] 1   Seeking attention through movement or slow, cautious movement  [] 2   Restless, excessive activity and/or withdrawal reflexes    Guarding  [] 0   Lying quietly, no positioning of hands over areas of body  [x] 1   Splinting areas of the body, tense  [] 2   Rigid, stiff    Physiology (vital signs)  [x] 0   Stable vital signs  [] 1   Change in any of the following: SBP > 20mm Hg; HR > 20/minute  [] 2   Change in any of the following: SBP > 30mm Hg; HR > 25/minute    Respiratory  [x] 0   Baseline RR/SpO2, compliant with ventilator  [] 1   RR > 10 above baseline, or 5% drop SpO2, mild asynchrony with ventilator  [] 2   RR > 20 above baseline, or 10% drop SpO2, asynchrony with ventilator     FUNCTIONAL ASSESSMENT     Palliative Performance Scale (PPS):10-20%     PSYCHOSOCIAL/SPIRITUAL ASSESSMENT     Active Problems:    Subdural hematoma (HCC) (3/5/2018)      Past Medical History:   Diagnosis Date    Acute confusion 9/29/2012    Atrial fibrillation (HCC)     CHRONIC    Borderline diabetes mellitus     CKD (chronic kidney disease)     Diet-controlled type 2 diabetes mellitus ([address]     Dyslipidemia     Hypertension     Hypertensive cardiovascular disease     Meniere disease     Mitral regurgitation     echo 3-27-07    Neurological disorder     dizziness, tilt table test 5-4-07 unremarkable    NPH (normal pressure hydrocephalus)     Shunt Dr Onofre Rings MCV     Orthostatic hypotension     Other ill-defined conditions(799.89)     planter facia,2 achilles    Peripheral neuropathy     Stroke ([address]     4 TIA    TIA (transient ischemic attack)       Past Surgical History:   Procedure Laterality Date    HX APPENDECTOMY      HX CHOLECYSTECTOMY      HX HERNIA REPAIR      HX ORTHOPAEDIC      Left tkr,,rotator cuff    HX TURP        Social History   Substance Use Topics    Smoking status: Never Smoker    Smokeless tobacco: Never Used    Alcohol use Yes Comment: weekly     Family History   Problem Relation Age of Onset    Heart Disease Father       Allergies   Allergen Reactions    Erythromycin Rash    Penicillins Rash    Tetanus Vaccines And Toxoid Rash      Current Facility-Administered Medications   Medication Dose Route Frequency    HYDROmorphone (PF) (DILAUDID) injection 1 mg  1 mg IntraVENous Q4H    levETIRAcetam (KEPPRA) 1,000 mg in 0.9% sodium chloride 100 mL IVPB  1,000 mg IntraVENous Q12H    LORazepam (ATIVAN) injection 1 mg  1 mg IntraVENous Q15MIN PRN    acetaminophen (TYLENOL) suppository 650 mg  650 mg Rectal Q4H PRN    ketorolac (TORADOL) injection 30 mg  30 mg IntraVENous Q8H PRN    scopolamine (TRANSDERM-SCOP) 1 mg over 3 days 1 Patch  1 Patch TransDERmal Q72H    bisacodyl (DULCOLAX) suppository 10 mg  10 mg Rectal DAILY PRN    HYDROmorphone (PF) (DILAUDID) injection 0.5 mg  0.5 mg IntraVENous Q15MIN PRN    saline peripheral flush soln 5 mL  5 mL InterCATHeter PRN        PHYSICAL EXAM     Wt Readings from Last 3 Encounters:   03/07/18 70.3 kg (155 lb)   03/03/18 75.3 kg (166 lb)   02/10/18 72 kg (158 lb 11.7 oz)       Visit Vitals    /78    Pulse 98    Temp 98 °F (36.7 °C)    Resp 18    SpO2 90%       Supplemental O2  [x] Yes  [] NO  Last bowel movement:     Currently this patient has:  [x] Peripheral IV [] PICC  [] PORT [] ICD    [x] Schmidt Catheter [] NG Tube   [] PEG Tube    [] Rectal Tube [] Drain  [] Other:     Constitutional: lethargic, pale, thin, frail, unresponsive, responds only to painful stimuli and movements  Eyes: closed  ENMT: dry oral mucosa, dried coating on tongue  Cardiovascular: distant heart sounds, tachycardia  Respiratory: Slightly labored breathing,  diminished air entry  Gastrointestinal: sunken belly, soft, BS present, non tender  Musculoskeletal:thin, contractures, deformities, tender, non tender  Skin: warm, dry, no rash, stasis circulation changes  Neurologic: lethargic, unresponsive  Psychiatric: N/A  Other: mcintyre's catheter draining           Pertinent Lab and or Imaging Tests:  Lab Results   Component Value Date/Time    Sodium 144 03/06/2018 04:48 AM    Potassium 3.8 03/06/2018 04:48 AM    Chloride 112 (H) 03/06/2018 04:48 AM    CO2 22 03/06/2018 04:48 AM    Anion gap 10 03/06/2018 04:48 AM    Glucose 279 (H) 03/06/2018 04:48 AM    BUN 30 (H) 03/06/2018 04:48 AM    Creatinine 1.59 (H) 03/06/2018 04:48 AM    BUN/Creatinine ratio 19 03/06/2018 04:48 AM    GFR est AA 49 (L) 03/06/2018 04:48 AM    GFR est non-AA 41 (L) 03/06/2018 04:48 AM    Calcium 9.2 03/06/2018 04:48 AM     Lab Results   Component Value Date/Time    Protein, total 9.0 (H) 03/05/2018 01:11 PM    Albumin 3.8 03/05/2018 01:11 PM           Total time: Gasper Walton 105, NP

## 2018-03-09 NOTE — HOSPICE
[facility] MSW note: This MSW visited the room of pt, son Monica Garcia and his wife were at bedside, son Sarmad Wang was also present. Family has been holding bedside hobson. Both sons spent the night last night with pt. Pt appears comfortable. MSW and family discussed the dying process in the context of pts comfort. MSW provided grief and emotional support for family. Sons are sad. but accepting, mood is congruent for circumstances. Interventions include reminiscing; Monica Garcia and his wife talked about pts young grandchildren and guy the have brought to the family, psycho education re the dying process, and mysteries surrounding death. MSW provided empathetic listening as family talked about pts EOL and mother's recent death.       Isabelle HOOKS, [address] [place] [address] [place]  [phone]

## 2018-03-09 NOTE — PROGRESS NOTES
Physical Therapy Screening:  Services are not indicated at this time. An InBasket screening referral was triggered for physical therapy based on results obtained during the nursing admission assessment. The patients chart was reviewed and the patient is not appropriate for a skilled therapy evaluation at this time. Please consult physical therapy if any therapy needs arise. Thank you.     Lorenda Riedel, PT

## 2018-03-10 NOTE — HOSPICE
[facility]  [phone]    Routine Nursing Note   Patient Name: Nicanor Jane  YOB: 1923  Age: 80 y.o. Date of Visit: 03/10/18  Facility of Care: [facility]  Patient Room: 605/     Hospice Attending: Gale Fabian MD  Hospice Diagnosis: SDH  [facility]    Level of Care: Routine    ASSESSMENT, PLAN AND INTERVENTIONS     1. Patient admitted to Routine Level of Care  2. With assessed pain/dyspnea yesterday so Hydromorphone 1 mg IV q 4 hours with good palliation assessed today   3. With assessed restlessness/anxiety yesterday  Lorazepam 2 mg IV q 2 hours started with good palliation assessed today   4. No secretions audible with Scopolamine 1.5 mg patch in place    Spiritual Interventions  Psych/ Social/ Emotional Interventions: visited with daughter in law and son. Daughter in law expressed concern. Said she was told by unit nurse to have the family come if they wanted to see him as death seemed imminent. Isaak Francis the family was not happy with her since patient seemed the same without changes when they arrived. Support given and advised to let the other family members know that she was only following the recommendation of the patient's nurse. Appreciation for support expressed  Care Coordination Needs: discussed patient with unit nurse   Will send report to [facility]  hospice interdisciplinary team.    Care plan and New Orders discussed / approved with Hospice MD on call Dr. Juanjo Cordova. Description History and Chart Review     List number of doses of PRN medications in last 24 hours:  Medication 1:  Hydromorphone 0.5 mg IV   Number of doses:1      DISCHARGE PLANNING     1. Discharge Plan: It is expected that patient will pass while inpatient    2. Patient/Family teaching:  Education provided on symptoms at end of life    3.  Response to patient/family teaching: understanding and appreciation expressed        ASSESSMENT    KARNOFSKY: 10    Quality Measure: Patient self-reports:  [] Yes    [x] No    ESAS:   Time of Assessment: 1030  Pain  10):0  Fatigue (1-10):   Shortness of breath (1-10):0  Nausea (1-10): Appetite (1-10):    Anxiety: (1-10):   Depression: (1-10):   Well-being: (1-10):   Constipation: _ Yes  _ No  LAST BM:     CLINICAL INFORMATION   Patient Vitals for the past 12 hrs:   Temp Pulse Resp BP SpO2   03/10/18 1007 98.6 °F (37 °C) 80 16 105/58 91 %       Currently this patient has:  [] Supplemental O2   [] IV    [x] PICC      [] PORT   [] NG Tube    [] PEG Tube   [] Ostomy     [x] Schmidt draining  Dark dirk ____ urine  [] Other:     SIGNS/PHYSICAL FINDINGS     Skin (including wound):  [] Warm, dry, supple, intact and color normal for race  [x] Warm   [x] Dry   [] Cool     [] Clammy       [] Diaphoretic    Turgor   [] Normal   [x] Decreased  Color:   [] Pink   [x] Pale   [] Cyanotic   [] Erythema   [] Jaundice   [] Normal for Race  []  Wounds:    Neuro:  [] Lethargy  [] Restlessness / agitation  [] Confusion / delirium  [] Hallucinations  [] Responds to maximal stimulation  [x] Unresponsive  [] Seizures     Cardiac:  [] Dyspnea on Exertion  [] JVD  [] Murmur  [] Palpitations  [] Hypotension  [] Hypertension  [x] Tachycardia  [] Bradycardia  [x] Irregular HR  [] Pulses Decreased  [] Pulses Absent  [x] Edema:     Trace les  (Location, Grade and Pitting)  [] Mottling:      (Location)    Respiratory:  Breath sounds: clear and assessed to be easy     [] Diminished   [] Wheeze   [] Rhonchi   [] Rales   [] Even and unlabored  [] Labored:            [] Cough   [] Non Productive   [] Productive    [] Description:           [] Deep suctioned   [x] O2 at _2__ LPM  [] High flow oxygen greater than 10 LPM  [] Bi-Pap    GI  [x] Abdomen concave and soft with bowel sounds  [] Ascites  [] Nausea  [] Vomiting  [] Incontinent of bowels  [] Bowel sounds (yes/no)  [] Diarrhea  [] Constipation (see above including last bowel movement)  [] Checked for impaction  [] Last BM   nutrition  Diet:__________  Appetite:   [] Good   [] Fair   [] Poor   [] Tube Feeding       [] Voiding  [] Incontinent   [x] Schmidt very dark dirk urine in small amounts    Musculoskeletal  [] Balance/[place] Unsteady   [] Weak   Strength:    [] Normal    [] Limited    [] Decreasing   Activities:    [] Up as tolerated   [x] Bedridden    [] Specify:    SAFETy  PER HOSPITAL POLICIES    [] 24 hr. Caregiver   [x] Side rails ? [x] Hospital bed   [] Reviewed Falls & Safety     ALLERGIES AND MEDICATIONS     Allergies:    Allergies   Allergen Reactions    Erythromycin Rash    Penicillins Rash    Tetanus Vaccines And Toxoid Rash       Current Facility-Administered Medications   Medication Dose Route Frequency    LORazepam (ATIVAN) injection 2 mg  2 mg IntraVENous Q4H    HYDROmorphone (PF) (DILAUDID) injection 1 mg  1 mg IntraVENous Q4H    LORazepam (ATIVAN) injection 1 mg  1 mg IntraVENous Q15MIN PRN    acetaminophen (TYLENOL) suppository 650 mg  650 mg Rectal Q4H PRN    scopolamine (TRANSDERM-SCOP) 1 mg over 3 days 1 Patch  1 Patch TransDERmal Q72H    bisacodyl (DULCOLAX) suppository 10 mg  10 mg Rectal DAILY PRN    HYDROmorphone (PF) (DILAUDID) injection 0.5 mg  0.5 mg IntraVENous Q15MIN PRN    saline peripheral flush soln 5 mL  5 mL InterCATHeter PRN          Visit Time In: 9624  Visit Time Out: 1100

## 2018-03-10 NOTE — PROGRESS NOTES
Problem: Falls - Risk of  Goal: *Absence of Falls  Document Nancy Fall Risk and appropriate interventions in the flowsheet.    Outcome: Progressing Towards Goal  Fall Risk Interventions:  Mobility Interventions: Communicate number of staff needed for ambulation/transfer         Medication Interventions: Evaluate medications/consider consulting pharmacy    Elimination Interventions: Call light in reach, Toileting schedule/hourly rounds

## 2018-03-10 NOTE — PROGRESS NOTES
Bedside shift change report given to [facility] [address] (oncoming nurse) by Aria Hudson (offgoing nurse). Report included the following information SBAR, Kardex and MAR.

## 2018-03-10 NOTE — PROGRESS NOTES
Family member alerted nurse to the fact that patient's breathing pattern had changed. Nurse noted that patient's HR was 120 bpm & very irregular. RR 24/min w/ 25 second periods of apnea every 1-1 1/2 minutes. Explained to family member that death was probably imminent. She opted not to have patient given prn meds for tachypnea, until other family members arrived. Information conveyed to oncoming nurse.

## 2018-03-10 NOTE — PROGRESS NOTES
Bedside shift change report given to Marisa Hebert RN (oncoming nurse) by Kar Bryant (offgoing nurse). Report included the following information SBAR, Kardex, ED Summary, Intake/Output, MAR and Med Rec Status.

## 2018-03-10 NOTE — PROGRESS NOTES
03/09/18 2039   Vitals   Temp 97.6 °F (36.4 °C)   Temp Source Axillary   Pulse (Heart Rate) (!) 118   Heart Rate Source Monitor   Resp Rate 16   O2 Sat (%) (!) 88 %   Level of Consciousness (!) Unresponsive   /54   MAP (Calculated) 74   BP 1 Location Right arm   BP 1 Method Automatic   BP Patient Position At rest   MEWS Score 6   Pain 1   Pain Scale 1 Visual   Pain Intensity 1 0   POSS Scale   Opioid Sedation Scale 0   Oxygen Therapy   O2 Device Nasal cannula   O2 Flow Rate (L/min) 2 l/min   Patient Observation   Repositioned Head of bed elevated (degrees)   Observations vitals; shift assessment   Activity In bed;Family/Visitors present;Staff present   Mode of Transportation Stretcher;Oxygen     Pt is inpatient hospice.  Oxygen started

## 2018-03-11 NOTE — PROGRESS NOTES
TOD 1710. I was asked to come and examine pt. Pt does not respond to verbal or tactile stimuli. No heart or breath sounds appreciated w/ chest auscultation x1 min  No pulse palpable. Pupils are fixed and dilated. Pt has passed away. Son and dtr-in-law at bedside. It has been an honor and a privilege to have been involved in providing care for Mr. Yolanda Erazo and his family .

## 2018-03-11 NOTE — PROGRESS NOTES
[facility] in Need  [phone]    Patient Name: Alexi Hendricks  YOB: 1923    Date of Provider Hospice Visit: 03/11/18    Level of Care:   [] General Inpatient (GIP)    [x] Routine   [] Respite    Location of Care:  [x] [facility] [place] [] [facility] [] [facility] [] [address] [] Elsa Garcia [address] [place] [address]    Date of [facility] Admission: 03/08/18  Hospice Medical Director at time of admission: Dr. Ziggy Eddy Diagnosis: Acute Subdural hemorrhage  Diagnoses RELATED to the terminal prognosis: Atrial fibrillation, hx of CVA embolic, CAD, CHF, CKD, recurrent falls  Other Diagnoses: DJD, Mitral regurgitation, neuropathy     HOSPICE SUMMARY   Do not cut and paste chart information other than imaging findings    Alexi Hendricks is a 80y.o. year old getnleman who was admitted to [facility]. The patient's principle diagnosis of acute subdural hemorrhage has resulted in further rapid progression of disease and steady decline in function. Pt initially admitted to hospital s/p fall that led to SDH with significant right midline shift. Pt doing poorly, currently unresponsive with symptoms pf worsening pain and agitation, also had a seizure like activity. Family expressed wishes for no surgery or aggressive interventions. Refer to LCD     Functionally, the patient's Karnofsky and/or Palliative Performance Scale has declined over a period of days and is estimated at 10-20%. The patient is dependent on the following ADLs:all ADL's    Objective information that support this patients limited prognosis includes: CT 3/6/18  IMPRESSION:   Overall stable bilateral subdural hematomas, left larger than right. Stable  right subarachnoid hemorrhage. Increased rightward midline shift. Mildly  increased size of the lateral ventricles. No new acute intracranial hemorrhage. .      The patient/family chose comfort measures with the support of Hospice.      HOSPICE DIAGNOSES   Active Symptoms:  1. Tachypnea, increased WOB  2. Agitation, intermittent  3. Decreased responsiveness  4. Inability to eat/drink  5. Pain; non verbal     PLAN   1. Continue w/ routine level of care. 2. I was called by our hospice RN regarding concern for his breathing, RR>30. Pt has required higher total dosing of IV Dilaudid    3. Visited w/ pt in his rm. Son, dtr-in-law, and dtr-in-law's mother at bedside. 4. Pt is non responsive.  RR>30 and <36. Regular spacing b/t breaths  5. Pt has received a total of Dilaudid 9 mg IV in past 24h  6. Changed Dilaudid 1 mg IV q4h to Dilaudid 1.5 mg IV q4h  7. Changed Dilaudid 0.5 mg IV q15 min prn to Dilaudid 1 mg IV q15 min prn  8. Pt has received total of Ativan 18 mg IV in past 24h  9. Had changed Ativan 2 mg IV q4h to Ativan 3 mg IV q4h  10. Had changed Ativan 1 mg IV q15 min prn to Ativan 1.5 mg IV q15 min prn  11. Ordered Dilaudid 1.5 mg IV x 1 dose now. Pt received last dose of Dilaudid 1 mg IV @ ~1600 per bedside RN  12. Reassessed breathing 15 min later and then at nearly . RR still in high 20s. 13. Ordered Dilaudid 1.5 mg IV x 1 dose now. Given @ 1630. 14. Reassessed breathing. RR still in high 20s. 15. Asked bedside RN to give a dose of Dilaudid 1 mg IV q15 min prn. Given at [phone]  16. Will reassess in 10-15 min  17. Continue keppra  18. Continue Transderm scop patch  19. Continue Toradol 15 mg IV q6h prn fever  20.  and SW to support family needs  24. Disposition: home with hospice as symptoms being stabilized and pt survives beyond next few days. Prognosis estimated based on 03/11/18 clinical assessment is:   [x] Hours to Days    [] Days to Weeks    [] Other:    Communicated plan of care with: bedside RN Ryan Crowell); Care Team     GOALS OF CARE     Resuscitation Status: DNR  Pt's code status is DO NOT ATTEMPT RESUSCITATION (DNAR) / [address] (AND):     If pt has cardiopulmonary arrest, then   NO to chest compressions  NO to cardiac shock  NO to ACLS meds  NO to intubation    Durable DNR: [x] Yes [] No    Advance Care Planning 3/8/2018   Patient's Healthcare Decision Maker is: Legal Next of Ayo Brito   Primary Decision Maker Name Brendan Escoto   Primary Decision Maker Phone Number [phone]   Primary Decision Maker Relationship to Patient Adult child   Confirm Advance Directive Yes, on file   Does the patient have other document types Do Not Resuscitate        HISTORY     History obtained from: chart, family, hospice staff    CHIEF COMPLAINT: N/A  The patient is:   [] Verbal  [x] Nonverbal  [x] Unresponsive    HPI/SUBJECTIVE:    No o/n events or issues. Pt is not responsive. Family concerned about rapid breathing rate. Also see above.       REVIEW OF SYSTEMS     The following systems were: [] reviewed  [x] unable to be reviewed      Adult Non-Verbal Pain Assessment Score: 1    Face  [x] 0   No particular expression or smile  [] 1   Occasional grimace, tearing, frowning, wrinkled forehead  [] 2   Frequent grimace, tearing, frowning, wrinkled forehead    Activity (movement)  [x] 0   Lying quietly, normal position  [] 1   Seeking attention through movement or slow, cautious movement  [] 2   Restless, excessive activity and/or withdrawal reflexes    Guarding  [] 0   Lying quietly, no positioning of hands over areas of body  [x] 1   Splinting areas of the body, tense  [] 2   Rigid, stiff    Physiology (vital signs)  [x] 0   Stable vital signs  [] 1   Change in any of the following: SBP > 20mm Hg; HR > 20/minute  [] 2   Change in any of the following: SBP > 30mm Hg; HR > 25/minute    Respiratory  [x] 0   Baseline RR/SpO2, compliant with ventilator  [] 1   RR > 10 above baseline, or 5% drop SpO2, mild asynchrony with ventilator  [] 2   RR > 20 above baseline, or 10% drop SpO2, asynchrony with ventilator     FUNCTIONAL ASSESSMENT     Palliative Performance Scale (PPS):10-20%     PSYCHOSOCIAL/SPIRITUAL ASSESSMENT     Active Problems: Subdural hematoma (HCC) (3/5/2018)      Past Medical History:   Diagnosis Date    Acute confusion 9/29/2012    Atrial fibrillation (HCC)     CHRONIC    Borderline diabetes mellitus     CKD (chronic kidney disease)     Diet-controlled type 2 diabetes mellitus (HCC)     Dyslipidemia     Hypertension     Hypertensive cardiovascular disease     Meniere disease     Mitral regurgitation     echo 3-27-07    Neurological disorder     dizziness, tilt table test 5-4-07 unremarkable    NPH (normal pressure hydrocephalus)     Shunt Dr Marisol Alejandro MCV     Orthostatic hypotension     Other ill-defined conditions(799.89)     planter facia,2 achilles    Peripheral neuropathy     Stroke ([facility] [address]     4 TIA    TIA (transient ischemic attack)       Past Surgical History:   Procedure Laterality Date    HX APPENDECTOMY      HX CHOLECYSTECTOMY      HX HERNIA REPAIR      HX ORTHOPAEDIC      Left tkr,,rotator cuff    HX TURP        Social History   Substance Use Topics    Smoking status: Never Smoker    Smokeless tobacco: Never Used    Alcohol use Yes      Comment: weekly     Family History   Problem Relation Age of Onset    Heart Disease Father       Allergies   Allergen Reactions    Erythromycin Rash    Penicillins Rash    Tetanus Vaccines And Toxoid Rash      Current Facility-Administered Medications   Medication Dose Route Frequency    ketorolac (TORADOL) injection 15 mg  15 mg IntraVENous Q6H PRN    artificial saliva (MOUTH KOTE) 1 Spray  1 Spray Oral PRN    LORazepam (ATIVAN) injection 2 mg  2 mg IntraVENous Q4H    HYDROmorphone (PF) (DILAUDID) injection 1 mg  1 mg IntraVENous Q4H    LORazepam (ATIVAN) injection 1 mg  1 mg IntraVENous Q15MIN PRN    acetaminophen (TYLENOL) suppository 650 mg  650 mg Rectal Q4H PRN    scopolamine (TRANSDERM-SCOP) 1 mg over 3 days 1 Patch  1 Patch TransDERmal Q72H    bisacodyl (DULCOLAX) suppository 10 mg  10 mg Rectal DAILY PRN    HYDROmorphone (PF) (DILAUDID) injection 0.5 mg  0.5 mg IntraVENous Q15MIN PRN    saline peripheral flush soln 5 mL  5 mL InterCATHeter PRN        PHYSICAL EXAM     Wt Readings from Last 3 Encounters:   03/07/18 70.3 kg (155 lb)   03/03/18 75.3 kg (166 lb)   02/10/18 72 kg (158 lb 11.7 oz)       Visit Vitals    BP 92/66    Pulse (!) 106    Temp 99.9 °F (37.7 °C)    Resp 20    SpO2 92%       Supplemental O2  [x] Yes  [] NO  Last bowel movement:     Currently this patient has:  [x] Peripheral IV [] PICC  [] PORT [] ICD    [x] Schmidt Catheter [] NG Tube   [] PEG Tube    [] Rectal Tube [] Drain  [] Other:     Constitutional: lethargic, pale, thin, frail, unresponsive, responds only to painful stimuli and movements  Eyes: closed  ENMT: very dry oral mucosa, dried coating on tongue  Cardiovascular: distant heart sounds, tachycardia  Respiratory: Slightly labored breathing,  diminished air entry, RR>30  Gastrointestinal: sunken belly, soft, BS present, non tender  Musculoskeletal:thin, contractures, deformities, tender, non tender  Skin: warm, dry, no rash, stasis circulation changes  Neurologic: unresponsive  Psychiatric: limited d/t condition  No obvious signs of agitation    Pertinent Lab and or Imaging Tests:  Lab Results   Component Value Date/Time    Sodium 144 03/06/2018 04:48 AM    Potassium 3.8 03/06/2018 04:48 AM    Chloride 112 (H) 03/06/2018 04:48 AM    CO2 22 03/06/2018 04:48 AM    Anion gap 10 03/06/2018 04:48 AM    Glucose 279 (H) 03/06/2018 04:48 AM    BUN 30 (H) 03/06/2018 04:48 AM    Creatinine 1.59 (H) 03/06/2018 04:48 AM    BUN/Creatinine ratio 19 03/06/2018 04:48 AM    GFR est AA 49 (L) 03/06/2018 04:48 AM    GFR est non-AA 41 (L) 03/06/2018 04:48 AM    Calcium 9.2 03/06/2018 04:48 AM     Lab Results   Component Value Date/Time    Protein, total 9.0 (H) 03/05/2018 01:11 PM    Albumin 3.8 03/05/2018 01:11 PM         Total time: 80 min  Counseling / coordination time: 55 min  > 50% counseling / coordination?: yes

## 2018-03-11 NOTE — PROGRESS NOTES
Bedside shift change report given to Xiomara Souza RN (oncoming nurse) by Yany Baptiste RN (offgoing nurse). Report included the following information SBAR, Kardex and MAR.

## 2018-03-11 NOTE — PROGRESS NOTES
Bedside shift change report given to [address] [place] [address] Jane (oncoming nurse) by Trish Hunter (offgoing nurse). Report included the following information SBAR and MAR. The beginning of Daylight Saving Time occurred at 0200 hrs. Documentation of patient care and medications administered is done with respect to the time change.

## 2018-03-11 NOTE — HOSPICE
[facility] RN note. Received TC from unit reporting patient with increased respiratory rate and distress as well as fever. Hospice MD on call this juan a Dr. Angelito Conley advised. Order received for Toradol 15 mg IV q 6 hours as needed fever. Advised unit nurse that Dr. Thai Poon is on his way to [facility] and will see Mr Velazquez. Understanding expressed.     Please call [phone] if questions or concerns    Konstantin Gupta, JOSE RAMON [facility]

## 2018-03-11 NOTE — HOSPICE
[facility]  [phone]    Discharge/Death Nursing Note   Patient Name: Roni Oconnor  YOB: 1923  Age: 80 y.o. Date of Death: 18  Admitted Date: 3/7/2018  Time of Death:  [address] [place]: [facility]  Level of Care: Routine  Patient Room: [address] [place] [address] Attending: Lopez Nova MD  Hospice Diagnosis: SDH  Subdural hematoma ([facility] [address]    Death Pronouncement   Pronouncement of death completed by: Dr. Ellie Garcia staff was  present at the time of death    At the time of death the patient was documented as  TOD 1710. I was asked to come and examine pt. Pt does not respond to verbal or tactile stimuli. No heart or breath sounds appreciated w/ chest auscultation x1 min  No pulse palpable. Pupils are fixed and dilated.       Pt has passed away. Son and dtr-in-law at bedside.    It has been an honor and a privilege to have been involved in providing care for Mr. Roni Oconnor and his family .     The pt  within [facility]    The following were notified of the patient's death: family present  [facility] hospice interdisciplinary team.      Medications were disposed of per facility protocol     Discharge Summary   Discharge Reason: Death    Summary of Care Provided:    [x] Post mortem care provided by staff  [x] Notification of  home by staff  [x] Goals completed  Disciplines involved: [x] RN [x] SW [x]   [x] [facility]    [x] IDT communication/notification    Attending Physician, Dr. Lopez Nova  notified of death    Bereaved   Verify bereaved identified with name, address, telephone number and risk level      Advance Care Planning 3/8/2018   Patient's Healthcare Decision Maker is: Legal Next of Ayo 69   Primary Decision Maker Name Dina Odonnell   Primary Decision Maker Phone Number [phone]   Primary Decision Maker Relationship to Patient Adult child   Confirm Advance Directive Yes, on file   Does the patient have other document types Do Not Resuscitate

## 2018-03-11 NOTE — PROGRESS NOTES
Bedside shift change report given to Sahara Weeks RN (oncoming nurse) by Vandana Shi RN (offgoing nurse). Report included the following information SBAR, Percy and MAR.   [phone]- Paged on call hospice MD/RN to notify that PRN Ativan and Dilaudid are not making patient comfortable- RR >30 at times.

## 2018-03-11 NOTE — PROGRESS NOTES
Responded to page from Nancy Porter 9 regarding the death of Mr. Mary Jane Ricketts. Familiar with this patient from a previous visit. No family present upon arrival.  Offered silent prayer. [facility] Staff  (Jay Anderson Patient Care Specialist)   Paging Service [phone])

## 2018-03-11 NOTE — PROGRESS NOTES
Problem: Falls - Risk of  Goal: *Absence of Falls  Document Nancy Fall Risk and appropriate interventions in the flowsheet.    Outcome: Progressing Towards Goal  Fall Risk Interventions:  Mobility Interventions: Communicate number of staff needed for ambulation/transfer         Medication Interventions: Patient to call before getting OOB, Bed/chair exit alarm    Elimination Interventions: Bed/chair exit alarm, Call light in reach, Patient to call for help with toileting needs    History of Falls Interventions: Bed/chair exit alarm, Door open when patient unattended

## 2018-03-13 LAB
BACTERIA SPEC CULT: NORMAL
SERVICE CMNT-IMP: NORMAL

## 2018-03-14 NOTE — DISCHARGE SUMMARY
Discharge Summary    [facility]  [facility] to Those in Need  [phone]      Date of Admission: 3/7/2018  Date of Discharge: 3/11/2018    Ana Martinez is a 80y.o. year old who was admitted to [facility] at [facility] with a Hospice diagnosis of SDH;Subdural hematoma ([address] Pt was admitted for routine hospice care: Per [facility]    HOSPICE SUMMARY   Do not cut and paste chart information other than imaging findings     Ana Martinez is a 80y.o. year old who was admitted to [facility].      The patient's principle diagnosis of acute subdural hemorrhage has resulted in further rapid progression of disease and steady decline in function. Pt initially admitted to hospital s/p fall that led to SDH with significant right midline shift. Pt doing poorly, currently unresponsive with symptoms pf worsening pain and agitation, also had a seizure like activity. Family expressed wishes for no surgery or aggressive interventions.         Functionally, the patient's Karnofsky and/or Palliative Performance Scale has declined over a period of days and is estimated at 10-20%. The patient is dependent on the following ADLs:all ADL's     Objective information that support this patients limited prognosis includes: CT 3/6/18  IMPRESSION:   Overall stable bilateral subdural hematomas, left larger than right. Stable  right subarachnoid hemorrhage. Increased rightward midline shift. Mildly  increased size of the lateral ventricles. No new acute intracranial hemorrhage. .       The patient/family chose comfort measures with the support of Hospice.      HOSPICE DIAGNOSES   Active Symptoms:  1. Restlessness/agitation; intermittent  2. Decreased responsiveness  3. Inability to eat/drink  4. Pain; non verbal      PLAN   1. Pt initially admitted for [facility] level care due to symptoms of pain and agitation and decreased responsiveness.  Pt had received several prn doses of dilaudid and ativan for managing pain and agitation and required titration of medications, close monitoring for seizures while on IV keppra. Now pt has stabilized somewhat and not needed prn medications much so will switch to routine level hospice care  2. Continue current regimen of medications     3.  and SW to support family needs  4. Disposition: home with hospice as symptoms being stabilized and pt survives beyond next few days. The patient's care was focused on comfort and the patient passed away on 3/11/2018.